# Patient Record
Sex: MALE | Race: WHITE | URBAN - METROPOLITAN AREA
[De-identification: names, ages, dates, MRNs, and addresses within clinical notes are randomized per-mention and may not be internally consistent; named-entity substitution may affect disease eponyms.]

---

## 2021-08-12 ENCOUNTER — OFFICE VISIT (OUTPATIENT)
Dept: GERIATRIC MEDICINE | Age: 86
End: 2021-08-12
Payer: MEDICARE

## 2021-08-12 DIAGNOSIS — G20 PARKINSON DISEASE (HCC): Primary | ICD-10-CM

## 2021-08-12 DIAGNOSIS — R53.1 WEAKNESS: ICD-10-CM

## 2021-08-12 DIAGNOSIS — E11.9 DIABETES MELLITUS WITHOUT COMPLICATION (HCC): ICD-10-CM

## 2021-08-12 DIAGNOSIS — E03.9 HYPOTHYROIDISM, UNSPECIFIED TYPE: ICD-10-CM

## 2021-08-12 DIAGNOSIS — I10 ESSENTIAL HYPERTENSION: ICD-10-CM

## 2021-08-12 RX ORDER — MEMANTINE HYDROCHLORIDE 5 MG/1
5 TABLET ORAL 2 TIMES DAILY
COMMUNITY
End: 2021-08-20 | Stop reason: SDUPTHER

## 2021-08-12 RX ORDER — PRAVASTATIN SODIUM 10 MG
10 TABLET ORAL DAILY
COMMUNITY
End: 2021-08-20 | Stop reason: SDUPTHER

## 2021-08-12 RX ORDER — LEVOTHYROXINE SODIUM 0.07 MG/1
75 TABLET ORAL DAILY
COMMUNITY
End: 2021-08-20 | Stop reason: SDUPTHER

## 2021-08-12 RX ORDER — MULTIVITAMIN
1 TABLET ORAL DAILY
COMMUNITY
End: 2021-08-20 | Stop reason: SDUPTHER

## 2021-08-12 RX ORDER — INSULIN GLARGINE 100 [IU]/ML
15 INJECTION, SOLUTION SUBCUTANEOUS NIGHTLY
COMMUNITY
End: 2021-08-20 | Stop reason: SDUPTHER

## 2021-08-12 RX ORDER — DOCUSATE SODIUM 100 MG/1
100 CAPSULE, LIQUID FILLED ORAL 2 TIMES DAILY
COMMUNITY
End: 2021-09-16 | Stop reason: SDUPTHER

## 2021-08-12 RX ORDER — PRAMIPEXOLE DIHYDROCHLORIDE 0.12 MG/1
0.12 TABLET ORAL DAILY
COMMUNITY
End: 2021-08-20 | Stop reason: SDUPTHER

## 2021-08-12 RX ORDER — POLYETHYLENE GLYCOL 3350 17 G/17G
17 POWDER, FOR SOLUTION ORAL DAILY PRN
COMMUNITY
End: 2021-08-20 | Stop reason: SDUPTHER

## 2021-08-12 RX ORDER — CITALOPRAM 10 MG/1
5 TABLET ORAL DAILY
COMMUNITY
End: 2021-08-20 | Stop reason: SDUPTHER

## 2021-08-20 DIAGNOSIS — K59.00 CONSTIPATION, UNSPECIFIED CONSTIPATION TYPE: ICD-10-CM

## 2021-08-20 DIAGNOSIS — E03.9 HYPOTHYROIDISM, UNSPECIFIED TYPE: ICD-10-CM

## 2021-08-20 DIAGNOSIS — Z79.4 TYPE 2 DIABETES MELLITUS WITHOUT COMPLICATION, WITH LONG-TERM CURRENT USE OF INSULIN (HCC): ICD-10-CM

## 2021-08-20 DIAGNOSIS — F03.90 DEMENTIA WITHOUT BEHAVIORAL DISTURBANCE, UNSPECIFIED DEMENTIA TYPE: ICD-10-CM

## 2021-08-20 DIAGNOSIS — F32.A DEPRESSION, UNSPECIFIED DEPRESSION TYPE: Primary | ICD-10-CM

## 2021-08-20 DIAGNOSIS — G20 PARKINSON'S DISEASE (HCC): ICD-10-CM

## 2021-08-20 DIAGNOSIS — E78.5 HYPERLIPIDEMIA, UNSPECIFIED HYPERLIPIDEMIA TYPE: ICD-10-CM

## 2021-08-20 DIAGNOSIS — Z00.00 HEALTHCARE MAINTENANCE: ICD-10-CM

## 2021-08-20 DIAGNOSIS — E11.9 TYPE 2 DIABETES MELLITUS WITHOUT COMPLICATION, WITH LONG-TERM CURRENT USE OF INSULIN (HCC): ICD-10-CM

## 2021-08-20 RX ORDER — CITALOPRAM 10 MG/1
5 TABLET ORAL DAILY
Qty: 30 TABLET | Refills: 3 | Status: SHIPPED | OUTPATIENT
Start: 2021-08-20 | End: 2021-09-13

## 2021-08-20 RX ORDER — LEVOTHYROXINE SODIUM 0.07 MG/1
75 TABLET ORAL DAILY
Qty: 30 TABLET | Refills: 3 | Status: SHIPPED | OUTPATIENT
Start: 2021-08-20 | End: 2021-12-15

## 2021-08-20 RX ORDER — BLOOD-GLUCOSE METER
1 KIT MISCELLANEOUS DAILY
Qty: 1 KIT | Refills: 0 | Status: SHIPPED | OUTPATIENT
Start: 2021-08-20

## 2021-08-20 RX ORDER — PRAVASTATIN SODIUM 10 MG
10 TABLET ORAL DAILY
Qty: 30 TABLET | Refills: 3 | Status: SHIPPED | OUTPATIENT
Start: 2021-08-20 | End: 2021-09-14

## 2021-08-20 RX ORDER — LANCETS 30 GAUGE
1 EACH MISCELLANEOUS 4 TIMES DAILY
Qty: 200 EACH | Refills: 3 | Status: SHIPPED | OUTPATIENT
Start: 2021-08-20

## 2021-08-20 RX ORDER — MULTIVITAMIN
1 TABLET ORAL DAILY
Qty: 30 TABLET | Refills: 3 | Status: SHIPPED | OUTPATIENT
Start: 2021-08-20 | End: 2021-12-10

## 2021-08-20 RX ORDER — PRAMIPEXOLE DIHYDROCHLORIDE 0.12 MG/1
0.12 TABLET ORAL DAILY
Qty: 30 TABLET | Refills: 3 | Status: SHIPPED | OUTPATIENT
Start: 2021-08-20 | End: 2021-09-15

## 2021-08-20 RX ORDER — CHLORAL HYDRATE 500 MG
1 CAPSULE ORAL DAILY
Qty: 30 CAPSULE | Refills: 3 | Status: SHIPPED | OUTPATIENT
Start: 2021-08-20 | End: 2021-09-14

## 2021-08-20 RX ORDER — DOCUSATE SODIUM 100 MG/1
100 CAPSULE, LIQUID FILLED ORAL 2 TIMES DAILY PRN
Qty: 60 CAPSULE | Refills: 0 | Status: SHIPPED | OUTPATIENT
Start: 2021-08-20 | End: 2021-09-15

## 2021-08-20 RX ORDER — INSULIN LISPRO 100 [IU]/ML
INJECTION, SOLUTION INTRAVENOUS; SUBCUTANEOUS
Qty: 5 CARTRIDGE | Refills: 3 | Status: SHIPPED | OUTPATIENT
Start: 2021-08-20 | End: 2022-05-17 | Stop reason: SDUPTHER

## 2021-08-20 RX ORDER — ACETAMINOPHEN 325 MG/1
650 TABLET ORAL EVERY 4 HOURS PRN
Qty: 120 TABLET | Refills: 3 | Status: SHIPPED | OUTPATIENT
Start: 2021-08-20

## 2021-08-20 RX ORDER — INSULIN GLARGINE 100 [IU]/ML
15 INJECTION, SOLUTION SUBCUTANEOUS NIGHTLY
Qty: 450 UNITS | Refills: 3 | Status: SHIPPED | OUTPATIENT
Start: 2021-08-20 | End: 2021-09-19

## 2021-08-20 RX ORDER — MEMANTINE HYDROCHLORIDE 5 MG/1
5 TABLET ORAL 2 TIMES DAILY
Qty: 60 TABLET | Refills: 3 | Status: SHIPPED | OUTPATIENT
Start: 2021-08-20 | End: 2021-09-14

## 2021-08-20 RX ORDER — LANCETS 30 GAUGE
EACH MISCELLANEOUS
Qty: 200 EACH | Refills: 3 | Status: SHIPPED | OUTPATIENT
Start: 2021-08-20

## 2021-08-20 RX ORDER — POLYETHYLENE GLYCOL 3350 17 G/17G
17 POWDER, FOR SOLUTION ORAL EVERY 8 HOURS PRN
Qty: 527 G | Refills: 3 | Status: SHIPPED | OUTPATIENT
Start: 2021-08-20 | End: 2021-09-19

## 2021-09-12 NOTE — PROGRESS NOTES
PATIENT: Gloria Gamble : 1935 DOS: [id number]  [facility] serving [address] [facility] and [facility] with his wife present. Patient with a known history of Parkinson's, diabetes, dysphagia,  ?weakness and unsteadiness with near falls, degenerative joint disease, hyperglycemia,  hypothyroidism, heart failure chronic systolic, hypothyroidism, hyperlipidemia, status post  pacemaker placement. Family history included Parkinson's, diabetes, coronary artery disease. This was an 81-year-old gentleman seen in his room, pleasant, interactive. Patient has had falls  in the past. Has had prior CABG. Patient had been transitioned to assisted living and  subsequently to this facility, the patient has had prior fall. He was found to have a rib fracture,  had evidence of rhabdomyolysis. Patient had been hospitalized, subsequently stabilized, and  transferred here for possible long-term placement. Patient seen with his wife present who is his  primary caregiver. Patient does have a known history of heart failure with ejection fraction last  checked at 39%. Patient is clinically stable, doing well at this time. Patient's blood sugars have  been intermittently elevated and largely diet controlled. Patient and his wife have a good  understanding on how to manage his blood sugars with coverage insulin. His medications on arrival included Synthroid 75 mcg daily, vitamin D3, pravastatin 10 mg  daily, Namenda 5 mg twice daily, Colace 100 mg daily, fish oil capsule, Celexa 5 mg daily,  Lantus 15 units subcu daily, Humalog insulin with meals. REVIEW OF SYSTEMS: Patient denied chest pain, palpitations, nausea, vomiting, emesis,  fevers, chills. No changes in his bowel or bladder habits. OBJECTIVE: Vital signs are stable. Respirations 14, pulse 68, blood pressure 150/78. He is  alert and oriented x2 to 3. A little unclear on date. Normocephalic. He does have mask-like  facies.  Pupils are small, but they are Transportation (Medical):  Lack of Transportation (Non-Medical):    Physical Activity:     Days of Exercise per Week:     Minutes of Exercise per Session:    Stress:     Feeling of Stress :    Social Connections:     Frequency of Communication with Friends and Family:     Frequency of Social Gatherings with Friends and Family:     Attends [employer] Services:     Active Member of Clubs or Organizations:     Attends Club or Organization Meetings:     Marital Status:    Intimate Partner Violence:     Fear of Current or Ex-Partner:     Emotionally Abused:     Physically Abused:     Sexually Abused:          No results found for: LABA1C  No results found for: EAG    No results found for: NA, K, CL, CO2, BUN, CREATININE, GLUCOSE, CALCIUM     No results found for: CHOL  No results found for: TRIG  No results found for: HDL  No results found for: LDLCHOLESTEROL, LDLCALC  No results found for: LABVLDL, VLDL  No results found for: CHOLHDLRATIO    No results found for: TSHFT4, TSH    No results found for: WBC, HGB, HCT, MCV, PLT    Please note orders entered on site at facility after discussion with appropriate facility nursing/therapy/ / nutritional staff. Current longstanding medical problems and acute medical issues addressed with staff. Available data and data elements in on site paper chart reviewed and analyzed. Current external consultant notes reviewed in on site chart. Ordered laboratory testing and imaging will be reviewed when available.

## 2021-09-14 DIAGNOSIS — G20 PARKINSON'S DISEASE (HCC): ICD-10-CM

## 2021-09-16 RX ORDER — PRAMIPEXOLE DIHYDROCHLORIDE 0.12 MG/1
TABLET ORAL
Qty: 90 TABLET | Refills: 0 | Status: SHIPPED | OUTPATIENT
Start: 2021-09-16 | End: 2021-12-15

## 2021-09-16 RX ORDER — DOCUSATE SODIUM 100 MG/1
CAPSULE, LIQUID FILLED ORAL
Qty: 180 CAPSULE | Refills: 0 | Status: SHIPPED | OUTPATIENT
Start: 2021-09-16

## 2021-09-20 DIAGNOSIS — Z79.4 TYPE 2 DIABETES MELLITUS WITHOUT COMPLICATION, WITH LONG-TERM CURRENT USE OF INSULIN (HCC): Primary | ICD-10-CM

## 2021-09-20 DIAGNOSIS — E11.9 TYPE 2 DIABETES MELLITUS WITHOUT COMPLICATION, WITH LONG-TERM CURRENT USE OF INSULIN (HCC): Primary | ICD-10-CM

## 2021-09-20 DIAGNOSIS — E11.65 UNCONTROLLED TYPE 2 DIABETES MELLITUS WITH HYPERGLYCEMIA (HCC): Primary | ICD-10-CM

## 2021-09-20 RX ORDER — BLOOD-GLUCOSE METER
1 KIT MISCELLANEOUS 4 TIMES DAILY
Qty: 150 EACH | Refills: 3 | Status: CANCELLED | OUTPATIENT
Start: 2021-09-20

## 2021-09-21 RX ORDER — BLOOD-GLUCOSE METER
1 KIT MISCELLANEOUS DAILY
Qty: 100 EACH | Refills: 3 | Status: SHIPPED | OUTPATIENT
Start: 2021-09-21 | End: 2022-06-27

## 2021-09-28 PROBLEM — R53.1 WEAKNESS: Status: ACTIVE | Noted: 2021-09-28

## 2021-09-28 PROBLEM — G20.A1 PARKINSON DISEASE: Status: ACTIVE | Noted: 2021-09-28

## 2021-09-28 PROBLEM — E11.9 DIABETES MELLITUS WITHOUT COMPLICATION (HCC): Status: ACTIVE | Noted: 2021-09-28

## 2021-09-28 PROBLEM — I10 ESSENTIAL HYPERTENSION: Status: ACTIVE | Noted: 2021-09-28

## 2021-09-28 PROBLEM — G20 PARKINSON DISEASE (HCC): Status: ACTIVE | Noted: 2021-09-28

## 2021-09-28 PROBLEM — E03.9 HYPOTHYROIDISM: Status: ACTIVE | Noted: 2021-09-28

## 2021-10-06 ENCOUNTER — OFFICE VISIT (OUTPATIENT)
Dept: GERIATRIC MEDICINE | Age: 86
End: 2021-10-06
Payer: MEDICARE

## 2021-10-06 DIAGNOSIS — E11.9 DIABETES MELLITUS WITHOUT COMPLICATION (HCC): Primary | ICD-10-CM

## 2021-10-06 DIAGNOSIS — E03.9 HYPOTHYROIDISM, UNSPECIFIED TYPE: ICD-10-CM

## 2021-12-05 NOTE — PROGRESS NOTES
[facility]  Chief Complaint   Patient presents with    Hypothyroidism    Diabetes       Patient being seen today for acute visit for diabetes mellitus and hypothyroidism. Resident recently requested to switch over to our service so he does not need to find transportation to an outside provider. Resident wants to ensure that he is taking the correct dosage of his insulin. He is managing himself at home with some supervision from his wife. He is home bound in an independent living facility. Otherwise, no concerns. SUBJECTIVE:  Resident states she is doing well. He just wants to ensure he is taking the correct dose of insulin. Otherwise, no concerns. FAMILY/SOCIAL HISTORY:  Refer to H and P. No past medical history on file. MEDICATIONS AND ALLERGIES:  Reviewed in Epic and in chart at facility. ALLERGIES:aspirin, levofloxacin, sulfa antibiotics. MEDICATIONS: Mirapex 0.125 mg 1 tab p.o. q.d.; docusate sodium 100-mg capsule p.o. b.i.d., fish oil 1000-mg capsules p.o. q.d.; Celexa 10-mg tab take 0.5 tab p.o. q.d.; memantine 5-mg tab 1 tab p.o. b.i.d.; vitamin D 25 mcg p.o. q.d.; Pravachol 10-mg tab p.o. q.d.; acetaminophen 325-mg tab 2 tabs p.o. q.4 p.r.n. pain; Lantus 18 units at h.s.; Humalog 6 units with breakfast and lunch, 14 units with dinner; multivitamin 1 tab p.o. q.d.; Synthroid 75-mcg tab 1 tab p.o. q.d. REVIEW OF SYSTEMS:  Resident denies any headache, dizziness, blurred vision. He denies any fever, chills, sore throat. He denies any rashes, bruises, open areas. He denies any chest pain, chest discomfort, or palpitations. He denies any cough, shortness of breath. He denies any heartburn, nausea, vomiting, abdominal pain. He denies any urinary urgency, frequency, or dysuria. He denies any constipation or diarrhea. He states he is eating well and sleeping well, and has no pain. PE:  Resident's heart rate 76, respirations 19.   Constitutional: Resident is alert, elderly male, appears somewhat confused at his baseline, in no apparent distress. Integ:  Pink, warm, dry. No visible rashes, bruises or open areas. HEENT:  Normocephalic, atraumatic in appearance. External ears within normal limits. He is hard of hearing. Conjunctivae pink. Sclerae nonicteric. Nasal mucosa pink. No nasal drainage noted. Mucous membranes pink, moist.  No oropharyngeal exudate. Neck:  Supple. No cervical or clavicular lymphadenopathy. No masses noted. Cardiovascular:  Heart rate regular rate and rhythm. No murmurs, gallops, or rubs noted. Respiratory:  Lung sounds clear through all fields. Respirations even and nonlabored. No use of accessory muscles with breathing. Abdomen:  Soft, nontender, nondistended. Normoactive bowel sounds. No masses noted. Musculoskeletal:  No muscle tenderness. No joint discomfort. PV:  Peripheral pulses present. No edema noted. Neurological:  Resident is alert, somewhat confused at his baseline. Ambulates with stable gait. Psych:  Mood stable. Affect pleasant. Speech normal rate and tone. A AND P:  1. Diabetes mellitus per Epic. Resident is on 18 units of Lantus at h.s.; Humalog 6 units breakfast and lunch, 14 units with dinner. I have advised him to continue to track his readings and if they appear to be trending upward and higher that they should contact nursing staff and I will come back. It appears currently that he is taking the correct dosing. 2.   Hypothyroidism. We will do a TSH and we will continue his Synthroid as ordered. I have reviewed this resident's medication and treatment plan, as well as most recent lab work. No further changes will be made at this time. Return if symptoms worsen or fail to improve. Please note this report is partially produced by using speech recognition hardware.   It may contain errors related to the system, including grammar, punctuation and spelling as well as words and phrases that may seem inaccurate. For any questions or concerns feel free to contact me for clarification           Electronically Signed By: Luc Pruitt. Bert Gentile CNP on 12/05/2021 12:52:25  ______________________________  Luc Pruitt.  Bert Gentile CNP  QP/[id number]  D: 12/02/2021 12:06:13  T: 12/02/2021 12:36:08    cc: - [facility]

## 2021-12-09 DIAGNOSIS — Z00.00 HEALTHCARE MAINTENANCE: ICD-10-CM

## 2021-12-10 RX ORDER — MULTIVITAMIN WITH FOLIC ACID 400 MCG
TABLET ORAL
Qty: 30 TABLET | Refills: 3 | Status: SHIPPED | OUTPATIENT
Start: 2021-12-10 | End: 2022-05-17

## 2021-12-14 DIAGNOSIS — G20 PARKINSON'S DISEASE (HCC): ICD-10-CM

## 2021-12-14 DIAGNOSIS — E03.9 HYPOTHYROIDISM, UNSPECIFIED TYPE: ICD-10-CM

## 2021-12-15 RX ORDER — PRAMIPEXOLE DIHYDROCHLORIDE 0.12 MG/1
TABLET ORAL
Qty: 90 TABLET | Refills: 0 | Status: SHIPPED | OUTPATIENT
Start: 2021-12-15 | End: 2022-05-17

## 2021-12-15 RX ORDER — LEVOTHYROXINE SODIUM 0.07 MG/1
TABLET ORAL
Qty: 90 TABLET | Refills: 1 | Status: SHIPPED | OUTPATIENT
Start: 2021-12-15 | End: 2022-05-17

## 2021-12-17 LAB — TSH SERPL DL<=0.05 MIU/L-ACNC: 0.38 UIU/ML (ref 0.44–3.86)

## 2022-01-14 LAB — TSH SERPL DL<=0.05 MIU/L-ACNC: 0.51 UIU/ML (ref 0.44–3.86)

## 2022-02-02 DIAGNOSIS — F32.9 MAJOR DEPRESSIVE DISORDER, REMISSION STATUS UNSPECIFIED, UNSPECIFIED WHETHER RECURRENT: Primary | ICD-10-CM

## 2022-02-02 DIAGNOSIS — F32.A DEPRESSION, UNSPECIFIED DEPRESSION TYPE: ICD-10-CM

## 2022-02-02 LAB
BACTERIA: NEGATIVE /HPF
BILIRUBIN URINE: NEGATIVE
BLOOD, URINE: ABNORMAL
CLARITY: CLEAR
COLOR: ABNORMAL
EPITHELIAL CELLS, UA: ABNORMAL /HPF (ref 0–5)
GLUCOSE URINE: 500 MG/DL
HYALINE CASTS: ABNORMAL /HPF (ref 0–5)
KETONES, URINE: NEGATIVE MG/DL
LEUKOCYTE ESTERASE, URINE: NEGATIVE
NITRITE, URINE: NEGATIVE
PH UA: 6.5 (ref 5–9)
PROTEIN UA: ABNORMAL MG/DL
RBC UA: >100 /HPF (ref 0–5)
SPECIFIC GRAVITY UA: 1.02 (ref 1–1.03)
UROBILINOGEN, URINE: 0.2 E.U./DL
WBC UA: ABNORMAL /HPF (ref 0–5)

## 2022-02-02 RX ORDER — CITALOPRAM 10 MG/1
10 TABLET ORAL DAILY
Qty: 30 TABLET | Refills: 3 | Status: SHIPPED | OUTPATIENT
Start: 2022-02-02 | End: 2022-03-02

## 2022-02-04 LAB — URINE CULTURE, ROUTINE: NORMAL

## 2022-02-10 DIAGNOSIS — Z00.00 HEALTHCARE MAINTENANCE: ICD-10-CM

## 2022-02-10 RX ORDER — CHLORAL HYDRATE 500 MG
CAPSULE ORAL
Qty: 90 CAPSULE | Refills: 1 | Status: SHIPPED | OUTPATIENT
Start: 2022-02-10 | End: 2022-10-06

## 2022-02-11 LAB
ALBUMIN SERPL-MCNC: 3.8 G/DL (ref 3.5–4.6)
ALP BLD-CCNC: 88 U/L (ref 35–104)
ALT SERPL-CCNC: 29 U/L (ref 0–41)
AMMONIA: 18 UMOL/L (ref 16–60)
ANION GAP SERPL CALCULATED.3IONS-SCNC: 10 MEQ/L (ref 9–15)
AST SERPL-CCNC: 40 U/L (ref 0–40)
BASOPHILS ABSOLUTE: 0 K/UL (ref 0–0.2)
BASOPHILS RELATIVE PERCENT: 0.5 %
BILIRUB SERPL-MCNC: 0.6 MG/DL (ref 0.2–0.7)
BUN BLDV-MCNC: 30 MG/DL (ref 8–23)
CALCIUM SERPL-MCNC: 9.3 MG/DL (ref 8.5–9.9)
CHLORIDE BLD-SCNC: 106 MEQ/L (ref 95–107)
CO2: 24 MEQ/L (ref 20–31)
CREAT SERPL-MCNC: 1.34 MG/DL (ref 0.7–1.2)
EOSINOPHILS ABSOLUTE: 0.1 K/UL (ref 0–0.7)
EOSINOPHILS RELATIVE PERCENT: 2.3 %
GFR AFRICAN AMERICAN: >60
GFR NON-AFRICAN AMERICAN: 50.6
GLOBULIN: 3.3 G/DL (ref 2.3–3.5)
GLUCOSE BLD-MCNC: 96 MG/DL (ref 70–99)
HCT VFR BLD CALC: 40.7 % (ref 42–52)
HEMOGLOBIN: 13.3 G/DL (ref 14–18)
LYMPHOCYTES ABSOLUTE: 1.2 K/UL (ref 1–4.8)
LYMPHOCYTES RELATIVE PERCENT: 19.4 %
MCH RBC QN AUTO: 29.8 PG (ref 27–31.3)
MCHC RBC AUTO-ENTMCNC: 32.8 % (ref 33–37)
MCV RBC AUTO: 91 FL (ref 80–100)
MONOCYTES ABSOLUTE: 0.5 K/UL (ref 0.2–0.8)
MONOCYTES RELATIVE PERCENT: 7.9 %
NEUTROPHILS ABSOLUTE: 4.3 K/UL (ref 1.4–6.5)
NEUTROPHILS RELATIVE PERCENT: 69.9 %
PDW BLD-RTO: 14.4 % (ref 11.5–14.5)
PLATELET # BLD: 136 K/UL (ref 130–400)
POTASSIUM SERPL-SCNC: 5.1 MEQ/L (ref 3.4–4.9)
RBC # BLD: 4.47 M/UL (ref 4.7–6.1)
SODIUM BLD-SCNC: 140 MEQ/L (ref 135–144)
TOTAL PROTEIN: 7.1 G/DL (ref 6.3–8)
TSH SERPL DL<=0.05 MIU/L-ACNC: 0.51 UIU/ML (ref 0.44–3.86)
WBC # BLD: 6.2 K/UL (ref 4.8–10.8)

## 2022-02-12 DIAGNOSIS — F03.90 DEMENTIA WITHOUT BEHAVIORAL DISTURBANCE, UNSPECIFIED DEMENTIA TYPE: ICD-10-CM

## 2022-02-12 DIAGNOSIS — Z00.00 HEALTHCARE MAINTENANCE: ICD-10-CM

## 2022-02-12 DIAGNOSIS — E78.5 HYPERLIPIDEMIA, UNSPECIFIED HYPERLIPIDEMIA TYPE: ICD-10-CM

## 2022-02-14 RX ORDER — MEMANTINE HYDROCHLORIDE 5 MG/1
TABLET ORAL
Qty: 180 TABLET | Refills: 1 | Status: SHIPPED | OUTPATIENT
Start: 2022-02-14 | End: 2022-10-06

## 2022-02-14 RX ORDER — PRAVASTATIN SODIUM 10 MG
TABLET ORAL
Qty: 90 TABLET | Refills: 1 | Status: SHIPPED | OUTPATIENT
Start: 2022-02-14 | End: 2022-09-20

## 2022-02-14 RX ORDER — VITAMIN B COMPLEX
TABLET ORAL
Qty: 90 TABLET | Refills: 1 | Status: SHIPPED | OUTPATIENT
Start: 2022-02-14 | End: 2022-07-25

## 2022-02-16 LAB
ALBUMIN SERPL-MCNC: 3.7 G/DL (ref 3.5–4.6)
ALP BLD-CCNC: 96 U/L (ref 35–104)
ALT SERPL-CCNC: 29 U/L (ref 0–41)
ANION GAP SERPL CALCULATED.3IONS-SCNC: 11 MEQ/L (ref 9–15)
AST SERPL-CCNC: 37 U/L (ref 0–40)
BILIRUB SERPL-MCNC: 0.5 MG/DL (ref 0.2–0.7)
BUN BLDV-MCNC: 20 MG/DL (ref 8–23)
CALCIUM SERPL-MCNC: 9.2 MG/DL (ref 8.5–9.9)
CHLORIDE BLD-SCNC: 104 MEQ/L (ref 95–107)
CO2: 24 MEQ/L (ref 20–31)
CREAT SERPL-MCNC: 1.2 MG/DL (ref 0.7–1.2)
GFR AFRICAN AMERICAN: >60
GFR NON-AFRICAN AMERICAN: 57.5
GLOBULIN: 3.2 G/DL (ref 2.3–3.5)
GLUCOSE BLD-MCNC: 93 MG/DL (ref 70–99)
POTASSIUM SERPL-SCNC: 4.9 MEQ/L (ref 3.4–4.9)
SODIUM BLD-SCNC: 139 MEQ/L (ref 135–144)
TOTAL PROTEIN: 6.9 G/DL (ref 6.3–8)

## 2022-03-02 DIAGNOSIS — F32.9 MAJOR DEPRESSIVE DISORDER, REMISSION STATUS UNSPECIFIED, UNSPECIFIED WHETHER RECURRENT: ICD-10-CM

## 2022-03-03 ENCOUNTER — OFFICE VISIT (OUTPATIENT)
Dept: GERIATRIC MEDICINE | Age: 87
End: 2022-03-03
Payer: MEDICARE

## 2022-03-03 DIAGNOSIS — R21 RASH: Primary | ICD-10-CM

## 2022-03-03 RX ORDER — CITALOPRAM 10 MG/1
TABLET ORAL
Qty: 30 TABLET | Refills: 3 | Status: SHIPPED | OUTPATIENT
Start: 2022-03-03 | End: 2022-06-07

## 2022-03-28 NOTE — PROGRESS NOTES
SUBJECTIVE:  Patient seen in his room today with his family present. Complaining of muscle rash below the left knee. Patient has not recent fevers or chills patient has not been new acute joint pain no evidence acute functional febrile episode or cellulitis. Patient is in terms of anti-inflammatory currently. Clinically stable this time. ROS: Chest palpitations nausea vomiting emesis  The rest of the 14 point ROS negative    PHYSICAL EXAM: VSS per facility record  Normocephalic atraumatic oral mucosa moist chest showed no crackles no wheezing cardiovascular showed a regular abdomen is obese positive bowel sounds extremities +1 edema laterally small irritability left knee no fluctuance no pain or discharge with pressure. No blistering agents at this time    ASSESSMENT & PLAN:   Diagnosis Orders   1. Rash         Monitor clinically we will follow up with family if worsening consider possibility of Keflex if personally beyond the margins that are limited at this time          No past medical history on file. No past surgical history on file.       Current Outpatient Medications on File Prior to Visit   Medication Sig Dispense Refill    citalopram (CELEXA) 10 MG tablet TAKE 1 TABLET BY MOUTH EVERY DAY 30 tablet 3    pravastatin (PRAVACHOL) 10 MG tablet TAKE 1 TABLET BY MOUTH EVERY DAY 90 tablet 1    memantine (NAMENDA) 5 MG tablet TAKE 1 TABLET BY MOUTH 2 TIMES DAILY 180 tablet 1    Vitamin D (CHOLECALCIFEROL) 25 MCG (1000 UT) TABS tablet TAKE 1 TABLET BY MOUTH EVERY DAY 90 tablet 1    Omega-3 Fatty Acids (FISH OIL) 1000 MG CAPS TAKE 1 CAPSULE BY MOUTH EVERY DAY 90 capsule 1    pramipexole (MIRAPEX) 0.125 MG tablet TAKE 1 TABLET BY MOUTH EVERY DAY 90 tablet 0    levothyroxine (SYNTHROID) 75 MCG tablet TAKE 1 TABLET BY MOUTH EVERY DAY 90 tablet 1    Multiple Vitamin (DAILY-KARIN MULTIVITAMIN) TABS TAKE 1 TABLET BY MOUTH EVERY DAY 30 tablet 3    blood glucose test strips (FREESTYLE LITE) strip 1 each by In Vitro route daily Test BG tid before meals, DX :  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 100 each 3    docusate sodium (COLACE) 100 MG capsule TAKE 1 CAPSULE BY MOUTH TWICE A DAY AS NEEDED FOR CONSTIPATION 180 capsule 0    acetaminophen (AMINOFEN) 325 MG tablet Take 2 tablets by mouth every 4 hours as needed for Pain 120 tablet 3    insulin glargine (LANTUS) 100 UNIT/ML injection vial Inject 15 Units into the skin nightly 450 Units 3    insulin lispro (HUMALOG) 100 UNIT/ML injection cartridge Indications: Diabetes Inject 0-14 Units into the skin 3 times daily (before meals) Indications: Diabetes Inject per sliding scale: if 0-149=0; 150-200=3 units; 201-250=6 units; 251-300=9 units; 301-350=12units; 351-400=14 units before meals. Over 400 notify MD/NP 5 Cartridge 3    glucose monitoring (FREESTYLE FREEDOM) kit 1 kit by Does not apply route daily 1 kit 0    Lancets MISC 1 each by Does not apply route 4 times daily 200 each 3    Alcohol Swabs (EASY COMFORT ALCOHOL PADS) PADS For use prior to blood glucose check 200 each 3     No current facility-administered medications on file prior to visit. No family history on file.     Social History     Socioeconomic History    Marital status:      Spouse name: Not on file    Number of children: Not on file    Years of education: Not on file    Highest education level: Not on file   Occupational History    Not on file   Tobacco Use    Smoking status: Not on file    Smokeless tobacco: Not on file   Substance and Sexual Activity    Alcohol use: Not on file    Drug use: Not on file    Sexual activity: Not on file   Other Topics Concern    Not on file   Social History Narrative    Not on file     Social Determinants of Health     Financial Resource Strain:     Difficulty of Paying Living Expenses: Not on file   Food Insecurity:     Worried About Running Out of Food in the Last Year: Not on file    Alejandro of Food in the Last Year: Not on file Transportation Needs:     Lack of Transportation (Medical): Not on file    Lack of Transportation (Non-Medical): Not on file   Physical Activity:     Days of Exercise per Week: Not on file    Minutes of Exercise per Session: Not on file   Stress:     Feeling of Stress : Not on file   Social Connections:     Frequency of Communication with Friends and Family: Not on file    Frequency of Social Gatherings with Friends and Family: Not on file    Attends Sabianism Services: Not on file    Active Member of [address] [place] [address] [employer] or Organizations: Not on file    Attends Club or Organization Meetings: Not on file    Marital Status: Not on file   Intimate Partner Violence:     Fear of Current or Ex-Partner: Not on file    Emotionally Abused: Not on file    Physically Abused: Not on file    Sexually Abused: Not on file   Housing Stability:     Unable to Pay for Housing in the Last Year: Not on file    Number of [place] in the Last Year: Not on file    Unstable Housing in the Last Year: Not on file         No results found for: LABA1C  No results found for: EAG    Lab Results   Component Value Date     02/16/2022    K 4.9 02/16/2022     02/16/2022    CO2 24 02/16/2022    BUN 20 02/16/2022    CREATININE 1.20 02/16/2022    GLUCOSE 93 02/16/2022    CALCIUM 9.2 02/16/2022        No results found for: CHOL  No results found for: TRIG  No results found for: HDL  No results found for: LDLCHOLESTEROL, LDLCALC  No results found for: LABVLDL, VLDL  No results found for: [facility]    Lab Results   Component Value Date    TSH 0.508 02/11/2022       Lab Results   Component Value Date    WBC 6.2 02/11/2022    HGB 13.3 (L) 02/11/2022    HCT 40.7 (L) 02/11/2022    MCV 91.0 02/11/2022     02/11/2022       Please note orders entered on site at facility after discussion with appropriate facility nursing/therapy/ / nutritional staff.  Current longstanding medical problems and acute medical issues addressed with staff. Available data and data elements in on site paper chart reviewed and analyzed. Current external consultant notes reviewed in on site chart.  Ordered laboratory testing and imaging will be reviewed when available. '

## 2022-05-13 DIAGNOSIS — E03.9 HYPOTHYROIDISM, UNSPECIFIED TYPE: ICD-10-CM

## 2022-05-13 DIAGNOSIS — Z79.4 TYPE 2 DIABETES MELLITUS WITHOUT COMPLICATION, WITH LONG-TERM CURRENT USE OF INSULIN (HCC): ICD-10-CM

## 2022-05-13 DIAGNOSIS — Z00.00 HEALTHCARE MAINTENANCE: ICD-10-CM

## 2022-05-13 DIAGNOSIS — E11.9 TYPE 2 DIABETES MELLITUS WITHOUT COMPLICATION, WITH LONG-TERM CURRENT USE OF INSULIN (HCC): ICD-10-CM

## 2022-05-13 DIAGNOSIS — G20 PARKINSON'S DISEASE (HCC): ICD-10-CM

## 2022-05-13 RX ORDER — INSULIN LISPRO 100 [IU]/ML
INJECTION, SOLUTION INTRAVENOUS; SUBCUTANEOUS
Refills: 3 | Status: CANCELLED | OUTPATIENT
Start: 2022-05-13

## 2022-05-17 RX ORDER — LEVOTHYROXINE SODIUM 0.07 MG/1
TABLET ORAL
Qty: 90 TABLET | Refills: 1 | Status: SHIPPED | OUTPATIENT
Start: 2022-05-17

## 2022-05-17 RX ORDER — INSULIN LISPRO 100 [IU]/ML
INJECTION, SOLUTION INTRAVENOUS; SUBCUTANEOUS
Qty: 15 EACH | Refills: 3 | Status: SHIPPED | OUTPATIENT
Start: 2022-05-17

## 2022-05-17 RX ORDER — PRAMIPEXOLE DIHYDROCHLORIDE 0.12 MG/1
TABLET ORAL
Qty: 90 TABLET | Refills: 0 | Status: SHIPPED | OUTPATIENT
Start: 2022-05-17 | End: 2022-08-09

## 2022-05-17 RX ORDER — PEN NEEDLE, DIABETIC 31 GX5/16"
NEEDLE, DISPOSABLE MISCELLANEOUS
Qty: 300 EACH | Refills: 2 | Status: SHIPPED | OUTPATIENT
Start: 2022-05-17

## 2022-05-17 RX ORDER — MULTIVITAMIN WITH FOLIC ACID 400 MCG
TABLET ORAL
Qty: 30 TABLET | Refills: 3 | Status: SHIPPED | OUTPATIENT
Start: 2022-05-17 | End: 2022-09-07

## 2022-06-04 DIAGNOSIS — F32.9 MAJOR DEPRESSIVE DISORDER, REMISSION STATUS UNSPECIFIED, UNSPECIFIED WHETHER RECURRENT: ICD-10-CM

## 2022-06-07 RX ORDER — CITALOPRAM 10 MG/1
TABLET ORAL
Qty: 90 TABLET | Refills: 1 | Status: SHIPPED | OUTPATIENT
Start: 2022-06-07

## 2022-06-27 DIAGNOSIS — E11.9 TYPE 2 DIABETES MELLITUS WITHOUT COMPLICATION, WITH LONG-TERM CURRENT USE OF INSULIN (HCC): ICD-10-CM

## 2022-06-27 DIAGNOSIS — Z79.4 TYPE 2 DIABETES MELLITUS WITHOUT COMPLICATION, WITH LONG-TERM CURRENT USE OF INSULIN (HCC): ICD-10-CM

## 2022-06-28 RX ORDER — BLOOD-GLUCOSE METER
KIT MISCELLANEOUS
Qty: 100 STRIP | Refills: 3 | Status: SHIPPED | OUTPATIENT
Start: 2022-06-28

## 2022-07-25 DIAGNOSIS — Z00.00 HEALTHCARE MAINTENANCE: ICD-10-CM

## 2022-07-25 DIAGNOSIS — E55.9 VITAMIN D DEFICIENCY: Primary | ICD-10-CM

## 2022-07-25 RX ORDER — PSYLLIUM HUSK 0.4 G
CAPSULE ORAL
Qty: 90 TABLET | Refills: 1 | Status: SHIPPED | OUTPATIENT
Start: 2022-07-25 | End: 2022-10-06 | Stop reason: SDUPTHER

## 2022-08-08 DIAGNOSIS — G20 PARKINSON'S DISEASE (HCC): ICD-10-CM

## 2022-08-09 RX ORDER — PRAMIPEXOLE DIHYDROCHLORIDE 0.12 MG/1
0.12 TABLET ORAL NIGHTLY
Qty: 90 TABLET | Refills: 3 | Status: SHIPPED | OUTPATIENT
Start: 2022-08-09

## 2022-09-02 DIAGNOSIS — E78.5 HYPERLIPIDEMIA, UNSPECIFIED HYPERLIPIDEMIA TYPE: ICD-10-CM

## 2022-09-06 DIAGNOSIS — Z00.00 HEALTHCARE MAINTENANCE: ICD-10-CM

## 2022-09-07 RX ORDER — MULTIVITAMIN WITH FOLIC ACID 400 MCG
TABLET ORAL
Qty: 30 TABLET | Refills: 3 | Status: SHIPPED | OUTPATIENT
Start: 2022-09-07

## 2022-09-14 LAB
CHOLESTEROL, TOTAL: 152 MG/DL (ref 0–199)
HDLC SERPL-MCNC: 58 MG/DL (ref 40–59)
LDL CHOLESTEROL CALCULATED: 77 MG/DL (ref 0–129)
TRIGL SERPL-MCNC: 85 MG/DL (ref 0–150)

## 2022-09-15 ENCOUNTER — OFFICE VISIT (OUTPATIENT)
Dept: GERIATRIC MEDICINE | Age: 87
End: 2022-09-15
Payer: MEDICARE

## 2022-09-15 DIAGNOSIS — E11.9 DIABETES MELLITUS WITHOUT COMPLICATION (HCC): ICD-10-CM

## 2022-09-15 DIAGNOSIS — I10 ESSENTIAL HYPERTENSION: ICD-10-CM

## 2022-09-15 DIAGNOSIS — G20 PARKINSON DISEASE (HCC): Primary | ICD-10-CM

## 2022-09-15 PROCEDURE — 1123F ACP DISCUSS/DSCN MKR DOCD: CPT | Performed by: INTERNAL MEDICINE

## 2022-09-20 RX ORDER — PRAVASTATIN SODIUM 10 MG
TABLET ORAL
Qty: 90 TABLET | Refills: 1 | Status: SHIPPED | OUTPATIENT
Start: 2022-09-20

## 2022-09-21 ENCOUNTER — OFFICE VISIT (OUTPATIENT)
Dept: GERIATRIC MEDICINE | Age: 87
End: 2022-09-21
Payer: MEDICARE

## 2022-09-21 DIAGNOSIS — G89.29 CHRONIC RIGHT SHOULDER PAIN: Primary | ICD-10-CM

## 2022-09-21 DIAGNOSIS — M25.511 CHRONIC RIGHT SHOULDER PAIN: Primary | ICD-10-CM

## 2022-09-21 PROCEDURE — 1123F ACP DISCUSS/DSCN MKR DOCD: CPT | Performed by: NURSE PRACTITIONER

## 2022-10-01 NOTE — PROGRESS NOTES
SUBJECTIVE:  This 81year-old gentleman seen with nursing present request of family for evaluation. Patient is cognitively tactile up and ambulatory patient is able to function at a high level and this assisted living patient has not suffered new falls Parkinson's been stable blood sugars and blood pressures. Oral intake is good weight has been unchanged      ROS: Denies chest palpitation nausea vomiting  The rest of the 14 point ROS negative    PHYSICAL EXAM: VSS per facility record  Smoker masklike facies pupils are reactive oral mucosa moist no dysarthria chest showed no crackles no wheezing cardiovascular showed a regular rate abdomen soft nontender EXTR showed + palpable    ASSESSMENT & PLAN:   Diagnosis Orders   1. Parkinson disease ([facility] [address]        2. Diabetes mellitus without complication ([facility] [address]        3. Essential hypertension          Continue with Sinemet tolerating well is on lispro insulin repeat A1c is pending. Continue intermittent regimen repeat BMP CBC in 6 months            No past medical history on file. No past surgical history on file.       Current Outpatient Medications on File Prior to Visit   Medication Sig Dispense Refill    pravastatin (PRAVACHOL) 10 MG tablet TAKE 1 TABLET BY MOUTH EVERY DAY 90 tablet 1    Multiple Vitamin (DAILY-KARIN MULTIVITAMIN) TABS TAKE 1 TABLET BY MOUTH EVERY DAY 30 tablet 3    pramipexole (MIRAPEX) 0.125 MG tablet Take 1 tablet by mouth nightly 90 tablet 3    VITAMIN D-1000 MAX ST 25 MCG (1000 UT) TABS tablet TAKE 1 TABLET BY MOUTH EVERY DAY 90 tablet 1    FREESTYLE LITE strip USE TO TEST THREE TIMES DAILY BEFORE MEALS 100 strip 3    citalopram (CELEXA) 10 MG tablet TAKE 1 TABLET BY MOUTH EVERY DAY 90 tablet 1    levothyroxine (SYNTHROID) 75 MCG tablet TAKE 1 TABLET BY MOUTH EVERY DAY 90 tablet 1    B-D ULTRAFINE III SHORT PEN 31G X 8 MM MISC USE WITH INSULIN DAILY 300 each 2    insulin lispro (HUMALOG) 100 UNIT/ML injection cartridge Indications: Diabetes Inject 0-14 Units into the skin 3 times daily (before meals) Indications: Diabetes Inject per sliding scale: if 0-149=0; 150-200=3 units; 201-250=6 units; 251-300=9 units; 301-350=12units; 351-400=14 units before meals. Over 400 notify MD/NP 15 each 3    memantine (NAMENDA) 5 MG tablet TAKE 1 TABLET BY MOUTH 2 TIMES DAILY 180 tablet 1    Omega-3 Fatty Acids (FISH OIL) 1000 MG CAPS TAKE 1 CAPSULE BY MOUTH EVERY DAY 90 capsule 1    docusate sodium (COLACE) 100 MG capsule TAKE 1 CAPSULE BY MOUTH TWICE A DAY AS NEEDED FOR CONSTIPATION 180 capsule 0    acetaminophen (AMINOFEN) 325 MG tablet Take 2 tablets by mouth every 4 hours as needed for Pain 120 tablet 3    insulin glargine (LANTUS) 100 UNIT/ML injection vial Inject 15 Units into the skin nightly 450 Units 3    glucose monitoring (FREESTYLE FREEDOM) kit 1 kit by Does not apply route daily 1 kit 0    Lancets MISC 1 each by Does not apply route 4 times daily 200 each 3    Alcohol Swabs (EASY COMFORT ALCOHOL PADS) PADS For use prior to blood glucose check 200 each 3     No current facility-administered medications on file prior to visit. No family history on file.     Social History     Socioeconomic History    Marital status:      Spouse name: Not on file    Number of children: Not on file    Years of education: Not on file    Highest education level: Not on file   Occupational History    Not on file   Tobacco Use    Smoking status: Not on file    Smokeless tobacco: Not on file   Substance and Sexual Activity    Alcohol use: Not on file    Drug use: Not on file    Sexual activity: Not on file   Other Topics Concern    Not on file   Social History Narrative    Not on file     Social Determinants of Health     Financial Resource Strain: Not on file   Food Insecurity: Not on file   Transportation Needs: Not on file   Physical Activity: Not on file   Stress: Not on file   Social Connections: Not on file   Intimate Partner Violence: Not on file   Housing Stability: Not on file         No results found for: LABA1C  No results found for: EAG    Lab Results   Component Value Date/Time     02/16/2022 04:59 AM    K 4.9 02/16/2022 04:59 AM     02/16/2022 04:59 AM    CO2 24 02/16/2022 04:59 AM    BUN 20 02/16/2022 04:59 AM    CREATININE 1.20 02/16/2022 04:59 AM    GLUCOSE 93 02/16/2022 04:59 AM    CALCIUM 9.2 02/16/2022 04:59 AM        Lab Results   Component Value Date    CHOL 152 09/14/2022     Lab Results   Component Value Date    TRIG 85 09/14/2022     Lab Results   Component Value Date    HDL 58 09/14/2022     Lab Results   Component Value Date    LDLCALC 77 09/14/2022     No results found for: LABVLDL, VLDL  No results found for: [facility]    Lab Results   Component Value Date    TSH 0.508 02/11/2022       Lab Results   Component Value Date    WBC 6.2 02/11/2022    HGB 13.3 (L) 02/11/2022    HCT 40.7 (L) 02/11/2022    MCV 91.0 02/11/2022     02/11/2022       Please note orders entered on site at facility after discussion with appropriate facility nursing/therapy/ / nutritional staff. Current longstanding medical problems and acute medical issues addressed with staff. Available data and data elements in on site paper chart reviewed and analyzed. Current external consultant notes reviewed in on site chart. Ordered laboratory testing and imaging will be reviewed when available.

## 2022-10-01 NOTE — PROGRESS NOTES
[facility]  9/21/2022    Ples Dancer  is a 81 y.o. in the NF being seen for a acute visit for   Chief Complaint   Patient presents with    Shoulder Pain       HPI patient being seen today for acute visit for right shoulder pain. Patient reports that his right shoulder is bothering him. Wife reports that she periodically gets some Tylenol but she does not want him taking it every day. Patient does report he uses over-the-counter muscle/arthritic pain rub periodically to help with the pain. They are eating and drinking at their baseline per nursing. They have had no recent falls with injuries. Have had no recent choking or dysphagia issues. NO agitation or unusual mental behavioral issues. No past medical history on file. No past surgical history on file. No family history on file. Social History     Socioeconomic History    Marital status:      Spouse name: Not on file    Number of children: Not on file    Years of education: Not on file    Highest education level: Not on file   Occupational History    Not on file   Tobacco Use    Smoking status: Not on file    Smokeless tobacco: Not on file   Substance and Sexual Activity    Alcohol use: Not on file    Drug use: Not on file    Sexual activity: Not on file   Other Topics Concern    Not on file   Social History Narrative    Not on file     Social Determinants of Health     Financial Resource Strain: Not on file   Food Insecurity: Not on file   Transportation Needs: Not on file   Physical Activity: Not on file   Stress: Not on file   Social Connections: Not on file   Intimate Partner Violence: Not on file   Housing Stability: Not on file       Allergies: Aspirin, Levofloxacin, and Sulfa antibiotics  NF MEDICATIONS REVIEWED    ROS:   Constitutional: There are no reports of behavioral issues, change in appetite, fever, or increased weakness. No bleeding issues.   Respiratory: denies SOB, dyspnea, dyspnea on

## 2022-10-03 DIAGNOSIS — Z00.00 HEALTHCARE MAINTENANCE: ICD-10-CM

## 2022-10-06 DIAGNOSIS — F03.90 DEMENTIA WITHOUT BEHAVIORAL DISTURBANCE (HCC): ICD-10-CM

## 2022-10-06 DIAGNOSIS — E55.9 VITAMIN D DEFICIENCY: ICD-10-CM

## 2022-10-06 DIAGNOSIS — Z00.00 HEALTHCARE MAINTENANCE: ICD-10-CM

## 2022-10-06 RX ORDER — MEMANTINE HYDROCHLORIDE 5 MG/1
TABLET ORAL
Qty: 180 TABLET | Refills: 1 | Status: SHIPPED | OUTPATIENT
Start: 2022-10-06

## 2022-10-06 RX ORDER — CHLORAL HYDRATE 500 MG
CAPSULE ORAL
Qty: 90 CAPSULE | Refills: 1 | Status: SHIPPED | OUTPATIENT
Start: 2022-10-06

## 2022-10-06 RX ORDER — CHLORAL HYDRATE 500 MG
CAPSULE ORAL
Qty: 90 CAPSULE | Refills: 1 | Status: CANCELLED | OUTPATIENT
Start: 2022-10-06

## 2022-10-06 RX ORDER — PSYLLIUM HUSK 0.4 G
CAPSULE ORAL
Qty: 90 TABLET | Refills: 1 | Status: SHIPPED | OUTPATIENT
Start: 2022-10-06

## 2022-12-14 DIAGNOSIS — E78.5 HYPERLIPIDEMIA, UNSPECIFIED HYPERLIPIDEMIA TYPE: ICD-10-CM

## 2022-12-19 RX ORDER — PRAVASTATIN SODIUM 10 MG
TABLET ORAL
Qty: 90 TABLET | Refills: 1 | Status: SHIPPED | OUTPATIENT
Start: 2022-12-19

## 2024-03-11 ENCOUNTER — APPOINTMENT (OUTPATIENT)
Dept: RADIOLOGY | Facility: HOSPITAL | Age: 89
DRG: 659 | End: 2024-03-11
Payer: MEDICARE

## 2024-03-11 ENCOUNTER — HOSPITAL ENCOUNTER (INPATIENT)
Facility: HOSPITAL | Age: 89
LOS: 10 days | Discharge: NURSING, REHAB OR LONG-TERM CARE | DRG: 659 | End: 2024-03-21
Attending: EMERGENCY MEDICINE | Admitting: INTERNAL MEDICINE
Payer: MEDICARE

## 2024-03-11 ENCOUNTER — APPOINTMENT (OUTPATIENT)
Dept: CARDIOLOGY | Facility: HOSPITAL | Age: 89
DRG: 659 | End: 2024-03-11
Payer: MEDICARE

## 2024-03-11 DIAGNOSIS — E87.5 HYPERKALEMIA: ICD-10-CM

## 2024-03-11 DIAGNOSIS — Z79.4 TYPE 2 DIABETES MELLITUS WITH DIABETIC NEPHROPATHY, WITH LONG-TERM CURRENT USE OF INSULIN (MULTI): ICD-10-CM

## 2024-03-11 DIAGNOSIS — N13.39 OTHER HYDRONEPHROSIS: ICD-10-CM

## 2024-03-11 DIAGNOSIS — R33.9 URINARY RETENTION: ICD-10-CM

## 2024-03-11 DIAGNOSIS — N25.89 UREMIC ACIDOSIS: ICD-10-CM

## 2024-03-11 DIAGNOSIS — E11.21 TYPE 2 DIABETES MELLITUS WITH DIABETIC NEPHROPATHY, WITH LONG-TERM CURRENT USE OF INSULIN (MULTI): ICD-10-CM

## 2024-03-11 DIAGNOSIS — N17.9 AKI (ACUTE KIDNEY INJURY) (CMS-HCC): Primary | ICD-10-CM

## 2024-03-11 LAB
ALBUMIN SERPL BCP-MCNC: 3.6 G/DL (ref 3.4–5)
ALBUMIN SERPL BCP-MCNC: 3.9 G/DL (ref 3.4–5)
ALP SERPL-CCNC: 60 U/L (ref 33–136)
ALT SERPL W P-5'-P-CCNC: 10 U/L (ref 10–52)
ANION GAP BLDA CALCULATED.4IONS-SCNC: 13 MMO/L (ref 10–25)
ANION GAP SERPL CALC-SCNC: 15 MMOL/L (ref 10–20)
ANION GAP SERPL CALC-SCNC: 15 MMOL/L (ref 10–20)
ANION GAP SERPL CALC-SCNC: 16 MMOL/L (ref 10–20)
ANION GAP SERPL CALC-SCNC: 18 MMOL/L (ref 10–20)
APPEARANCE UR: CLEAR
AST SERPL W P-5'-P-CCNC: 23 U/L (ref 9–39)
BASE EXCESS BLDA CALC-SCNC: -4.3 MMOL/L (ref -2–3)
BASOPHILS # BLD AUTO: 0.06 X10*3/UL (ref 0–0.1)
BASOPHILS NFR BLD AUTO: 0.6 %
BILIRUB SERPL-MCNC: 0.5 MG/DL (ref 0–1.2)
BILIRUB UR STRIP.AUTO-MCNC: NEGATIVE MG/DL
BODY TEMPERATURE: ABNORMAL
BUN SERPL-MCNC: 74 MG/DL (ref 6–23)
BUN SERPL-MCNC: 76 MG/DL (ref 6–23)
CA-I BLDA-SCNC: 1.28 MMOL/L (ref 1.1–1.33)
CALCIUM SERPL-MCNC: 9.2 MG/DL (ref 8.6–10.3)
CALCIUM SERPL-MCNC: 9.5 MG/DL (ref 8.6–10.3)
CALCIUM SERPL-MCNC: 9.5 MG/DL (ref 8.6–10.3)
CALCIUM SERPL-MCNC: 9.8 MG/DL (ref 8.6–10.3)
CARDIAC TROPONIN I PNL SERPL HS: 15 NG/L (ref 0–20)
CARDIAC TROPONIN I PNL SERPL HS: 16 NG/L (ref 0–20)
CHLORIDE BLDA-SCNC: 110 MMOL/L (ref 98–107)
CHLORIDE SERPL-SCNC: 105 MMOL/L (ref 98–107)
CHLORIDE SERPL-SCNC: 107 MMOL/L (ref 98–107)
CHLORIDE SERPL-SCNC: 110 MMOL/L (ref 98–107)
CHLORIDE SERPL-SCNC: 110 MMOL/L (ref 98–107)
CHLORIDE UR-SCNC: 116 MMOL/L
CHLORIDE/CREATININE (MMOL/G) IN URINE: 207 MMOL/G CREAT (ref 23–275)
CO2 SERPL-SCNC: 21 MMOL/L (ref 21–32)
CO2 SERPL-SCNC: 22 MMOL/L (ref 21–32)
COLOR UR: ABNORMAL
CREAT SERPL-MCNC: 6.35 MG/DL (ref 0.5–1.3)
CREAT SERPL-MCNC: 6.48 MG/DL (ref 0.5–1.3)
CREAT SERPL-MCNC: 6.48 MG/DL (ref 0.5–1.3)
CREAT SERPL-MCNC: 6.66 MG/DL (ref 0.5–1.3)
CREAT UR-MCNC: 56 MG/DL (ref 20–370)
CREAT UR-MCNC: 56 MG/DL (ref 20–370)
EGFRCR SERPLBLD CKD-EPI 2021: 7 ML/MIN/1.73M*2
EGFRCR SERPLBLD CKD-EPI 2021: 8 ML/MIN/1.73M*2
EOSINOPHIL # BLD AUTO: 0.26 X10*3/UL (ref 0–0.4)
EOSINOPHIL NFR BLD AUTO: 2.5 %
ERYTHROCYTE [DISTWIDTH] IN BLOOD BY AUTOMATED COUNT: 13 % (ref 11.5–14.5)
GLUCOSE BLD MANUAL STRIP-MCNC: 122 MG/DL (ref 74–99)
GLUCOSE BLD MANUAL STRIP-MCNC: 86 MG/DL (ref 74–99)
GLUCOSE BLDA-MCNC: 89 MG/DL (ref 74–99)
GLUCOSE SERPL-MCNC: 125 MG/DL (ref 74–99)
GLUCOSE SERPL-MCNC: 86 MG/DL (ref 74–99)
GLUCOSE SERPL-MCNC: 93 MG/DL (ref 74–99)
GLUCOSE SERPL-MCNC: 93 MG/DL (ref 74–99)
GLUCOSE UR STRIP.AUTO-MCNC: ABNORMAL MG/DL
HCO3 BLDA-SCNC: 20.1 MMOL/L (ref 22–26)
HCT VFR BLD AUTO: 39.8 % (ref 41–52)
HCT VFR BLD EST: 36 % (ref 41–52)
HGB BLD-MCNC: 12.4 G/DL (ref 13.5–17.5)
HGB BLDA-MCNC: 12.1 G/DL (ref 13.5–17.5)
IMM GRANULOCYTES # BLD AUTO: 0.04 X10*3/UL (ref 0–0.5)
IMM GRANULOCYTES NFR BLD AUTO: 0.4 % (ref 0–0.9)
INHALED O2 CONCENTRATION: 21 %
KETONES UR STRIP.AUTO-MCNC: NEGATIVE MG/DL
LACTATE BLDA-SCNC: 0.7 MMOL/L (ref 0.4–2)
LEUKOCYTE ESTERASE UR QL STRIP.AUTO: NEGATIVE
LYMPHOCYTES # BLD AUTO: 0.89 X10*3/UL (ref 0.8–3)
LYMPHOCYTES NFR BLD AUTO: 8.5 %
MAGNESIUM SERPL-MCNC: 2.33 MG/DL (ref 1.6–2.4)
MCH RBC QN AUTO: 30 PG (ref 26–34)
MCHC RBC AUTO-ENTMCNC: 31.2 G/DL (ref 32–36)
MCV RBC AUTO: 96 FL (ref 80–100)
MONOCYTES # BLD AUTO: 0.52 X10*3/UL (ref 0.05–0.8)
MONOCYTES NFR BLD AUTO: 5 %
NEUTROPHILS # BLD AUTO: 8.65 X10*3/UL (ref 1.6–5.5)
NEUTROPHILS NFR BLD AUTO: 83 %
NITRITE UR QL STRIP.AUTO: NEGATIVE
NRBC BLD-RTO: 0 /100 WBCS (ref 0–0)
OXYHGB MFR BLDA: 96.8 % (ref 94–98)
PCO2 BLDA: 34 MM HG (ref 38–42)
PH BLDA: 7.38 PH (ref 7.38–7.42)
PH UR STRIP.AUTO: 7 [PH]
PHOSPHATE SERPL-MCNC: 4.1 MG/DL (ref 2.5–4.9)
PLATELET # BLD AUTO: 230 X10*3/UL (ref 150–450)
PO2 BLDA: 93 MM HG (ref 85–95)
POTASSIUM BLDA-SCNC: 5.8 MMOL/L (ref 3.5–5.3)
POTASSIUM SERPL-SCNC: 4.6 MMOL/L (ref 3.5–5.3)
POTASSIUM SERPL-SCNC: 5.6 MMOL/L (ref 3.5–5.3)
POTASSIUM SERPL-SCNC: 5.6 MMOL/L (ref 3.5–5.3)
POTASSIUM SERPL-SCNC: 6.4 MMOL/L (ref 3.5–5.3)
POTASSIUM UR-SCNC: 17 MMOL/L
POTASSIUM/CREAT UR-RTO: 30 MMOL/G CREAT
PROT SERPL-MCNC: 7.9 G/DL (ref 6.4–8.2)
PROT UR STRIP.AUTO-MCNC: ABNORMAL MG/DL
RBC # BLD AUTO: 4.13 X10*6/UL (ref 4.5–5.9)
RBC # UR STRIP.AUTO: ABNORMAL /UL
RBC #/AREA URNS AUTO: ABNORMAL /HPF
SAO2 % BLDA: 99 % (ref 94–100)
SODIUM BLDA-SCNC: 137 MMOL/L (ref 136–145)
SODIUM SERPL-SCNC: 139 MMOL/L (ref 136–145)
SODIUM SERPL-SCNC: 139 MMOL/L (ref 136–145)
SODIUM SERPL-SCNC: 140 MMOL/L (ref 136–145)
SODIUM SERPL-SCNC: 140 MMOL/L (ref 136–145)
SODIUM UR-SCNC: 137 MMOL/L
SODIUM/CREAT UR-RTO: 245 MMOL/G CREAT
SP GR UR STRIP.AUTO: 1.01
UREA/CREAT UR-SRTO: 7.3 G/G CREAT
UROBILINOGEN UR STRIP.AUTO-MCNC: <2 MG/DL
UUN UR-MCNC: 410 MG/DL
WBC # BLD AUTO: 10.4 X10*3/UL (ref 4.4–11.3)
WBC #/AREA URNS AUTO: ABNORMAL /HPF

## 2024-03-11 PROCEDURE — 76770 US EXAM ABDO BACK WALL COMP: CPT | Performed by: RADIOLOGY

## 2024-03-11 PROCEDURE — 84075 ASSAY ALKALINE PHOSPHATASE: CPT

## 2024-03-11 PROCEDURE — 93005 ELECTROCARDIOGRAM TRACING: CPT

## 2024-03-11 PROCEDURE — 84132 ASSAY OF SERUM POTASSIUM: CPT

## 2024-03-11 PROCEDURE — 81001 URINALYSIS AUTO W/SCOPE: CPT | Performed by: INTERNAL MEDICINE

## 2024-03-11 PROCEDURE — 36415 COLL VENOUS BLD VENIPUNCTURE: CPT

## 2024-03-11 PROCEDURE — 84132 ASSAY OF SERUM POTASSIUM: CPT | Performed by: STUDENT IN AN ORGANIZED HEALTH CARE EDUCATION/TRAINING PROGRAM

## 2024-03-11 PROCEDURE — 2500000004 HC RX 250 GENERAL PHARMACY W/ HCPCS (ALT 636 FOR OP/ED): Performed by: INTERNAL MEDICINE

## 2024-03-11 PROCEDURE — 2500000002 HC RX 250 W HCPCS SELF ADMINISTERED DRUGS (ALT 637 FOR MEDICARE OP, ALT 636 FOR OP/ED)

## 2024-03-11 PROCEDURE — 96374 THER/PROPH/DIAG INJ IV PUSH: CPT | Mod: 59

## 2024-03-11 PROCEDURE — 99285 EMERGENCY DEPT VISIT HI MDM: CPT | Performed by: EMERGENCY MEDICINE

## 2024-03-11 PROCEDURE — 36600 WITHDRAWAL OF ARTERIAL BLOOD: CPT

## 2024-03-11 PROCEDURE — 99285 EMERGENCY DEPT VISIT HI MDM: CPT | Mod: 25

## 2024-03-11 PROCEDURE — 71045 X-RAY EXAM CHEST 1 VIEW: CPT

## 2024-03-11 PROCEDURE — 82947 ASSAY GLUCOSE BLOOD QUANT: CPT

## 2024-03-11 PROCEDURE — 93010 ELECTROCARDIOGRAM REPORT: CPT | Performed by: INTERNAL MEDICINE

## 2024-03-11 PROCEDURE — 84540 ASSAY OF URINE/UREA-N: CPT | Mod: STJLAB | Performed by: INTERNAL MEDICINE

## 2024-03-11 PROCEDURE — 84484 ASSAY OF TROPONIN QUANT: CPT

## 2024-03-11 PROCEDURE — 85025 COMPLETE CBC W/AUTO DIFF WBC: CPT

## 2024-03-11 PROCEDURE — 76770 US EXAM ABDO BACK WALL COMP: CPT

## 2024-03-11 PROCEDURE — 2500000001 HC RX 250 WO HCPCS SELF ADMINISTERED DRUGS (ALT 637 FOR MEDICARE OP): Performed by: INTERNAL MEDICINE

## 2024-03-11 PROCEDURE — 51798 US URINE CAPACITY MEASURE: CPT

## 2024-03-11 PROCEDURE — 83735 ASSAY OF MAGNESIUM: CPT

## 2024-03-11 PROCEDURE — 99223 1ST HOSP IP/OBS HIGH 75: CPT | Performed by: INTERNAL MEDICINE

## 2024-03-11 PROCEDURE — 2500000004 HC RX 250 GENERAL PHARMACY W/ HCPCS (ALT 636 FOR OP/ED)

## 2024-03-11 PROCEDURE — 71045 X-RAY EXAM CHEST 1 VIEW: CPT | Performed by: RADIOLOGY

## 2024-03-11 PROCEDURE — 2060000001 HC INTERMEDIATE ICU ROOM DAILY

## 2024-03-11 PROCEDURE — 84132 ASSAY OF SERUM POTASSIUM: CPT | Performed by: INTERNAL MEDICINE

## 2024-03-11 PROCEDURE — 82436 ASSAY OF URINE CHLORIDE: CPT | Mod: STJLAB | Performed by: INTERNAL MEDICINE

## 2024-03-11 RX ORDER — MEMANTINE HYDROCHLORIDE 10 MG/1
10 TABLET ORAL 2 TIMES DAILY
Status: DISCONTINUED | OUTPATIENT
Start: 2024-03-11 | End: 2024-03-21 | Stop reason: HOSPADM

## 2024-03-11 RX ORDER — CITALOPRAM 10 MG/1
10 TABLET ORAL DAILY
COMMUNITY

## 2024-03-11 RX ORDER — PRAVASTATIN SODIUM 20 MG/1
10 TABLET ORAL NIGHTLY
Status: DISCONTINUED | OUTPATIENT
Start: 2024-03-11 | End: 2024-03-21 | Stop reason: HOSPADM

## 2024-03-11 RX ORDER — SCOLOPAMINE TRANSDERMAL SYSTEM 1 MG/1
1 PATCH, EXTENDED RELEASE TRANSDERMAL
Status: DISCONTINUED | OUTPATIENT
Start: 2024-03-11 | End: 2024-03-21 | Stop reason: HOSPADM

## 2024-03-11 RX ORDER — LEVOTHYROXINE SODIUM 75 UG/1
75 TABLET ORAL DAILY
COMMUNITY

## 2024-03-11 RX ORDER — PRAVASTATIN SODIUM 10 MG/1
10 TABLET ORAL DAILY
COMMUNITY

## 2024-03-11 RX ORDER — ACETAMINOPHEN 325 MG/1
650 TABLET ORAL EVERY 4 HOURS PRN
COMMUNITY

## 2024-03-11 RX ORDER — ACETAMINOPHEN 500 MG
5 TABLET ORAL NIGHTLY
Status: DISCONTINUED | OUTPATIENT
Start: 2024-03-11 | End: 2024-03-21 | Stop reason: HOSPADM

## 2024-03-11 RX ORDER — DEXTROSE 50 % IN WATER (D50W) INTRAVENOUS SYRINGE
25
Status: DISCONTINUED | OUTPATIENT
Start: 2024-03-11 | End: 2024-03-21 | Stop reason: HOSPADM

## 2024-03-11 RX ORDER — POLYETHYLENE GLYCOL 3350 17 G/17G
17 POWDER, FOR SOLUTION ORAL DAILY
Status: DISCONTINUED | OUTPATIENT
Start: 2024-03-11 | End: 2024-03-21 | Stop reason: HOSPADM

## 2024-03-11 RX ORDER — HYDRALAZINE HYDROCHLORIDE 20 MG/ML
10 INJECTION INTRAMUSCULAR; INTRAVENOUS EVERY 6 HOURS PRN
Status: DISCONTINUED | OUTPATIENT
Start: 2024-03-11 | End: 2024-03-21 | Stop reason: HOSPADM

## 2024-03-11 RX ORDER — DOCUSATE SODIUM 100 MG/1
100 CAPSULE, LIQUID FILLED ORAL 2 TIMES DAILY PRN
COMMUNITY

## 2024-03-11 RX ORDER — HEPARIN SODIUM 5000 [USP'U]/ML
5000 INJECTION, SOLUTION INTRAVENOUS; SUBCUTANEOUS EVERY 8 HOURS SCHEDULED
Status: DISCONTINUED | OUTPATIENT
Start: 2024-03-11 | End: 2024-03-21 | Stop reason: HOSPADM

## 2024-03-11 RX ORDER — INSULIN GLARGINE 100 [IU]/ML
25 INJECTION, SOLUTION SUBCUTANEOUS EVERY MORNING
COMMUNITY
End: 2024-03-21 | Stop reason: HOSPADM

## 2024-03-11 RX ORDER — INSULIN LISPRO 100 [IU]/ML
INJECTION, SOLUTION INTRAVENOUS; SUBCUTANEOUS
COMMUNITY

## 2024-03-11 RX ORDER — GUAIFENESIN/DEXTROMETHORPHAN 100-10MG/5
15 SYRUP ORAL EVERY 4 HOURS PRN
COMMUNITY
End: 2024-03-21 | Stop reason: HOSPADM

## 2024-03-11 RX ORDER — BISMUTH SUBSALICYLATE 262 MG
1 TABLET,CHEWABLE ORAL DAILY
COMMUNITY

## 2024-03-11 RX ORDER — ACETAMINOPHEN 500 MG
5 TABLET ORAL NIGHTLY
COMMUNITY

## 2024-03-11 RX ORDER — LEVOTHYROXINE SODIUM 75 UG/1
75 TABLET ORAL
Status: DISCONTINUED | OUTPATIENT
Start: 2024-03-12 | End: 2024-03-21 | Stop reason: HOSPADM

## 2024-03-11 RX ORDER — VIT C/E/ZN/COPPR/LUTEIN/ZEAXAN 250MG-90MG
25 CAPSULE ORAL DAILY
COMMUNITY

## 2024-03-11 RX ORDER — GLUCOSAM/CHONDRO/HERB 149/HYAL 750-100 MG
1000 TABLET ORAL DAILY
COMMUNITY

## 2024-03-11 RX ORDER — PRAMIPEXOLE DIHYDROCHLORIDE 0.12 MG/1
0.12 TABLET ORAL DAILY
COMMUNITY

## 2024-03-11 RX ORDER — PRAMIPEXOLE DIHYDROCHLORIDE 0.25 MG/1
0.12 TABLET ORAL DAILY
Status: DISCONTINUED | OUTPATIENT
Start: 2024-03-11 | End: 2024-03-21 | Stop reason: HOSPADM

## 2024-03-11 RX ORDER — INSULIN LISPRO 100 [IU]/ML
0-10 INJECTION, SOLUTION INTRAVENOUS; SUBCUTANEOUS
Status: DISCONTINUED | OUTPATIENT
Start: 2024-03-11 | End: 2024-03-21 | Stop reason: HOSPADM

## 2024-03-11 RX ORDER — DEXTROSE MONOHYDRATE 100 MG/ML
0.3 INJECTION, SOLUTION INTRAVENOUS ONCE AS NEEDED
Status: DISCONTINUED | OUTPATIENT
Start: 2024-03-11 | End: 2024-03-21 | Stop reason: HOSPADM

## 2024-03-11 RX ORDER — INSULIN GLARGINE 100 [IU]/ML
5 INJECTION, SOLUTION SUBCUTANEOUS NIGHTLY
COMMUNITY
End: 2024-03-21 | Stop reason: HOSPADM

## 2024-03-11 RX ORDER — METFORMIN HYDROCHLORIDE 500 MG/1
500 TABLET ORAL 2 TIMES DAILY
Status: ON HOLD | COMMUNITY
End: 2024-03-21 | Stop reason: SINTOL

## 2024-03-11 RX ORDER — CITALOPRAM 10 MG/1
10 TABLET ORAL DAILY
Status: DISCONTINUED | OUTPATIENT
Start: 2024-03-11 | End: 2024-03-21 | Stop reason: HOSPADM

## 2024-03-11 RX ORDER — MEMANTINE HYDROCHLORIDE 10 MG/1
10 TABLET ORAL 2 TIMES DAILY
COMMUNITY

## 2024-03-11 RX ADMIN — Medication 5 MG: at 22:08

## 2024-03-11 RX ADMIN — SODIUM ZIRCONIUM CYCLOSILICATE 10 G: 10 POWDER, FOR SUSPENSION ORAL at 14:39

## 2024-03-11 RX ADMIN — SODIUM BICARBONATE 100 ML/HR: 84 INJECTION, SOLUTION INTRAVENOUS at 14:38

## 2024-03-11 RX ADMIN — PRAVASTATIN SODIUM 10 MG: 20 TABLET ORAL at 22:08

## 2024-03-11 RX ADMIN — HEPARIN SODIUM 5000 UNITS: 5000 INJECTION INTRAVENOUS; SUBCUTANEOUS at 23:45

## 2024-03-11 RX ADMIN — MEMANTINE 10 MG: 10 TABLET ORAL at 22:08

## 2024-03-11 RX ADMIN — HEPARIN SODIUM 5000 UNITS: 5000 INJECTION INTRAVENOUS; SUBCUTANEOUS at 16:37

## 2024-03-11 RX ADMIN — HYDRALAZINE HYDROCHLORIDE 10 MG: 20 INJECTION INTRAMUSCULAR; INTRAVENOUS at 16:37

## 2024-03-11 SDOH — HEALTH STABILITY: MENTAL HEALTH: HOW OFTEN DO YOU HAVE A DRINK CONTAINING ALCOHOL?: NEVER

## 2024-03-11 SDOH — SOCIAL STABILITY: SOCIAL INSECURITY: DOES ANYONE TRY TO KEEP YOU FROM HAVING/CONTACTING OTHER FRIENDS OR DOING THINGS OUTSIDE YOUR HOME?: NO

## 2024-03-11 SDOH — HEALTH STABILITY: MENTAL HEALTH: HOW OFTEN DO YOU HAVE 6 OR MORE DRINKS ON ONE OCCASION?: NEVER

## 2024-03-11 SDOH — HEALTH STABILITY: MENTAL HEALTH: HOW MANY STANDARD DRINKS CONTAINING ALCOHOL DO YOU HAVE ON A TYPICAL DAY?: PATIENT DOES NOT DRINK

## 2024-03-11 SDOH — SOCIAL STABILITY: SOCIAL INSECURITY: ARE THERE ANY APPARENT SIGNS OF INJURIES/BEHAVIORS THAT COULD BE RELATED TO ABUSE/NEGLECT?: NO

## 2024-03-11 SDOH — SOCIAL STABILITY: SOCIAL INSECURITY: HAS ANYONE EVER THREATENED TO HURT YOUR FAMILY OR YOUR PETS?: NO

## 2024-03-11 SDOH — SOCIAL STABILITY: SOCIAL INSECURITY: HAVE YOU HAD THOUGHTS OF HARMING ANYONE ELSE?: NO

## 2024-03-11 SDOH — SOCIAL STABILITY: SOCIAL INSECURITY: WERE YOU ABLE TO COMPLETE ALL THE BEHAVIORAL HEALTH SCREENINGS?: YES

## 2024-03-11 SDOH — SOCIAL STABILITY: SOCIAL INSECURITY: DO YOU FEEL ANYONE HAS EXPLOITED OR TAKEN ADVANTAGE OF YOU FINANCIALLY OR OF YOUR PERSONAL PROPERTY?: NO

## 2024-03-11 SDOH — SOCIAL STABILITY: SOCIAL INSECURITY: ABUSE: ADULT

## 2024-03-11 SDOH — SOCIAL STABILITY: SOCIAL INSECURITY: DO YOU FEEL UNSAFE GOING BACK TO THE PLACE WHERE YOU ARE LIVING?: NO

## 2024-03-11 SDOH — SOCIAL STABILITY: SOCIAL INSECURITY: ARE YOU OR HAVE YOU BEEN THREATENED OR ABUSED PHYSICALLY, EMOTIONALLY, OR SEXUALLY BY ANYONE?: NO

## 2024-03-11 ASSESSMENT — COGNITIVE AND FUNCTIONAL STATUS - GENERAL
HELP NEEDED FOR BATHING: A LOT
DRESSING REGULAR LOWER BODY CLOTHING: A LOT
MOBILITY SCORE: 13
DRESSING REGULAR UPPER BODY CLOTHING: A LITTLE
STANDING UP FROM CHAIR USING ARMS: A LOT
MOVING TO AND FROM BED TO CHAIR: A LITTLE
TURNING FROM BACK TO SIDE WHILE IN FLAT BAD: A LITTLE
WALKING IN HOSPITAL ROOM: TOTAL
TOILETING: A LOT
DAILY ACTIVITIY SCORE: 15
TURNING FROM BACK TO SIDE WHILE IN FLAT BAD: A LITTLE
MOBILITY SCORE: 13
CLIMB 3 TO 5 STEPS WITH RAILING: TOTAL
EATING MEALS: A LITTLE
HELP NEEDED FOR BATHING: A LOT
DRESSING REGULAR LOWER BODY CLOTHING: A LOT
PERSONAL GROOMING: A LITTLE
MOVING FROM LYING ON BACK TO SITTING ON SIDE OF FLAT BED WITH BEDRAILS: A LITTLE
TOILETING: A LOT
CLIMB 3 TO 5 STEPS WITH RAILING: TOTAL
STANDING UP FROM CHAIR USING ARMS: A LOT
EATING MEALS: A LITTLE
MOVING TO AND FROM BED TO CHAIR: A LITTLE
DRESSING REGULAR UPPER BODY CLOTHING: A LITTLE
PERSONAL GROOMING: A LITTLE
MOVING FROM LYING ON BACK TO SITTING ON SIDE OF FLAT BED WITH BEDRAILS: A LITTLE
DAILY ACTIVITIY SCORE: 15
WALKING IN HOSPITAL ROOM: TOTAL
PATIENT BASELINE BEDBOUND: NO

## 2024-03-11 ASSESSMENT — LIFESTYLE VARIABLES
HOW MANY STANDARD DRINKS CONTAINING ALCOHOL DO YOU HAVE ON A TYPICAL DAY: PATIENT DOES NOT DRINK
HAVE YOU EVER FELT YOU SHOULD CUT DOWN ON YOUR DRINKING: NO
EVER HAD A DRINK FIRST THING IN THE MORNING TO STEADY YOUR NERVES TO GET RID OF A HANGOVER: NO
SKIP TO QUESTIONS 9-10: 1
HOW OFTEN DO YOU HAVE 6 OR MORE DRINKS ON ONE OCCASION: NEVER
EVER FELT BAD OR GUILTY ABOUT YOUR DRINKING: NO
AUDIT-C TOTAL SCORE: 0
SKIP TO QUESTIONS 9-10: 1
HOW OFTEN DO YOU HAVE A DRINK CONTAINING ALCOHOL: NEVER
AUDIT-C TOTAL SCORE: 0
SKIP TO QUESTIONS 9-10: 1
AUDIT-C TOTAL SCORE: 0
AUDIT-C TOTAL SCORE: 0
HAVE PEOPLE ANNOYED YOU BY CRITICIZING YOUR DRINKING: NO

## 2024-03-11 ASSESSMENT — ACTIVITIES OF DAILY LIVING (ADL)
ASSISTIVE_DEVICE: WHEELCHAIR;OTHER (COMMENT)
FEEDING YOURSELF: INDEPENDENT
BATHING: NEEDS ASSISTANCE
WALKS IN HOME: DEPENDENT
HEARING - LEFT EAR: DIFFICULTY WITH NOISE
TOILETING: NEEDS ASSISTANCE
ADEQUATE_TO_COMPLETE_ADL: YES
JUDGMENT_ADEQUATE_SAFELY_COMPLETE_DAILY_ACTIVITIES: YES
GROOMING: NEEDS ASSISTANCE
PATIENT'S MEMORY ADEQUATE TO SAFELY COMPLETE DAILY ACTIVITIES?: YES
LACK_OF_TRANSPORTATION: NO
DRESSING YOURSELF: NEEDS ASSISTANCE
HEARING - RIGHT EAR: DIFFICULTY WITH NOISE

## 2024-03-11 ASSESSMENT — PAIN SCALES - GENERAL
PAINLEVEL_OUTOF10: 0 - NO PAIN

## 2024-03-11 ASSESSMENT — PATIENT HEALTH QUESTIONNAIRE - PHQ9
2. FEELING DOWN, DEPRESSED OR HOPELESS: NOT AT ALL
SUM OF ALL RESPONSES TO PHQ9 QUESTIONS 1 & 2: 0
1. LITTLE INTEREST OR PLEASURE IN DOING THINGS: NOT AT ALL

## 2024-03-11 ASSESSMENT — COLUMBIA-SUICIDE SEVERITY RATING SCALE - C-SSRS
2. HAVE YOU ACTUALLY HAD ANY THOUGHTS OF KILLING YOURSELF?: NO
6. HAVE YOU EVER DONE ANYTHING, STARTED TO DO ANYTHING, OR PREPARED TO DO ANYTHING TO END YOUR LIFE?: NO
1. IN THE PAST MONTH, HAVE YOU WISHED YOU WERE DEAD OR WISHED YOU COULD GO TO SLEEP AND NOT WAKE UP?: NO

## 2024-03-11 ASSESSMENT — PAIN - FUNCTIONAL ASSESSMENT
PAIN_FUNCTIONAL_ASSESSMENT: 0-10

## 2024-03-11 NOTE — NURSING NOTE
1600- Patient arrived on unit. Patient hooked up to monitor. Daughter at bedside. Call light placed with reach and bed alarm on.    EOS- No acute changes since arriving on the unit. Attempted to help patient eat but he is refusing. Urine sent down. One dose of hydralazine given with decrease in blood pressure. Safety maintained and call light within reach.

## 2024-03-11 NOTE — H&P
HISTORY AND PHYSICAL    History Of Present Illness  Rogerio Miller is a 88 y.o. male presenting with abnormal labs, patient has been feeling weak and there is questionable report about his poor oral intake labs were obtained outpatient and his creatinine was 6.1 and potassium 6.1 came for further evaluation, patient reported generalized weakness he is alert oriented x 1 to himself only however he was alert oriented x 2 to the ED physician and the daughter earlier, daughter at the bedside, patient denies any complaints no nausea or vomiting no constipation or diarrhea, no fever no chills, no chest pain or shortness of breath, no recent change in his medications.    On arrival to the ED blood pressure was elevated 196/84, otherwise stable vital signs trending down without any intervention, labs showed potassium 6.4 but hemolyzed, creatinine 6.6, hemoglobin 12.2 at baseline, ABG was done and showed lactic acid of 0.7, potassium on the ABG was 5.8, nephrology consulted from the ED Yanez catheter was ordered not placed yet, started on sodium bicarb drip 100 cc/h, Lokelma 10 mg given in the ED and labs to be repeated later, admitted for further treatment for WINSTON and hyperkalemia.     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     Social History  He has no history on file for tobacco use, alcohol use, and drug use.    Family History  No family history on file.     Allergies  Aspirin, Levofloxacin, and Sulfa (sulfonamide antibiotics)    Review of Systems   ROS: 12 systems reviewed and  "negative except per HPI above     Physical Exam by System:     Constitutional: elderly, awake/alert/oriented x1, no distress, alert and cooperative   ENMT: mucous membranes dry   Head/Neck: Neck supple   Respiratory/Thorax: Patent airways, CTAB, on RA   Cardiovascular: Regular, rate and rhythm, no murmurs, normal S 1and S 2   Gastrointestinal: Nondistended, soft, generalized tenderness   Musculoskeletal: ROM intact, no joint swelling, normal strength   Extremities: normal extremities, no edema   Neurological: alert and oriented x1, intact senses, motor, response and reflexes, normal strength       Last Recorded Vitals  Blood pressure 139/77, pulse 68, temperature 36.9 °C (98.4 °F), temperature source Temporal, resp. rate 15, height 1.778 m (5' 10\"), weight 90.7 kg (200 lb), SpO2 100 %.    Relevant Results  Results for orders placed or performed during the hospital encounter of 03/11/24 (from the past 24 hour(s))   CBC and Auto Differential   Result Value Ref Range    WBC 10.4 4.4 - 11.3 x10*3/uL    nRBC 0.0 0.0 - 0.0 /100 WBCs    RBC 4.13 (L) 4.50 - 5.90 x10*6/uL    Hemoglobin 12.4 (L) 13.5 - 17.5 g/dL    Hematocrit 39.8 (L) 41.0 - 52.0 %    MCV 96 80 - 100 fL    MCH 30.0 26.0 - 34.0 pg    MCHC 31.2 (L) 32.0 - 36.0 g/dL    RDW 13.0 11.5 - 14.5 %    Platelets 230 150 - 450 x10*3/uL    Neutrophils % 83.0 40.0 - 80.0 %    Immature Granulocytes %, Automated 0.4 0.0 - 0.9 %    Lymphocytes % 8.5 13.0 - 44.0 %    Monocytes % 5.0 2.0 - 10.0 %    Eosinophils % 2.5 0.0 - 6.0 %    Basophils % 0.6 0.0 - 2.0 %    Neutrophils Absolute 8.65 (H) 1.60 - 5.50 x10*3/uL    Immature Granulocytes Absolute, Automated 0.04 0.00 - 0.50 x10*3/uL    Lymphocytes Absolute 0.89 0.80 - 3.00 x10*3/uL    Monocytes Absolute 0.52 0.05 - 0.80 x10*3/uL    Eosinophils Absolute 0.26 0.00 - 0.40 x10*3/uL    Basophils Absolute 0.06 0.00 - 0.10 x10*3/uL   Comprehensive Metabolic Panel   Result Value Ref Range    Glucose 86 74 - 99 mg/dL    Sodium 139 136 - " 145 mmol/L    Potassium 6.4 (HH) 3.5 - 5.3 mmol/L    Chloride 107 98 - 107 mmol/L    Bicarbonate 22 21 - 32 mmol/L    Anion Gap 16 10 - 20 mmol/L    Urea Nitrogen 76 (H) 6 - 23 mg/dL    Creatinine 6.66 (H) 0.50 - 1.30 mg/dL    eGFR 7 (L) >60 mL/min/1.73m*2    Calcium 9.8 8.6 - 10.3 mg/dL    Albumin 3.9 3.4 - 5.0 g/dL    Alkaline Phosphatase 60 33 - 136 U/L    Total Protein 7.9 6.4 - 8.2 g/dL    AST 23 9 - 39 U/L    Bilirubin, Total 0.5 0.0 - 1.2 mg/dL    ALT 10 10 - 52 U/L   Magnesium   Result Value Ref Range    Magnesium 2.33 1.60 - 2.40 mg/dL   Troponin I, High Sensitivity, Initial   Result Value Ref Range    Troponin I, High Sensitivity 16 0 - 20 ng/L   Blood Gas Arterial Full Panel   Result Value Ref Range    POCT pH, Arterial 7.38 7.38 - 7.42 pH    POCT pCO2, Arterial 34 (L) 38 - 42 mm Hg    POCT pO2, Arterial 93 85 - 95 mm Hg    POCT SO2, Arterial 99 94 - 100 %    POCT Oxy Hemoglobin, Arterial 96.8 94.0 - 98.0 %    POCT Hematocrit Calculated, Arterial 36.0 (L) 41.0 - 52.0 %    POCT Sodium, Arterial 137 136 - 145 mmol/L    POCT Potassium, Arterial 5.8 (H) 3.5 - 5.3 mmol/L    POCT Chloride, Arterial 110 (H) 98 - 107 mmol/L    POCT Ionized Calcium, Arterial 1.28 1.10 - 1.33 mmol/L    POCT Glucose, Arterial 89 74 - 99 mg/dL    POCT Lactate, Arterial 0.7 0.4 - 2.0 mmol/L    POCT Base Excess, Arterial -4.3 (L) -2.0 - 3.0 mmol/L    POCT HCO3 Calculated, Arterial 20.1 (L) 22.0 - 26.0 mmol/L    POCT Hemoglobin, Arterial 12.1 (L) 13.5 - 17.5 g/dL    POCT Anion Gap, Arterial 13 10 - 25 mmo/L    Patient Temperature      FiO2 21 %   Troponin, High Sensitivity, 1 Hour   Result Value Ref Range    Troponin I, High Sensitivity 15 0 - 20 ng/L      Scheduled medications  citalopram, 10 mg, oral, Daily  insulin lispro, 0-10 Units, subcutaneous, Before meals & nightly  levothyroxine, 75 mcg, oral, Daily  memantine, 10 mg, oral, BID  pramipexole, 0.125 mg, oral, Daily  pravastatin, 10 mg, oral, Daily      Continuous  medications  sodium bicarbonate, 100 mL/hr, Last Rate: 100 mL/hr (03/11/24 4605)      PRN medications  PRN medications: dextrose 10 % in water (D10W), dextrose, glucagon, hydrALAZINE       ECG 12 lead    Result Date: 3/11/2024  Electronic ventricular pacemaker When compared with ECG of 11-MAR-2024 12:29, (unconfirmed) No significant change was found    ECG 12 lead    Result Date: 3/11/2024  Electronic ventricular pacemaker When compared with ECG of 06-OCT-2022 23:43, Previous ECG has undetermined rhythm, needs review    XR chest 1 view    Result Date: 3/11/2024  Interpreted By:  Chava Pina, STUDY: XR CHEST 1 VIEW   INDICATION: Signs/Symptoms:Chest Pain.   COMPARISON: October 7, 2022   ACCESSION NUMBER(S): [id number]   ORDERING CLINICIAN: SRIKANTH VASQUEZ   FINDINGS: Cardiomegaly with pacemaker unchanged.   No consolidation, effusion, edema, or pneumothorax. Remote right rib fracture.       No evidence of acute intrathoracic abnormality.   Signed by: Chava Pina 3/11/2024 1:27 PM Dictation workstation:   [id number]        Assessment/Plan   Principal Problem:    WINSTON (acute kidney injury) (CMS/[facility])  Hyperkalemia  History of Parkinson with dementia  Hypertension  Hyperlipidemia  History of CAD you on pacemaker    -Blood pressure stable in the emergency room  -EKG showed paced rhythm hard to interpret for hyperkalemia  -Patient received sodium bicarb 100 cc/h for 1 L for now and Lokelma 10 mg once in the ED per nephrology recommendation, will repeat labs this evening, Yanez catheter replaced  -Telemetry  -Regular diet, encourage oral hydration  -Strict I's and O's  -Will obtain renal ultrasound and UA and urine lites  -Avoid nephrotoxic medication  -Blood glucose this morning 80s, will continue with sliding scale insulin and will hold on home insulin to avoid hypoglycemia with poor oral intake, will adjust accordingly.  -Home medication reviewed and resumed  -CODE STATUS full code confirmed with POA on admission  -DVT  prophylaxis with heparin subcu  -GI prophylaxis not indicated         I spent 60 minutes in the professional and overall care of this patient.    SIGNATURE: Rohit Fontaine MD PATIENT NAME: Rogerio Miller   DATE: March 11, 2024 MRN: [id number]   TIME: 3:15 PM

## 2024-03-11 NOTE — ED PROVIDER NOTES
EMERGENCY DEPARTMENT ENCOUNTER      Pt Name: Rogerio Miller  MRN: [id number]  Birthdate 1935  Date of evaluation: 3/11/2024    HISTORY OF PRESENT ILLNESS    Rogerio Miller is an 88 y.o. male with history including Parkinson's with dementia, type 2 diabetes, hypertension, hypothyroidism, hyperlipidemia, CAD status post CABG with pacemaker presenting to the emergency department for abnormal labs.  Patient had lab work done at this facility 2 days ago and notified family today that his lab values were off.  Patient's creatinine was 6.15 BUN 78 and potassium 6.1.  Daughter notes that patient has been more tired of late.  For the last 3 days he has had decreased p.o. intake for food or liquids.  She was able to get him to eat dinner last night.  He has no main complaints.    Limitations to History: Dementia  Additional History Obtained from: Daughter    PAST MEDICAL HISTORY     Past Medical History:   Diagnosis Date    Old myocardial infarction 06/28/2016    History of myocardial infarction    Personal history of other diseases of the circulatory system     Personal history of cardiac murmur    Personal history of other endocrine, nutritional and metabolic disease     Personal history of hyperthyroidism       SURGICAL HISTORY       Past Surgical History:   Procedure Laterality Date    BACK SURGERY  11/03/2014    Back Surgery    CARDIAC PACEMAKER PLACEMENT  08/07/2017    Pacemaker Placement    CORONARY ARTERY BYPASS GRAFT  06/28/2016    CABG    OTHER SURGICAL HISTORY  11/30/2021    Laminectomy    OTHER SURGICAL HISTORY  11/30/2021    Skin debridement       CURRENT MEDICATIONS       Previous Medications    No medications on file       ALLERGIES     Patient has no known allergies.    FAMILY HISTORY     No family history on file.     SOCIAL HISTORY       Social History     Socioeconomic History    Marital status:      Spouse name: None    Number of children: None    Years of education: None    Highest education level:  None   Occupational History    None   Tobacco Use    Smoking status: None    Smokeless tobacco: None   Substance and Sexual Activity    Alcohol use: None    Drug use: None    Sexual activity: None   Other Topics Concern    None   Social History Narrative    None     Social Determinants of Health     Financial Resource Strain: Not on file   Food Insecurity: Not on file   Transportation Needs: Not on file   Physical Activity: Not on file   Stress: Not on file   Social Connections: Not on file   Intimate Partner Violence: Not on file   Housing Stability: Not on file       PHYSICAL EXAM       ED Triage Vitals [03/11/24 1143]   Temperature Heart Rate Respirations BP   36.9 °C (98.4 °F) 81 18 (!) 196/84      Pulse Ox Temp Source Heart Rate Source Patient Position   99 % Temporal Monitor Sitting      BP Location FiO2 (%)     Right arm --       Physical Exam  Vitals and nursing note reviewed.   Constitutional:       General: He is not in acute distress.     Appearance: He is well-developed.   HENT:      Head: Normocephalic and atraumatic.   Eyes:      Conjunctiva/sclera: Conjunctivae normal.   Cardiovascular:      Rate and Rhythm: Normal rate. Rhythm irregular.      Heart sounds: No murmur heard.  Pulmonary:      Effort: Pulmonary effort is normal. No respiratory distress.      Breath sounds: Normal breath sounds.   Abdominal:      Palpations: Abdomen is soft.      Tenderness: There is no abdominal tenderness.   Musculoskeletal:         General: No swelling.      Cervical back: Neck supple.   Skin:     General: Skin is warm and dry.      Capillary Refill: Capillary refill takes less than 2 seconds.   Neurological:      General: No focal deficit present.      Mental Status: He is alert.      Motor: Weakness present.      Comments: Alert and oriented to self and place   Psychiatric:         Mood and Affect: Mood normal.          DIAGNOSTIC RESULTS     LABS:  Labs Reviewed - No data to display    All other labs were within  normal range or not returned as of this dictation.    Imaging  No orders to display        Procedures  Procedures     EMERGENCY DEPARTMENT COURSE/MDM:   Medical Decision Making  Rogerio Miller is an 88 y.o. male with history including Parkinson's with dementia, type 2 diabetes, hypertension, hypothyroidism, hyperlipidemia, CAD status post CABG with pacemaker presenting to the emergency department for abnormal labs.Patient's labs were reviewed from 2 days ago.  Concerning for elevated creatinine BUN and potassium.  Stat ABG ordered to confirm patient's potassium level.  EKG is a paced rhythm has no changes from previous EKGs.    ABG reviewed shows a potassium of 5.8 with a pH of 7.38.  Patient is hemodynamically stable.  Waiting confirmation with [facility].    Labs reviewed.  Patient's labs showed elevated potassium of 6.4 which is mildly hemolyzed.  Creatinine is elevated at 6.66 baseline at 1.26.  BUN is elevated at 76 baseline 21.  Patient has no known history of chronic kidney disease.  Due to these acute changes and acute kidney injury nephrology consulted.  Discussed case with nephrologist Dr. Benoit.  She will follow-up with patient but recommend starting him on a sodium bicarb drip of 100 mL/h for 1 L and Lokelma 10 mg.  These medications were ordered for him.    Patient's bladder ultrasound showed 500 and his bladder with no urgency to go to the bathroom.  Due to this Yanez was placed for acute obstruction.  Urinalysis was sent for evaluation shows blood and glucose but no evidence of bacteria, leukocyte Estrace, or nitrate.  Blood is most likely secondary to Yanez placement.  Patient admitted to hospitalist with nephrology on consult.        Diagnoses as of 03/11/24 2321   WINSTON (acute kidney injury) (CMS/[facility])        External records reviewed: recent inpatient, clinic, and prior ED notes  Labs and Diagnostic imaging independently reviewed/interpreted by me.    Patient plan, care, lab results and imaging were all  discussed with attending.    ED Medications administered this visit:  Medications - No data to display  New Prescriptions from this visit:    New Prescriptions    No medications on file       (Please note that portions of this note were completed with a voice recognition program.  Efforts were made to edit the dictations but occasionally words are mis-transcribed.)     Kimberly Dobbins DO  Resident  03/11/24 0815

## 2024-03-11 NOTE — CONSULTS
INPATIENT NEPHROLOGY CONSULT NOTE        CONSULT: Nephrology SERVICE    PATIENT NAME: Rogerio Miller    MRN: [id number]  DATE of SERVICE: March 11, 2024  TIME of SERVICE: 3:08 PM      REASON FOR CONSULT:  WINSTON, hyperkalemia   REQUESTING PHYSICIAN: Dr. Dobbins  PRIMARY CARE PHYSICIAN: Daniel Flowers MD    HPI:  Mr. Miller is a 88 y.o. male who presents for eval of WINSTON detected on outpatient labs.     Patient with past medical history significant for chronic kidney disease stage IIIa baseline serum creatinine 1.2 to 1.3 mg/L with EGFR of 50-55 mill per minute per 1.73 m² as of last year,  Longstanding history of diabetes mellitus, hypertension, hyperlipidemia, coronary artery disease, atrial fibrillation status post pacemaker for sick sinus syndrome, hypothyroidism.    Patient sent to [facility] from [facility] for further evaluation of elevated serum creatinine and BUN detected on outpatient labs.  Patient daughter at bedside and reports poor oral intake for 2 to 3 days prior to admission.     Labs on presentation noted for acute kidney injury with creatinine up to 6.6 mg deciliter BUN of 76 and drop in GFR to 70 mill per minute per 1.73 m², hyperkalemia potassium 6.4 with mild hemolysis repeat potassium on an ABG still elevated at 5.8.  Metabolic acidosis with bicarb of 20 pH of 7.38 with component of respiratory alkalosis.  Hemoglobin 12.4 mg/dL close to patient baseline.  Troponin within range.     Chest x-ray with no signs of pulmonary edema or pleural effusions.  Remote right rib fracture.    ASSESSMENT AND PLAN:  WINSTON on CKD IIIa: Multifactorial concern for hemodynamically mediated WINSTON due to poor oral intake, suspected underlying urine tract infection; to rule out bladder outlet obstruction.    -- Baseline serum creatinine 1.2 to 1.3 mg deciliter EGFR in the 50s  --Admission serum creatinine 6.6 mg deciliter with EGFR of 70 mill per  minute per 1.73 m²  --Producing urine however urine output reduced over the past few days  --To check urinalysis, urine culture  --To check bladder scan    Volume status:  Hypovolemia, to start iv sodium bicarb drip    Hyperkalemia: To add Lokelma 10 g  -- IV sodium bicarbonate infusion will help with cellular shift    Metabolic acidosis: Concerning for uremic acidemia with respiratory compensation.    Hypertension: Blood pressure 150/70 with a heart rate of 68  And hydralazine for systolic blood pressure more than 160.    Associated conditions:  Atrial fibrillation, sick sinus syndrome with pacemaker in place, diabetes mellitus.      Plan:  Severe acute kidney injury to check bladder scan to rule out bladder outlet obstruction.  To place Yanez catheter for postvoid residual more than 300 ml  To check urinalysis, urine lytes, protein to creatinine ratio  To check uric acid, CK level  To check urine culture     To start IV sodium bicarbonate infusion at 100 cc/h for 1 L.  To Start Lokelma 10 g p.o. x 1.  If patient unable to have bowel movement after 2 hours of Lokelma please give 1 dose of lactulose.  Strict input and output guide fluid management  Medication reviewed renally dosed  To continue renal diet, 2 g sodium.  No urgent indication for renal placement therapy. Pt not interested in long term dialysis  We will continue to monitor closely with you, thank you!     I sincerely, thank you Dr. Dobbins for this opportunity to participate in the care of your patient, I will follow from Nephrology point view!       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/28/2016    History of myocardial infarction    Personal history of other diseases of the circulatory system     Personal history of cardiac murmur    Personal history of other endocrine, nutritional and metabolic disease     Personal history of hyperthyroidism       PAST SURGICAL HISTORY:    Past Surgical History:    Procedure Laterality Date    BACK SURGERY  11/03/2014    Back Surgery    CARDIAC PACEMAKER PLACEMENT  08/07/2017    Pacemaker Placement    CORONARY ARTERY BYPASS GRAFT  06/28/2016    CABG    OTHER SURGICAL HISTORY  11/30/2021    Laminectomy    OTHER SURGICAL HISTORY  11/30/2021    Skin debridement       FAMILY HISTORY:  No family history on file.    SOCIAL HISTORY:       MEDICATIONS:  Prior to Admission Medications:  (Not in a hospital admission)      INPATIENT MEDICATIONS:    Current Facility-Administered Medications:     hydrALAZINE (Apresoline) injection 10 mg, 10 mg, intravenous, q6h PRN, Nathalia Benoit MD    sodium bicarbonate 150 mEq in dextrose 5 % in water (D5W) 1,000 mL infusion, 100 mL/hr, intravenous, Continuous, Kimberly Dobbins DO, Last Rate: 100 mL/hr at 03/11/24 1438, 100 mL/hr at 03/11/24 1438    Current Outpatient Medications:     acetaminophen (Tylenol) 325 mg tablet, Take 2 tablets (650 mg) by mouth every 4 hours if needed for mild pain (1 - 3) or fever (temp greater than 38.0 C)., Disp: , Rfl:     cholecalciferol (Vitamin D-3) 25 MCG (1000 UT) capsule, Take 1 capsule (25 mcg) by mouth once daily., Disp: , Rfl:     citalopram (CeleXA) 10 mg tablet, Take 1 tablet (10 mg) by mouth once daily., Disp: , Rfl:     dextromethorphan-guaifenesin (Robitussin DM)  mg/5 mL oral liquid, Take 15 mL by mouth every 4 hours if needed for cough., Disp: , Rfl:     docusate sodium (Colace) 100 mg capsule, Take 1 capsule (100 mg) by mouth 2 times a day as needed for constipation., Disp: , Rfl:     empagliflozin (Jardiance) 25 mg, Take 1 tablet (25 mg) by mouth once daily., Disp: , Rfl:     insulin glargine (Lantus U-100 Insulin) 100 unit/mL injection, Inject 5 Units under the skin once daily at bedtime. Take as directed per insulin instructions., Disp: , Rfl:     insulin glargine (Lantus U-100 Insulin) 100 unit/mL injection, Inject 25 Units under the skin once daily in the morning. Take as directed per  insulin instructions., Disp: , Rfl:     insulin lispro (HumaLOG KwikPen Insulin) 100 unit/mL injection, Inject under the skin 3 times a day before meals. Sliding Scale: 200-250= 2 units 251-300= 4 units 301-350= 6 units 351-400= 8 units 401-450= 10 units  451-500= 12 units, Disp: , Rfl:     levothyroxine (Synthroid, Levoxyl) 75 mcg tablet, Take 1 tablet (75 mcg) by mouth once daily., Disp: , Rfl:     melatonin 5 mg tablet, Take 1 tablet (5 mg) by mouth once daily at bedtime., Disp: , Rfl:     memantine (Namenda) 10 mg tablet, Take 1 tablet (10 mg) by mouth 2 times a day., Disp: , Rfl:     metFORMIN (Glucophage) 500 mg tablet, Take 1 tablet (500 mg) by mouth 2 times a day., Disp: , Rfl:     multivitamin tablet, Take 1 tablet by mouth once daily., Disp: , Rfl:     omega 3-dha-epa-fish oil (Fish OiL) 1,000 mg (120 mg-180 mg) capsule, Take 1 capsule (1,000 mg) by mouth once daily., Disp: , Rfl:     pramipexole (Mirapex) 0.125 mg tablet, Take 1 tablet (0.125 mg) by mouth once daily., Disp: , Rfl:     pravastatin (Pravachol) 10 mg tablet, Take 1 tablet (10 mg) by mouth once daily., Disp: , Rfl:     ALLERGIES:  Allergies   Allergen Reactions    Aspirin Unknown    Levofloxacin Unknown    Sulfa (Sulfonamide Antibiotics) Unknown       COMPLETE REVIEW OF SYSTEMS:    A comprehensive 14 point review of systems was obtained.  Constitutional: weakness and poor oral intake   HENT: Negative for congestion and sore throat.    Eyes: Negative for pain and visual disturbance.  Respiratory: Negative for cough and shortness of breath.    Cardiovascular: Negative for chest pain and palpitations.  Gastrointestinal: Intermittent abdominal discomfort  Genitourinary: Positive for dysuria, reduced urine output  Musculoskeletal: Positive for back pain and myalgias.  Skin: negative for wound. Negative for color change and rash.  Neurological: Negative for weakness and headaches.  Hematological: Negative for adenopathy. Does not bruise/bleed  "easily.  Psychiatric/Behavioral: Negative for agitation and confusion.  All other reviewed and negative other than HPI.    PHYSICAL EXAM: Physical exam performed.  Patient Vitals for the past 24 hrs:   BP Temp Temp src Pulse Resp SpO2 Height Weight   03/11/24 1430 139/77 -- -- 68 15 100 % -- --   03/11/24 1300 153/71 -- -- 68 17 100 % -- --   03/11/24 1204 173/77 -- -- 71 20 100 % -- --   03/11/24 1143 (!) 196/84 36.9 °C (98.4 °F) Temporal 81 18 99 % 1.778 m (5' 10\") 90.7 kg (200 lb)     Body mass index is 28.7 kg/m².    CONSTITUTIONAL:  Vital signs reviewed.  Elevated blood pressure on admission  GENERAL: Well developed, well nourished.  SKIN: No petechia or ecchymosis  HEAD/SINUSES: Atraumatic  EYES: PERRLA, + pallor  OROPHARYNX: Dry mucous membranes  NECK: no  jugulovenous distention, No carotid bruits, Carotid pulse normal contour, Supple  LUNGS: Diminished air entry bilaterally, no Rales  CARDIAC: Regular distant S1 and S2; + murmurs, or gallops  ABDOMEN: Abdomen soft, non-tender, BS normal, mild suprapubic tenderness  EXTREMITIES: Good capillary refill, no edema.  NEUROLOGIC: Awake, alert and oriented ×3, No focal deficit  HEMATOLOGIC/Lymphatic/Immunologic: No abnormal bleeding, echymosis, inflammation. No cervical or supraclavicular lymphadenopathy.      Intake/Output last 3 shifts:  No intake/output data recorded.    Diagnostic tests reviewed for today's visit:    CBC, Coags, RNP, Mg, Phos   Results from last 7 days   Lab Units 03/11/24  1234   WBC AUTO x10*3/uL 10.4   RBC AUTO x10*6/uL 4.13*   HEMOGLOBIN g/dL 12.4*   HEMATOCRIT % 39.8*     Results from last 7 days   Lab Units 03/11/24  1234   SODIUM mmol/L 139   POTASSIUM mmol/L 6.4*   CHLORIDE mmol/L 107   CO2 mmol/L 22   BUN mg/dL 76*   CREATININE mg/dL 6.66*   CALCIUM mg/dL 9.8   MAGNESIUM mg/dL 2.33   BILIRUBIN TOTAL mg/dL 0.5   ALT U/L 10   AST U/L 23         IMAGING: CXR reviewed in [facility] images.      SIGNATURE: Nathalia Benoit MD  Nephrology and " "Hypertension  [address] [facility] [place]  Office phone: [phone]  FAX: [phone]      This note was partially created using voice recognition software and is inherently subject to errors including those of syntax and \"sound-alike\" substitutions which may escape proofreading.  In such instances, original meaning may be extrapolated by contextual derivation.    "

## 2024-03-12 LAB
ANION GAP SERPL CALC-SCNC: 12 MMOL/L (ref 10–20)
BUN SERPL-MCNC: 71 MG/DL (ref 6–23)
CALCIUM SERPL-MCNC: 8.5 MG/DL (ref 8.6–10.3)
CHLORIDE SERPL-SCNC: 100 MMOL/L (ref 98–107)
CO2 SERPL-SCNC: 28 MMOL/L (ref 21–32)
CREAT SERPL-MCNC: 6.01 MG/DL (ref 0.5–1.3)
EGFRCR SERPLBLD CKD-EPI 2021: 8 ML/MIN/1.73M*2
ERYTHROCYTE [DISTWIDTH] IN BLOOD BY AUTOMATED COUNT: 12.9 % (ref 11.5–14.5)
GLUCOSE BLD MANUAL STRIP-MCNC: 148 MG/DL (ref 74–99)
GLUCOSE BLD MANUAL STRIP-MCNC: 169 MG/DL (ref 74–99)
GLUCOSE BLD MANUAL STRIP-MCNC: 321 MG/DL (ref 74–99)
GLUCOSE BLD MANUAL STRIP-MCNC: 97 MG/DL (ref 74–99)
GLUCOSE SERPL-MCNC: 186 MG/DL (ref 74–99)
HCT VFR BLD AUTO: 35.4 % (ref 41–52)
HGB BLD-MCNC: 11.1 G/DL (ref 13.5–17.5)
HOLD SPECIMEN: NORMAL
MAGNESIUM SERPL-MCNC: 1.95 MG/DL (ref 1.6–2.4)
MCH RBC QN AUTO: 29.8 PG (ref 26–34)
MCHC RBC AUTO-ENTMCNC: 31.4 G/DL (ref 32–36)
MCV RBC AUTO: 95 FL (ref 80–100)
NRBC BLD-RTO: 0 /100 WBCS (ref 0–0)
PLATELET # BLD AUTO: 221 X10*3/UL (ref 150–450)
POTASSIUM SERPL-SCNC: 5 MMOL/L (ref 3.5–5.3)
RBC # BLD AUTO: 3.73 X10*6/UL (ref 4.5–5.9)
SODIUM SERPL-SCNC: 135 MMOL/L (ref 136–145)
URATE SERPL-MCNC: 7.3 MG/DL (ref 4–7.5)
WBC # BLD AUTO: 10.3 X10*3/UL (ref 4.4–11.3)

## 2024-03-12 PROCEDURE — 2500000004 HC RX 250 GENERAL PHARMACY W/ HCPCS (ALT 636 FOR OP/ED): Performed by: INTERNAL MEDICINE

## 2024-03-12 PROCEDURE — 2500000005 HC RX 250 GENERAL PHARMACY W/O HCPCS: Performed by: INTERNAL MEDICINE

## 2024-03-12 PROCEDURE — 82947 ASSAY GLUCOSE BLOOD QUANT: CPT

## 2024-03-12 PROCEDURE — 2500000002 HC RX 250 W HCPCS SELF ADMINISTERED DRUGS (ALT 637 FOR MEDICARE OP, ALT 636 FOR OP/ED): Performed by: INTERNAL MEDICINE

## 2024-03-12 PROCEDURE — 80048 BASIC METABOLIC PNL TOTAL CA: CPT | Performed by: INTERNAL MEDICINE

## 2024-03-12 PROCEDURE — 83735 ASSAY OF MAGNESIUM: CPT | Performed by: INTERNAL MEDICINE

## 2024-03-12 PROCEDURE — 84550 ASSAY OF BLOOD/URIC ACID: CPT | Performed by: INTERNAL MEDICINE

## 2024-03-12 PROCEDURE — 36415 COLL VENOUS BLD VENIPUNCTURE: CPT | Performed by: INTERNAL MEDICINE

## 2024-03-12 PROCEDURE — 85027 COMPLETE CBC AUTOMATED: CPT | Performed by: INTERNAL MEDICINE

## 2024-03-12 PROCEDURE — 2500000001 HC RX 250 WO HCPCS SELF ADMINISTERED DRUGS (ALT 637 FOR MEDICARE OP): Performed by: INTERNAL MEDICINE

## 2024-03-12 PROCEDURE — 2060000001 HC INTERMEDIATE ICU ROOM DAILY

## 2024-03-12 PROCEDURE — 99232 SBSQ HOSP IP/OBS MODERATE 35: CPT | Performed by: INTERNAL MEDICINE

## 2024-03-12 RX ADMIN — MEMANTINE 10 MG: 10 TABLET ORAL at 08:35

## 2024-03-12 RX ADMIN — SODIUM BICARBONATE 100 ML/HR: 84 INJECTION INTRAVENOUS at 09:29

## 2024-03-12 RX ADMIN — CITALOPRAM HYDROBROMIDE 10 MG: 10 TABLET ORAL at 08:35

## 2024-03-12 RX ADMIN — PRAMIPEXOLE DIHYDROCHLORIDE 0.12 MG: 0.25 TABLET ORAL at 08:36

## 2024-03-12 RX ADMIN — LEVOTHYROXINE SODIUM 75 MCG: 75 TABLET ORAL at 06:07

## 2024-03-12 RX ADMIN — Medication 5 MG: at 21:18

## 2024-03-12 RX ADMIN — HEPARIN SODIUM 5000 UNITS: 5000 INJECTION INTRAVENOUS; SUBCUTANEOUS at 15:15

## 2024-03-12 RX ADMIN — POLYETHYLENE GLYCOL 3350 17 G: 17 POWDER, FOR SOLUTION ORAL at 08:36

## 2024-03-12 RX ADMIN — PRAVASTATIN SODIUM 10 MG: 20 TABLET ORAL at 21:17

## 2024-03-12 RX ADMIN — HEPARIN SODIUM 5000 UNITS: 5000 INJECTION INTRAVENOUS; SUBCUTANEOUS at 08:36

## 2024-03-12 RX ADMIN — HEPARIN SODIUM 5000 UNITS: 5000 INJECTION INTRAVENOUS; SUBCUTANEOUS at 23:48

## 2024-03-12 RX ADMIN — INSULIN LISPRO 2 UNITS: 100 INJECTION, SOLUTION INTRAVENOUS; SUBCUTANEOUS at 18:06

## 2024-03-12 RX ADMIN — MEMANTINE 10 MG: 10 TABLET ORAL at 21:17

## 2024-03-12 RX ADMIN — INSULIN LISPRO 8 UNITS: 100 INJECTION, SOLUTION INTRAVENOUS; SUBCUTANEOUS at 12:49

## 2024-03-12 ASSESSMENT — COGNITIVE AND FUNCTIONAL STATUS - GENERAL
PERSONAL GROOMING: A LITTLE
MOVING FROM LYING ON BACK TO SITTING ON SIDE OF FLAT BED WITH BEDRAILS: A LITTLE
DRESSING REGULAR LOWER BODY CLOTHING: A LOT
DAILY ACTIVITIY SCORE: 15
WALKING IN HOSPITAL ROOM: TOTAL
TURNING FROM BACK TO SIDE WHILE IN FLAT BAD: A LITTLE
TOILETING: A LOT
DRESSING REGULAR UPPER BODY CLOTHING: A LITTLE
STANDING UP FROM CHAIR USING ARMS: A LOT
EATING MEALS: A LITTLE
MOVING TO AND FROM BED TO CHAIR: A LITTLE
CLIMB 3 TO 5 STEPS WITH RAILING: TOTAL
MOBILITY SCORE: 13
HELP NEEDED FOR BATHING: A LOT

## 2024-03-12 ASSESSMENT — PAIN SCALES - GENERAL
PAINLEVEL_OUTOF10: 0 - NO PAIN
PAINLEVEL_OUTOF10: 0 - NO PAIN

## 2024-03-12 ASSESSMENT — PAIN - FUNCTIONAL ASSESSMENT
PAIN_FUNCTIONAL_ASSESSMENT: WONG-BAKER FACES
PAIN_FUNCTIONAL_ASSESSMENT: 0-10

## 2024-03-12 NOTE — NURSING NOTE
2000 pt is refusing PO meds. no reason given. tried several attempts with different approaches, will try again at a later time. Hospitalist made aware.     2124 pt off unit with transport to ultrasound.     2208 pt returned from US, pt in agreement to take PO meds, See MAR

## 2024-03-12 NOTE — PROGRESS NOTES
"Rogerio Miller is a 88 y.o. male on day 1 of admission presenting with WINSTON (acute kidney injury) (CMS/[facility]).    Subjective   Seen and examined this morning, alert oriented x 1 to himself, has no complaints.       Objective     Physical Exam  Constitutional: elderly, awake/alert/oriented x1, no distress, alert and cooperative   ENMT: mucous membranes moist   Head/Neck: Neck supple   Respiratory/Thorax: Patent airways, CTAB, on RA   Cardiovascular: Regular, rate and rhythm, no murmurs, normal S 1and S 2   Gastrointestinal: Nondistended, soft, no tenderness today, Yanez catheter in   Musculoskeletal: ROM intact, no joint swelling, normal strength   Extremities: normal extremities, no edema   Neurological: alert and oriented x1, intact senses, motor, response and reflexes, normal strength         Last Recorded Vitals  Blood pressure 114/59, pulse 84, temperature 36.4 °C (97.5 °F), temperature source Temporal, resp. rate 18, height 1.778 m (5' 10\"), weight 90.7 kg (200 lb), SpO2 99 %.  Intake/Output last 3 Shifts:  I/O last 3 completed shifts:  In: 1348.3 (14.9 mL/kg) [P.O.:320; I.V.:1028.3 (11.3 mL/kg)]  Out: 1330 (14.7 mL/kg) [Urine:1330 (0.4 mL/kg/hr)]  Weight: 90.7 kg     Relevant Results  Scheduled medications  citalopram, 10 mg, oral, Daily  heparin (porcine), 5,000 Units, subcutaneous, q8h KAMI  insulin lispro, 0-10 Units, subcutaneous, Before meals & nightly  levothyroxine, 75 mcg, oral, Daily before breakfast  melatonin, 5 mg, oral, Nightly  memantine, 10 mg, oral, BID  polyethylene glycol, 17 g, oral, Daily  pramipexole, 0.125 mg, oral, Daily  pravastatin, 10 mg, oral, Nightly      Continuous medications  sodium bicarbonate, 100 mL/hr, Last Rate: 100 mL/hr (03/12/24 0929)      PRN medications  PRN medications: dextrose 10 % in water (D10W), dextrose, glucagon, hydrALAZINE, scopolamine  Results from last 7 days   Lab Units 03/12/24  0435 03/11/24  1234   WBC AUTO x10*3/uL 10.3 10.4   RBC AUTO x10*6/uL 3.73* 4.13* "   HEMOGLOBIN g/dL 11.1* 12.4*     Results from last 7 days   Lab Units 03/12/24  0436 03/11/24 2020 03/11/24  1622 03/11/24  1234   SODIUM mmol/L  --  139 140  140 139   POTASSIUM mmol/L  --  4.6 5.6*  5.6* 6.4*   CHLORIDE mmol/L  --  105 110*  110* 107   CO2 mmol/L  --  21 21  21 22   BUN mg/dL  --  74* 76*  76* 76*   CREATININE mg/dL  --  6.35* 6.48*  6.48* 6.66*   CALCIUM mg/dL  --  9.2 9.5  9.5 9.8   PHOSPHORUS mg/dL  --   --  4.1  --    MAGNESIUM mg/dL 1.95  --   --  2.33   BILIRUBIN TOTAL mg/dL  --   --   --  0.5   ALT U/L  --   --   --  10   AST U/L  --   --   --  23     ECG 12 lead    Result Date: 3/12/2024  Electronic ventricular pacemaker When compared with ECG of 06-OCT-2022 23:43, Previous ECG has undetermined rhythm, needs review    ECG 12 lead    Result Date: 3/12/2024  Electronic ventricular pacemaker When compared with ECG of 11-MAR-2024 12:29, (unconfirmed) No significant change was found    US renal complete    Result Date: 3/11/2024  Interpreted By:  Troy Eng, STUDY: US RENAL COMPLETE;  3/11/2024 9:52 pm   INDICATION: Signs/Symptoms:WINSTON.   COMPARISON: None.   ACCESSION NUMBER(S): [id number]   ORDERING CLINICIAN: CHASTITY FUENTES   TECHNIQUE: Multiple images of the kidneys were obtained  .   FINDINGS: RIGHT KIDNEY: The right kidney measures 12.8 cm  in length. The renal cortical echogenicity and thickness are within normal limits. Hydronephrosis is identified.. Degree is moderate   LEFT KIDNEY: The left kidney measures 10.7 in length. The renal cortical echogenicity and thickness are within normal limits. No hydronephrosis is present; no evidence of nephrolithiasis.   Decompressed urinary bladder.       Significant right-sided hydronephrosis. This is of unknown significance. Distal obstruction should be considered. Further evaluation is advised with CT imaging.   MACRO: None   Signed by: Troy Eng 3/11/2024 10:29 PM Dictation workstation:   [id number]    XR chest 1  view    Result Date: 3/11/2024  Interpreted By:  Chava Pina, STUDY: XR CHEST 1 VIEW   INDICATION: Signs/Symptoms:Chest Pain.   COMPARISON: October 7, 2022   ACCESSION NUMBER(S): [id number]   ORDERING CLINICIAN: SRIKANTH VASQUEZ   FINDINGS: Cardiomegaly with pacemaker unchanged.   No consolidation, effusion, edema, or pneumothorax. Remote right rib fracture.       No evidence of acute intrathoracic abnormality.   Signed by: Chava Pina 3/11/2024 1:27 PM Dictation workstation:   [id number]       Assessment/Plan   Principal Problem:    WINSTON (acute kidney injury) (CMS/HCC)  Hyperkalemia, resolved  Urinary retention  History of Parkinson with dementia  Hypertension  Hyperlipidemia  History of CAD you on pacemaker     -Blood pressure stable .  -Hypokalemia resolved status post Lokelma  -Kidney function slightly improved to 6.3 from 6.6, currently on sodium bicarb drip 100 cc/h, nephrology following appreciate recommendation  -Urinary retention s/p Yanez catheter placed, bladder scan yesterday with 500 cc  -UA showed protein and glucose, no casts  -EKG showed paced rhythm hard to interpret for hyperkalemia  -Telemetry  -Regular diet, encourage oral hydration  -Strict I's and O's  -History compatible with prerenal/dehydration, reported poor oral intake however Fena compatible with intrinsic  -ordered renal ultrasound .  -Avoid nephrotoxic medication  -Blood glucose this morning 97, will continue with sliding scale insulin and will continue to hold on home insulin to avoid hypoglycemia with poor oral intake, will adjust accordingly.  -Home medication reviewed and resumed  -CODE STATUS full code confirmed with POA on admission  -DVT prophylaxis with heparin subcu  -GI prophylaxis not indicated       I spent 35 minutes in the professional and overall care of this patient.      Rohit Fontaine MD

## 2024-03-12 NOTE — PROGRESS NOTES
Spiritual Care Visit    Clinical Encounter Type  Visited With: Patient  Routine Visit: Introduction  Continue Visiting: Yes    Sabianism Encounters  Sabianism Needs: Prayer, Sabianism articles         Sacramental Encounters  Communion: Patient wants communion  Communion Given Indicator: Yes  Sacrament of Sick-Anointing: Anointed                             Taxonomy  Intended Effects: Establish rapport and connectedness, Yeni affirmation, Convey a calming presence, Demonstrate caring and concern

## 2024-03-12 NOTE — CARE PLAN
The patient's goals for the shift include      The clinical goals for the shift include   Problem: Pain - Adult  Goal: Verbalizes/displays adequate comfort level or baseline comfort level  Outcome: Progressing     Problem: Safety - Adult  Goal: Free from fall injury  Outcome: Progressing     Problem: Discharge Planning  Goal: Discharge to home or other facility with appropriate resources  Outcome: Progressing     Problem: Chronic Conditions and Co-morbidities  Goal: Patient's chronic conditions and co-morbidity symptoms are monitored and maintained or improved  Outcome: Progressing     Problem: Skin  Goal: Decreased wound size/increased tissue granulation at next dressing change  Outcome: Progressing  Goal: Participates in plan/prevention/treatment measures  Outcome: Progressing  Flowsheets (Taken 3/11/2024 2184)  Participates in plan/prevention/treatment measures: Elevate heels  Goal: Prevent/manage excess moisture  Outcome: Progressing  Goal: Prevent/minimize sheer/friction injuries  Outcome: Progressing

## 2024-03-12 NOTE — PROGRESS NOTES
INPATIENT NEPHROLOGY CONSULT PROGRESS NOTE      Patient Name: Rogerio Miller MRN: [id number]  DATE of SERVICE: March 12, 2024  TIME of SERVICE: 8:39 AM  CONSULTING SERVICE: Nephrology    REASON for CONSULT: WINSTON, right sided hydronephrosis     SUBJECTIVE:  Seen and evaluated at bedside.   Patient confused however arousable  Denies any new complaints  Yanez catheter in place draining dark concentrated urine  UOP 1.3 L    SUMMARY OF STAY:  Mr. Miller is a 88 y.o. male who presents for eval of WINSTON detected on outpatient labs.   Found to have urinary retention requiring Yanez catheter placement.   Patient with past medical history significant for chronic kidney disease stage IIIa baseline serum creatinine 1.2 to 1.3 mg/L with EGFR of 50-55 mill per minute per 1.73 m² as of last year,  Longstanding history of diabetes mellitus, hypertension, hyperlipidemia, coronary artery disease, atrial fibrillation status post pacemaker for sick sinus syndrome, hypothyroidism.  Labs on presentation noted for acute kidney injury with creatinine up to 6.6 mg deciliter BUN of 76 and drop in GFR to 70 mill per minute per 1.73 m², hyperkalemia potassium 6.4 with mild hemolysis repeat potassium on an ABG still elevated at 5.8. Metabolic acidosis with bicarb of 20 pH of 7.38 with component of respiratory alkalosis. Hemoglobin 12.4 mg/dL close to patient baseline.     ASSESSMENT:  WINSTON on CKD IIIa: Multifactorial concern for hemodynamically mediated with bladder outlet obstruction, urinary retention requiring Yanez catheter placement.  Renal ultrasound demonstrated right-sided hydronephrosis  -- Baseline serum creatinine 1.2 to 1.3 mg deciliter EGFR in the 50s  --Admission serum creatinine 6.6 mg deciliter with EGFR of 70 mill per minute per 1.73 m²  --Producing urine however urine output reduced over the past few days  -- urine culture pending   -- right sided hydro. Unilateral hydro typically  doesn't increase scr.      Volume status:  Hypovolemia, to cont iv sodium bicarb drip     Hyperkalemia: s/p Lokelma 10 g  -- IV sodium bicarbonate infusion will help with cellular shift     Metabolic acidosis: Concerning for uremic acidemia with respiratory compensation.     Hypertension: Blood pressure 150/70 with a heart rate of 68  and hydralazine for systolic blood pressure more than 160.     Associated conditions:  Atrial fibrillation, sick sinus syndrome with pacemaker in place, diabetes mellitus.       PLAN:  Mild improvement in WINSTON (scr down to 6.3 mg/dl, egfr 8 ml/min) , right-sided hydronephrosis   To continue IV sodium bicarbonate infusion for 1 more liter.  To Repeat renal function panel  To monitor for postobstructive diuresis.    No indication for renal replacement therapy.   Yanez in place   Eventually will benefit from follow-up with urology.   Strict input and output to guide volume management  Medication reviewed, renally dosed    Will follow, thank you!    Medications:    Current Facility-Administered Medications:     citalopram (CeleXA) tablet 10 mg, 10 mg, oral, Daily, Rohit Fontaine MD, 10 mg at 03/12/24 0835    dextrose 10 % in water (D10W) infusion, 0.3 g/kg/hr, intravenous, Once PRN, Rohit Fontaine MD    dextrose 50 % injection 25 g, 25 g, intravenous, q15 min PRN, Rohit Fontaine MD    glucagon (Glucagen) injection 1 mg, 1 mg, intramuscular, q15 min PRN, Rohit Fontaine MD    heparin (porcine) injection 5,000 Units, 5,000 Units, subcutaneous, q8h KAMI, Rohit Fontaine MD, 5,000 Units at 03/12/24 0836    hydrALAZINE (Apresoline) injection 10 mg, 10 mg, intravenous, q6h PRN, Nathalia Benoit MD, 10 mg at 03/11/24 1637    insulin lispro (HumaLOG) injection 0-10 Units, 0-10 Units, subcutaneous, Before meals & nightly, Rohit Fontaine MD    levothyroxine (Synthroid, Levoxyl) tablet 75 mcg, 75 mcg, oral, Daily before breakfast, Rohit Fontaine MD, 75 mcg at 03/12/24  0607    melatonin tablet 5 mg, 5 mg, oral, Nightly, Rohit Fontaine MD, 5 mg at 03/11/24 2208    memantine (Namenda) tablet 10 mg, 10 mg, oral, BID, Rohit Fontaine MD, 10 mg at 03/12/24 0835    polyethylene glycol (Glycolax, Miralax) packet 17 g, 17 g, oral, Daily, Rohit Fontaine MD, 17 g at 03/12/24 0836    pramipexole (Mirapex) tablet 0.125 mg, 0.125 mg, oral, Daily, Rohit Fontaine MD, 0.125 mg at 03/12/24 0836    pravastatin (Pravachol) tablet 10 mg, 10 mg, oral, Nightly, Rohit Fontaine MD, 10 mg at 03/11/24 2208    scopolamine (Transderm-Scop) patch 1 patch, 1 patch, transdermal, q72h PRN, Rohit Fontaine MD    PERTINENT ROS:  GENERAL:  positive for fatigue, poor appetite.  No fever/chills  RESPIRATORY:  positive for shortness of breath.  Negative for cough, wheezing.  CARDIOVASCULAR:   Negative for chest pain or palpitation.  GI:  Negative for abdominal pain, diarrhea, heartburn, nausea, vomiting  : + figueroa     Physical Exam:  Vital signs in last 24 hours:  Temp:  [36 °C (96.8 °F)-36.9 °C (98.4 °F)] 36.5 °C (97.7 °F)  Heart Rate:  [68-97] 76  Resp:  [15-25] 18  BP: (111-196)/(55-88) 111/64    General: Awake, however confused, poor oral intake  HEENT:  NCAT,  mucous membranes moist and pink  NECK: No JVD, no carotid bruit, supple, no cervical mass or thyromegaly  LUNGS;  Diminished breath sounds, no Rales  CV:  Distant, regular rate and rhythm, no murmurs  ABDOMEN:  abdomen soft, nontender, BS normal, no masses or organomegaly  EDEMA: No lower extremity edema/dependent edema  SKIN:  dry and normal turgor, no clubbing, cyanosis or petechia.  No rashes noted  : Figueroa catheter in place    Intake/Output last 3 shifts:  I/O last 3 completed shifts:  In: 1348.3 (14.9 mL/kg) [P.O.:320; I.V.:1028.3 (11.3 mL/kg)]  Out: 1330 (14.7 mL/kg) [Urine:1330 (0.4 mL/kg/hr)]  Weight: 90.7 kg     DATA:  Diagnotic tests reviewed for Todays visit:  Results from last 7 days   Lab Units 03/12/24  8976    WBC AUTO x10*3/uL 10.3   RBC AUTO x10*6/uL 3.73*   HEMOGLOBIN g/dL 11.1*   HEMATOCRIT % 35.4*     Results from last 7 days   Lab Units 03/12/24  0436 03/11/24 2020 03/11/24  1622 03/11/24  1234   SODIUM mmol/L  --  139 140  140 139   POTASSIUM mmol/L  --  4.6 5.6*  5.6* 6.4*   CHLORIDE mmol/L  --  105 110*  110* 107   CO2 mmol/L  --  21 21  21 22   BUN mg/dL  --  74* 76*  76* 76*   CREATININE mg/dL  --  6.35* 6.48*  6.48* 6.66*   CALCIUM mg/dL  --  9.2 9.5  9.5 9.8   PHOSPHORUS mg/dL  --   --  4.1  --    MAGNESIUM mg/dL 1.95  --   --  2.33   BILIRUBIN TOTAL mg/dL  --   --   --  0.5   ALT U/L  --   --   --  10   AST U/L  --   --   --  23     Results from last 7 days   Lab Units 03/11/24  1717   COLOR U  Straw   APPEARANCE U  Clear   PH U  7.0   SPEC GRAV UR  1.010   PROTEIN U mg/dL 100 (2+)*   BLOOD UR  MODERATE (2+)*   NITRITE U  NEGATIVE   WBC UR /HPF 1-5       Us Kidney   INDINGS:  RIGHT KIDNEY:  The right kidney measures 12.8 cm  in length. The renal cortical  echogenicity and thickness are within normal limits. Hydronephrosis  is identified.. Degree is moderate      LEFT KIDNEY:  The left kidney measures 10.7 in length. The renal cortical  echogenicity and thickness are within normal limits. No  hydronephrosis is present; no evidence of nephrolithiasis.      Decompressed urinary bladder.      IMPRESSION:  Significant right-sided hydronephrosis. This is of unknown  significance. Distal obstruction should be considered. Further  evaluation is advised with CT imaging.            CXR  FINDINGS:  Cardiomegaly with pacemaker unchanged.      No consolidation, effusion, edema, or pneumothorax. Remote right rib  fracture.      IMPRESSION:  No evidence of acute intrathoracic abnormality  IMAGING: CXR reviewed in  images      SIGNATURE: Nathalia Benoit MD  Nephrology and Hypertension  [address] [place]  Office phone: [phone]  FAX: [phone]    This note was partially generated using  the Dragon voice recognition system, and there may be some incorrect words, spelling's and punctuation that were not noted in checking the note before saving.

## 2024-03-13 ENCOUNTER — APPOINTMENT (OUTPATIENT)
Dept: RADIOLOGY | Facility: HOSPITAL | Age: 89
DRG: 659 | End: 2024-03-13
Payer: MEDICARE

## 2024-03-13 LAB
APPEARANCE UR: ABNORMAL
BILIRUB UR STRIP.AUTO-MCNC: NEGATIVE MG/DL
COLOR UR: YELLOW
ERYTHROCYTE [DISTWIDTH] IN BLOOD BY AUTOMATED COUNT: 12.9 % (ref 11.5–14.5)
GLUCOSE BLD MANUAL STRIP-MCNC: 104 MG/DL (ref 74–99)
GLUCOSE BLD MANUAL STRIP-MCNC: 125 MG/DL (ref 74–99)
GLUCOSE BLD MANUAL STRIP-MCNC: 128 MG/DL (ref 74–99)
GLUCOSE BLD MANUAL STRIP-MCNC: 130 MG/DL (ref 74–99)
GLUCOSE UR STRIP.AUTO-MCNC: ABNORMAL MG/DL
HCT VFR BLD AUTO: 33.5 % (ref 41–52)
HGB BLD-MCNC: 10.6 G/DL (ref 13.5–17.5)
HOLD SPECIMEN: NORMAL
KETONES UR STRIP.AUTO-MCNC: NEGATIVE MG/DL
LEUKOCYTE ESTERASE UR QL STRIP.AUTO: ABNORMAL
MAGNESIUM SERPL-MCNC: 1.88 MG/DL (ref 1.6–2.4)
MCHC RBC AUTO-ENTMCNC: 31.6 G/DL (ref 32–36)
MCV RBC AUTO: 95 FL (ref 80–100)
NITRITE UR QL STRIP.AUTO: NEGATIVE
NRBC BLD-RTO: 0 /100 WBCS (ref 0–0)
PH UR STRIP.AUTO: 8 [PH]
PLATELET # BLD AUTO: 180 X10*3/UL (ref 150–450)
PROT UR STRIP.AUTO-MCNC: ABNORMAL MG/DL
RBC # BLD AUTO: 3.51 X10*6/UL (ref 4.5–5.9)
RBC # UR STRIP.AUTO: ABNORMAL /UL
RBC #/AREA URNS AUTO: >20 /HPF
SP GR UR STRIP.AUTO: 1.01
UROBILINOGEN UR STRIP.AUTO-MCNC: <2 MG/DL
WBC # BLD AUTO: 9.7 X10*3/UL (ref 4.4–11.3)
WBC #/AREA URNS AUTO: >50 /HPF

## 2024-03-13 PROCEDURE — 36415 COLL VENOUS BLD VENIPUNCTURE: CPT | Performed by: INTERNAL MEDICINE

## 2024-03-13 PROCEDURE — 83735 ASSAY OF MAGNESIUM: CPT | Performed by: INTERNAL MEDICINE

## 2024-03-13 PROCEDURE — 2500000004 HC RX 250 GENERAL PHARMACY W/ HCPCS (ALT 636 FOR OP/ED): Performed by: INTERNAL MEDICINE

## 2024-03-13 PROCEDURE — 2060000001 HC INTERMEDIATE ICU ROOM DAILY

## 2024-03-13 PROCEDURE — 2500000001 HC RX 250 WO HCPCS SELF ADMINISTERED DRUGS (ALT 637 FOR MEDICARE OP): Performed by: INTERNAL MEDICINE

## 2024-03-13 PROCEDURE — 74176 CT ABD & PELVIS W/O CONTRAST: CPT

## 2024-03-13 PROCEDURE — 85027 COMPLETE CBC AUTOMATED: CPT | Performed by: INTERNAL MEDICINE

## 2024-03-13 PROCEDURE — 87086 URINE CULTURE/COLONY COUNT: CPT | Mod: STJLAB | Performed by: INTERNAL MEDICINE

## 2024-03-13 PROCEDURE — 81001 URINALYSIS AUTO W/SCOPE: CPT | Performed by: INTERNAL MEDICINE

## 2024-03-13 PROCEDURE — 2500000002 HC RX 250 W HCPCS SELF ADMINISTERED DRUGS (ALT 637 FOR MEDICARE OP, ALT 636 FOR OP/ED): Performed by: INTERNAL MEDICINE

## 2024-03-13 PROCEDURE — 74176 CT ABD & PELVIS W/O CONTRAST: CPT | Performed by: STUDENT IN AN ORGANIZED HEALTH CARE EDUCATION/TRAINING PROGRAM

## 2024-03-13 PROCEDURE — 99233 SBSQ HOSP IP/OBS HIGH 50: CPT | Performed by: INTERNAL MEDICINE

## 2024-03-13 PROCEDURE — 82947 ASSAY GLUCOSE BLOOD QUANT: CPT

## 2024-03-13 RX ORDER — SODIUM CHLORIDE 9 MG/ML
60 INJECTION, SOLUTION INTRAVENOUS CONTINUOUS
Status: ACTIVE | OUTPATIENT
Start: 2024-03-13 | End: 2024-03-14

## 2024-03-13 RX ORDER — INSULIN GLARGINE 100 [IU]/ML
10 INJECTION, SOLUTION SUBCUTANEOUS NIGHTLY
Status: DISCONTINUED | OUTPATIENT
Start: 2024-03-13 | End: 2024-03-21 | Stop reason: HOSPADM

## 2024-03-13 RX ORDER — HYDROXYZINE HYDROCHLORIDE 25 MG/1
25 TABLET, FILM COATED ORAL ONCE
Status: COMPLETED | OUTPATIENT
Start: 2024-03-13 | End: 2024-03-13

## 2024-03-13 RX ORDER — CEFTRIAXONE 1 G/50ML
1 INJECTION, SOLUTION INTRAVENOUS EVERY 24 HOURS
Status: DISCONTINUED | OUTPATIENT
Start: 2024-03-13 | End: 2024-03-17 | Stop reason: ALTCHOICE

## 2024-03-13 RX ORDER — LORAZEPAM 2 MG/ML
0.5 INJECTION INTRAMUSCULAR ONCE
Status: DISCONTINUED | OUTPATIENT
Start: 2024-03-13 | End: 2024-03-13

## 2024-03-13 RX ADMIN — MEMANTINE 10 MG: 10 TABLET ORAL at 09:41

## 2024-03-13 RX ADMIN — SODIUM CHLORIDE 60 ML/HR: 9 INJECTION, SOLUTION INTRAVENOUS at 12:30

## 2024-03-13 RX ADMIN — CITALOPRAM HYDROBROMIDE 10 MG: 10 TABLET ORAL at 09:41

## 2024-03-13 RX ADMIN — INSULIN GLARGINE 10 UNITS: 100 INJECTION, SOLUTION SUBCUTANEOUS at 21:41

## 2024-03-13 RX ADMIN — MEMANTINE 10 MG: 10 TABLET ORAL at 21:43

## 2024-03-13 RX ADMIN — HYDROXYZINE HYDROCHLORIDE 25 MG: 25 TABLET, FILM COATED ORAL at 13:32

## 2024-03-13 RX ADMIN — HEPARIN SODIUM 5000 UNITS: 5000 INJECTION INTRAVENOUS; SUBCUTANEOUS at 09:43

## 2024-03-13 RX ADMIN — HEPARIN SODIUM 5000 UNITS: 5000 INJECTION INTRAVENOUS; SUBCUTANEOUS at 16:22

## 2024-03-13 RX ADMIN — PRAMIPEXOLE DIHYDROCHLORIDE 0.12 MG: 0.25 TABLET ORAL at 09:41

## 2024-03-13 RX ADMIN — CEFTRIAXONE SODIUM 1 G: 1 INJECTION, SOLUTION INTRAVENOUS at 14:44

## 2024-03-13 RX ADMIN — Medication 5 MG: at 21:43

## 2024-03-13 RX ADMIN — PRAVASTATIN SODIUM 10 MG: 20 TABLET ORAL at 21:43

## 2024-03-13 ASSESSMENT — COGNITIVE AND FUNCTIONAL STATUS - GENERAL
DRESSING REGULAR UPPER BODY CLOTHING: A LITTLE
STANDING UP FROM CHAIR USING ARMS: A LOT
HELP NEEDED FOR BATHING: A LOT
DRESSING REGULAR LOWER BODY CLOTHING: A LOT
DAILY ACTIVITIY SCORE: 15
WALKING IN HOSPITAL ROOM: TOTAL
HELP NEEDED FOR BATHING: A LOT
MOBILITY SCORE: 14
MOBILITY SCORE: 14
TOILETING: A LOT
CLIMB 3 TO 5 STEPS WITH RAILING: TOTAL
EATING MEALS: A LITTLE
EATING MEALS: A LITTLE
TOILETING: A LOT
DAILY ACTIVITIY SCORE: 15
TURNING FROM BACK TO SIDE WHILE IN FLAT BAD: A LITTLE
MOVING TO AND FROM BED TO CHAIR: A LITTLE
TURNING FROM BACK TO SIDE WHILE IN FLAT BAD: A LITTLE
WALKING IN HOSPITAL ROOM: TOTAL
STANDING UP FROM CHAIR USING ARMS: A LOT
MOVING TO AND FROM BED TO CHAIR: A LITTLE
CLIMB 3 TO 5 STEPS WITH RAILING: TOTAL
DRESSING REGULAR UPPER BODY CLOTHING: A LITTLE
DRESSING REGULAR LOWER BODY CLOTHING: A LOT
PERSONAL GROOMING: A LITTLE
PERSONAL GROOMING: A LITTLE

## 2024-03-13 ASSESSMENT — PAIN SCALES - GENERAL
PAINLEVEL_OUTOF10: 0 - NO PAIN

## 2024-03-13 ASSESSMENT — PAIN - FUNCTIONAL ASSESSMENT
PAIN_FUNCTIONAL_ASSESSMENT: 0-10
PAIN_FUNCTIONAL_ASSESSMENT: 0-10
PAIN_FUNCTIONAL_ASSESSMENT: WONG-BAKER FACES
PAIN_FUNCTIONAL_ASSESSMENT: 0-10

## 2024-03-13 ASSESSMENT — PAIN SCALES - WONG BAKER: WONGBAKER_NUMERICALRESPONSE: NO HURT

## 2024-03-13 NOTE — PROGRESS NOTES
"Rogerio Miller is a 88 y.o. male on day 2 of admission presenting with WINSTON (acute kidney injury) (CMS/[facility]).    Subjective   Seen and examined this morning, alert oriented x 1 to himself, he was refusing CT scan this morning, no other complaints  Objective     Physical Exam  Constitutional: elderly, awake/alert/oriented x1, no distress, alert and cooperative   ENMT: mucous membranes moist   Head/Neck: Neck supple   Respiratory/Thorax: Patent airways, CTAB, on RA   Cardiovascular: Regular, rate and rhythm, no murmurs, normal S 1and S 2   Gastrointestinal: Nondistended, soft, no tenderness today, Yanez catheter in   Musculoskeletal: ROM intact, no joint swelling, normal strength   Extremities: normal extremities, no edema   Neurological: alert and oriented x1, intact senses, motor, response and reflexes, normal strength         Last Recorded Vitals  Blood pressure 135/67, pulse 62, temperature 36.4 °C (97.5 °F), temperature source Tympanic, resp. rate 18, height 1.778 m (5' 10\"), weight 90.7 kg (200 lb), SpO2 97 %.  Intake/Output last 3 Shifts:  I/O last 3 completed shifts:  In: 2180 (24 mL/kg) [P.O.:200; I.V.:1980 (21.8 mL/kg)]  Out: 1450 (16 mL/kg) [Urine:1450 (0.4 mL/kg/hr)]  Weight: 90.7 kg     Relevant Results  Scheduled medications  cefTRIAXone, 1 g, intravenous, q24h  citalopram, 10 mg, oral, Daily  heparin (porcine), 5,000 Units, subcutaneous, q8h KAMI  hydrOXYzine HCL, 25 mg, oral, Once  insulin lispro, 0-10 Units, subcutaneous, Before meals & nightly  levothyroxine, 75 mcg, oral, Daily before breakfast  melatonin, 5 mg, oral, Nightly  memantine, 10 mg, oral, BID  polyethylene glycol, 17 g, oral, Daily  pramipexole, 0.125 mg, oral, Daily  pravastatin, 10 mg, oral, Nightly      Continuous medications  sodium chloride 0.9%, 60 mL/hr, Last Rate: 60 mL/hr (03/13/24 1230)      PRN medications  PRN medications: dextrose 10 % in water (D10W), dextrose, glucagon, hydrALAZINE, scopolamine  Results from last 7 days   Lab " Units 03/13/24  0638 03/12/24  0435 03/11/24  1234   WBC AUTO x10*3/uL 9.7 10.3 10.4   RBC AUTO x10*6/uL 3.51* 3.73* 4.13*   HEMOGLOBIN g/dL 10.6* 11.1* 12.4*       Results from last 7 days   Lab Units 03/13/24  0638 03/12/24  1818 03/12/24  0436 03/11/24 2020 03/11/24  1622 03/11/24  1234   SODIUM mmol/L  --  135*  --  139 140  140 139   POTASSIUM mmol/L  --  5.0  --  4.6 5.6*  5.6* 6.4*   CHLORIDE mmol/L  --  100  --  105 110*  110* 107   CO2 mmol/L  --  28  --  21 21  21 22   BUN mg/dL  --  71*  --  74* 76*  76* 76*   CREATININE mg/dL  --  6.01*  --  6.35* 6.48*  6.48* 6.66*   CALCIUM mg/dL  --  8.5*  --  9.2 9.5  9.5 9.8   PHOSPHORUS mg/dL  --   --   --   --  4.1  --    MAGNESIUM mg/dL 1.88  --  1.95  --   --  2.33   BILIRUBIN TOTAL mg/dL  --   --   --   --   --  0.5   ALT U/L  --   --   --   --   --  10   AST U/L  --   --   --   --   --  23       ECG 12 lead    Result Date: 3/12/2024  Electronic ventricular pacemaker When compared with ECG of 06-OCT-2022 23:43, Previous ECG has undetermined rhythm, needs review    ECG 12 lead    Result Date: 3/12/2024  Electronic ventricular pacemaker When compared with ECG of 11-MAR-2024 12:29, (unconfirmed) No significant change was found    US renal complete    Result Date: 3/11/2024  Interpreted By:  Troy Eng, STUDY: US RENAL COMPLETE;  3/11/2024 9:52 pm   INDICATION: Signs/Symptoms:WINSTON.   COMPARISON: None.   ACCESSION NUMBER(S): [id number]   ORDERING CLINICIAN: CHASTITY FUENTES   TECHNIQUE: Multiple images of the kidneys were obtained  .   FINDINGS: RIGHT KIDNEY: The right kidney measures 12.8 cm  in length. The renal cortical echogenicity and thickness are within normal limits. Hydronephrosis is identified.. Degree is moderate   LEFT KIDNEY: The left kidney measures 10.7 in length. The renal cortical echogenicity and thickness are within normal limits. No hydronephrosis is present; no evidence of nephrolithiasis.   Decompressed urinary bladder.        Significant right-sided hydronephrosis. This is of unknown significance. Distal obstruction should be considered. Further evaluation is advised with CT imaging.   MACRO: None   Signed by: Troy Eng 3/11/2024 10:29 PM Dictation workstation:   [id number]    XR chest 1 view    Result Date: 3/11/2024  Interpreted By:  Chava Pina, STUDY: XR CHEST 1 VIEW   INDICATION: Signs/Symptoms:Chest Pain.   COMPARISON: October 7, 2022   ACCESSION NUMBER(S): [id number]   ORDERING CLINICIAN: SRIKANTH VASQUEZ   FINDINGS: Cardiomegaly with pacemaker unchanged.   No consolidation, effusion, edema, or pneumothorax. Remote right rib fracture.       No evidence of acute intrathoracic abnormality.   Signed by: Chava Pina 3/11/2024 1:27 PM Dictation workstation:   [id number]       Assessment/Plan   Principal Problem:    WINSTON (acute kidney injury) (CMS/HCC)  Hyperkalemia, resolved  Urinary retention  Right hydronephrosis  History of Parkinson with dementia  Hypertension  Hyperlipidemia  History of CAD you on pacemaker     -Blood pressure stable .  -Hyperkalemia resolved status post Lokelma  -Kidney function slowly improving 6.0 from 6.6 on admission, switch from sodium bicarb drip to normal saline, nephrology following appreciate recommendation  -Renal ultrasound showed right hydronephrosis, ordered CT scan without contrast this morning however patient refused to cooperate and move to the table, spoke to his daughter at the bedside and she will go with him to CT scan to help him being more cooperative, will give Atarax will try to avoid giving Ativan with his age and dementia  -Urinary retention s/p Yanez catheter placed, bladder scan yesterday with 500 cc  -UA showed protein and glucose, no casts, ?? UTI started on ceftriaxone pending cx  -Telemetry  -Regular diet, encourage oral hydration  -Strict I's and O's  -History compatible with prerenal/dehydration, reported poor oral intake however Fena compatible with  intrinsic  -Avoid nephrotoxic medication  -Blood glucose this morning 180s , will continue with sliding scale insulin and will resume lantus, takes 25 units at night will start with 10 units  -CODE STATUS full code confirmed with POA on admission  -DVT prophylaxis with heparin subcu  -GI prophylaxis not indicated       I spent 35 minutes in the professional and overall care of this patient.      Rohit Fontaine MD

## 2024-03-13 NOTE — CARE PLAN
The patient's goals for the shift include    Problem: Pain - Adult  Goal: Verbalizes/displays adequate comfort level or baseline comfort level  Outcome: Progressing     Problem: Safety - Adult  Goal: Free from fall injury  Outcome: Progressing     Problem: Discharge Planning  Goal: Discharge to home or other facility with appropriate resources  Outcome: Progressing     Problem: Chronic Conditions and Co-morbidities  Goal: Patient's chronic conditions and co-morbidity symptoms are monitored and maintained or improved  Outcome: Progressing     Problem: Skin  Goal: Decreased wound size/increased tissue granulation at next dressing change  Outcome: Progressing  Goal: Participates in plan/prevention/treatment measures  Outcome: Progressing  Goal: Prevent/manage excess moisture  Outcome: Progressing  Goal: Prevent/minimize sheer/friction injuries  Outcome: Progressing     Problem: Fall/Injury  Goal: Not fall by end of shift  Outcome: Progressing  Goal: Be free from injury by end of the shift  Outcome: Progressing     Problem: Diabetes  Goal: Achieve decreasing blood glucose levels by end of shift  Outcome: Progressing  Goal: Maintain glucose levels >70mg/dl to <250mg/dl throughout shift  Outcome: Progressing       The clinical goals for the shift include see POC    Over the shift, the patient did not make progress toward the following goals. Barriers to progression include confusion. Recommendations to address these barriers include antibiotics and reorientation.

## 2024-03-13 NOTE — PROGRESS NOTES
INPATIENT NEPHROLOGY CONSULT PROGRESS NOTE      Patient Name: Rogerio Miller MRN: [id number]  DATE of SERVICE: March 13, 2024  TIME of SERVICE: 11:50 AM  CONSULTING SERVICE: Nephrology    REASON for CONSULT: WINSTON, right sided hydronephrosis     SUBJECTIVE:  Patient seen and evaluated at bedside.  Confused however easily arousable.  Serum creatinine improving gradually down to 6 from 6.4 mg deciliter BUN down to 71 from 74.  Potassium down to 5 from 5.6.  EGFR still at 8.  Urine output about 1 L.  Vital signs blood pressure 130/60 with a respiratory rate of 32 and heart rate of 68.      SUMMARY OF STAY:  Mr. Miller is a 88 y.o. male who presents for eval of WINSTON detected on outpatient labs.   Found to have urinary retention requiring Yanez catheter placement.   Patient with past medical history significant for chronic kidney disease stage IIIa baseline serum creatinine 1.2 to 1.3 mg/L with EGFR of 50-55 mill per minute per 1.73 m² as of last year,  Longstanding history of diabetes mellitus, hypertension, hyperlipidemia, coronary artery disease, atrial fibrillation status post pacemaker for sick sinus syndrome, hypothyroidism.  Labs on presentation noted for acute kidney injury with creatinine up to 6.6 mg deciliter BUN of 76 and drop in GFR to 70 mill per minute per 1.73 m², hyperkalemia potassium 6.4 with mild hemolysis repeat potassium on an ABG still elevated at 5.8. Metabolic acidosis with bicarb of 20 pH of 7.38 with component of respiratory alkalosis. Hemoglobin 12.4 mg/dL close to patient baseline.     ASSESSMENT:  WINSTON on CKD IIIa: Multifactorial concern for hemodynamically mediated with bladder outlet obstruction, urinary retention requiring Yanez catheter placement.  Renal ultrasound demonstrated right-sided hydronephrosis  -- Baseline serum creatinine 1.2 to 1.3 mg deciliter EGFR in the 50s  --Admission serum creatinine 6.6 mg deciliter with EGFR of 7 mill per  minute per 1.73 m²  --Producing urine however urine output reduced over the past few days  -- Urinalysis with reflex to culture, culture not performed  -- right sided hydro.      Volume status:  Hypovolemia, to cont iv sodium bicarb drip     Hyperkalemia: s/p Lokelma 10 g  -- s/p IV sodium bicarbonate infusion     Metabolic acidosis: Concerning for uremic acidemia with respiratory compensation.  Improving      Hypertension: Blood pressure 150/70 with a heart rate of 68  and hydralazine for systolic blood pressure more than 160.     Associated conditions:  Atrial fibrillation, sick sinus syndrome with pacemaker in place, diabetes mellitus.       PLAN:  Mild improvement in WINSTON (scr down to 6.0 mg/dl, egfr 8 ml/min) , right-sided hydronephrosis   To Discontinue IV bicarb  To start NS at 60 ml/hr   To continue to monitor for postobstructive diuresis currently urine output still marginal  Urinalysis with +3 leukocyte esterase and more than 20 WBC pending urine cx.  Yanez in place   No indication for renal replacement therapy.   Strict input and output to guide volume management  Medication reviewed, renally dosed    Will follow, thank you!    Medications:    Current Facility-Administered Medications:     citalopram (CeleXA) tablet 10 mg, 10 mg, oral, Daily, Rohit Fontaine MD, 10 mg at 03/13/24 0941    dextrose 10 % in water (D10W) infusion, 0.3 g/kg/hr, intravenous, Once PRN, Rohit Fontaine MD    dextrose 50 % injection 25 g, 25 g, intravenous, q15 min PRN, Rohit Fontaine MD    glucagon (Glucagen) injection 1 mg, 1 mg, intramuscular, q15 min PRN, Rohit Fontaine MD    heparin (porcine) injection 5,000 Units, 5,000 Units, subcutaneous, q8h KAMI, Rohit Fontaine MD, 5,000 Units at 03/13/24 0943    hydrALAZINE (Apresoline) injection 10 mg, 10 mg, intravenous, q6h PRN, Nathalia Benoit MD, 10 mg at 03/11/24 1637    insulin lispro (HumaLOG) injection 0-10 Units, 0-10 Units, subcutaneous, Before meals &  nightly, Rohit Fontaine MD, 2 Units at 03/12/24 1806    levothyroxine (Synthroid, Levoxyl) tablet 75 mcg, 75 mcg, oral, Daily before breakfast, Rohit Fontaine MD, 75 mcg at 03/12/24 0607    melatonin tablet 5 mg, 5 mg, oral, Nightly, Rohit Fontaine MD, 5 mg at 03/12/24 2118    memantine (Namenda) tablet 10 mg, 10 mg, oral, BID, Rohit Fontaine MD, 10 mg at 03/13/24 0941    polyethylene glycol (Glycolax, Miralax) packet 17 g, 17 g, oral, Daily, Rohit Fontaine MD, 17 g at 03/12/24 0836    pramipexole (Mirapex) tablet 0.125 mg, 0.125 mg, oral, Daily, Rohit Fontaine MD, 0.125 mg at 03/13/24 0941    pravastatin (Pravachol) tablet 10 mg, 10 mg, oral, Nightly, Rohit Fontaine MD, 10 mg at 03/12/24 2117    scopolamine (Transderm-Scop) patch 1 patch, 1 patch, transdermal, q72h PRN, Rohit Fontaine MD    PERTINENT ROS:  GENERAL:  positive for fatigue, poor appetite.  No fever/chills  RESPIRATORY:  positive for shortness of breath.  Negative for cough, wheezing.  CARDIOVASCULAR:   Negative for chest pain or palpitation.  GI:  Negative for abdominal pain, diarrhea, heartburn, nausea, vomiting  : + figueroa     Physical Exam:  Vital signs in last 24 hours:  Temp:  [36 °C (96.8 °F)-36.5 °C (97.7 °F)] 36.4 °C (97.5 °F)  Heart Rate:  [68-81] 68  Resp:  [18-32] 32  BP: (113-148)/(56-72) 133/69    General: Awake, confused, poor oral intake  HEENT:  NCAT,  mucous membranes moist and pink  NECK: No JVD, no carotid bruit, supple, no cervical mass or thyromegaly  LUNGS;  Diminished breath sounds, no Rales  CV:  Distant, regular rate and rhythm, no murmurs  ABDOMEN:  abdomen soft, nontender, BS normal, no masses or organomegaly  EDEMA: No lower extremity edema/dependent edema  SKIN:  dry and normal turgor, no clubbing, cyanosis or petechia.  No rashes noted  : Figueroa catheter in place    Intake/Output last 3 shifts:  I/O last 3 completed shifts:  In: 2180 (24 mL/kg) [P.O.:200; I.V.:1980 (21.8  mL/kg)]  Out: 1450 (16 mL/kg) [Urine:1450 (0.4 mL/kg/hr)]  Weight: 90.7 kg     DATA:  Diagnotic tests reviewed for Todays visit:  Results from last 7 days   Lab Units 03/13/24  0638   WBC AUTO x10*3/uL 9.7   RBC AUTO x10*6/uL 3.51*   HEMOGLOBIN g/dL 10.6*   HEMATOCRIT % 33.5*       Results from last 7 days   Lab Units 03/13/24  0638 03/12/24  1818 03/11/24  2020 03/11/24  1622 03/11/24  1234   SODIUM mmol/L  --  135*   < > 140  140 139   POTASSIUM mmol/L  --  5.0   < > 5.6*  5.6* 6.4*   CHLORIDE mmol/L  --  100   < > 110*  110* 107   CO2 mmol/L  --  28   < > 21  21 22   BUN mg/dL  --  71*   < > 76*  76* 76*   CREATININE mg/dL  --  6.01*   < > 6.48*  6.48* 6.66*   CALCIUM mg/dL  --  8.5*   < > 9.5  9.5 9.8   PHOSPHORUS mg/dL  --   --   --  4.1  --    MAGNESIUM mg/dL 1.88  --    < >  --  2.33   BILIRUBIN TOTAL mg/dL  --   --   --   --  0.5   ALT U/L  --   --   --   --  10   AST U/L  --   --   --   --  23    < > = values in this interval not displayed.       Results from last 7 days   Lab Units 03/13/24  0245   COLOR U  Yellow   APPEARANCE U  Hazy*   PH U  8.0   SPEC GRAV UR  1.010   PROTEIN U mg/dL 30 (1+)*   BLOOD UR  MODERATE (2+)*   NITRITE U  NEGATIVE   WBC UR /HPF >50*         Us Kidney   INDINGS:  RIGHT KIDNEY:  The right kidney measures 12.8 cm  in length. The renal cortical  echogenicity and thickness are within normal limits. Hydronephrosis  is identified.. Degree is moderate      LEFT KIDNEY:  The left kidney measures 10.7 in length. The renal cortical  echogenicity and thickness are within normal limits. No  hydronephrosis is present; no evidence of nephrolithiasis.      Decompressed urinary bladder.      IMPRESSION:  Significant right-sided hydronephrosis. This is of unknown  significance. Distal obstruction should be considered. Further  evaluation is advised with CT imaging.      CXR  FINDINGS:  Cardiomegaly with pacemaker unchanged.      No consolidation, effusion, edema, or pneumothorax. Remote  right rib  fracture.      IMPRESSION:  No evidence of acute intrathoracic abnormality  IMAGING: CXR reviewed in  images      SIGNATURE: Nathalia Benoit MD  Nephrology and Hypertension  [address] [place]  Office phone: [phone]  FAX: [phone]    This note was partially generated using the Dragon voice recognition system, and there may be some incorrect words, spelling's and punctuation that were not noted in checking the note before saving.

## 2024-03-13 NOTE — CARE PLAN
Problem: Pain - Adult  Goal: Verbalizes/displays adequate comfort level or baseline comfort level  Outcome: Progressing     Problem: Safety - Adult  Goal: Free from fall injury  Outcome: Progressing     Problem: Discharge Planning  Goal: Discharge to home or other facility with appropriate resources  Outcome: Progressing     Problem: Chronic Conditions and Co-morbidities  Goal: Patient's chronic conditions and co-morbidity symptoms are monitored and maintained or improved  Outcome: Progressing     Problem: Skin  Goal: Decreased wound size/increased tissue granulation at next dressing change  Outcome: Progressing  Goal: Participates in plan/prevention/treatment measures  Outcome: Progressing  Goal: Prevent/manage excess moisture  Outcome: Progressing  Goal: Prevent/minimize sheer/friction injuries  Outcome: Progressing     Problem: Fall/Injury  Goal: Not fall by end of shift  Outcome: Progressing  Goal: Be free from injury by end of the shift  Outcome: Progressing     Problem: Diabetes  Goal: Achieve decreasing blood glucose levels by end of shift  Outcome: Progressing  Goal: Maintain glucose levels >70mg/dl to <250mg/dl throughout shift  Outcome: Progressing   The patient's goals for the shift include      The clinical goals for the shift include see plan of care

## 2024-03-14 ENCOUNTER — ANESTHESIA (OUTPATIENT)
Dept: OPERATING ROOM | Facility: HOSPITAL | Age: 89
DRG: 659 | End: 2024-03-14
Payer: MEDICARE

## 2024-03-14 ENCOUNTER — ANESTHESIA EVENT (OUTPATIENT)
Dept: OPERATING ROOM | Facility: HOSPITAL | Age: 89
DRG: 659 | End: 2024-03-14
Payer: MEDICARE

## 2024-03-14 ENCOUNTER — APPOINTMENT (OUTPATIENT)
Dept: RADIOLOGY | Facility: HOSPITAL | Age: 89
DRG: 659 | End: 2024-03-14
Payer: MEDICARE

## 2024-03-14 PROBLEM — N13.39 OTHER HYDRONEPHROSIS: Status: ACTIVE | Noted: 2024-03-11

## 2024-03-14 LAB
ANION GAP SERPL CALC-SCNC: 15 MMOL/L (ref 10–20)
BUN SERPL-MCNC: 65 MG/DL (ref 6–23)
CALCIUM SERPL-MCNC: 8.9 MG/DL (ref 8.6–10.3)
CHLORIDE SERPL-SCNC: 106 MMOL/L (ref 98–107)
CO2 SERPL-SCNC: 24 MMOL/L (ref 21–32)
CREAT SERPL-MCNC: 6.43 MG/DL (ref 0.5–1.3)
EGFRCR SERPLBLD CKD-EPI 2021: 8 ML/MIN/1.73M*2
ERYTHROCYTE [DISTWIDTH] IN BLOOD BY AUTOMATED COUNT: 12.7 % (ref 11.5–14.5)
GLUCOSE BLD MANUAL STRIP-MCNC: 105 MG/DL (ref 74–99)
GLUCOSE BLD MANUAL STRIP-MCNC: 107 MG/DL (ref 74–99)
GLUCOSE BLD MANUAL STRIP-MCNC: 108 MG/DL (ref 74–99)
GLUCOSE BLD MANUAL STRIP-MCNC: 114 MG/DL (ref 74–99)
GLUCOSE BLD MANUAL STRIP-MCNC: 127 MG/DL (ref 74–99)
GLUCOSE SERPL-MCNC: 113 MG/DL (ref 74–99)
HCT VFR BLD AUTO: 35.4 % (ref 41–52)
HGB BLD-MCNC: 11.1 G/DL (ref 13.5–17.5)
MAGNESIUM SERPL-MCNC: 1.93 MG/DL (ref 1.6–2.4)
MCH RBC QN AUTO: 30.1 PG (ref 26–34)
MCHC RBC AUTO-ENTMCNC: 31.4 G/DL (ref 32–36)
MCV RBC AUTO: 96 FL (ref 80–100)
NRBC BLD-RTO: 0 /100 WBCS (ref 0–0)
PLATELET # BLD AUTO: 202 X10*3/UL (ref 150–450)
POTASSIUM SERPL-SCNC: 5 MMOL/L (ref 3.5–5.3)
RBC # BLD AUTO: 3.69 X10*6/UL (ref 4.5–5.9)
SODIUM SERPL-SCNC: 140 MMOL/L (ref 136–145)
WBC # BLD AUTO: 9.9 X10*3/UL (ref 4.4–11.3)

## 2024-03-14 PROCEDURE — 0T788DZ DILATION OF BILATERAL URETERS WITH INTRALUMINAL DEVICE, VIA NATURAL OR ARTIFICIAL OPENING ENDOSCOPIC: ICD-10-PCS | Performed by: UROLOGY

## 2024-03-14 PROCEDURE — 2780000003 HC OR 278 NO HCPCS: Performed by: UROLOGY

## 2024-03-14 PROCEDURE — A52318 PR REMOVE BLADDER STONE,>2.5 CM: Performed by: ANESTHESIOLOGIST ASSISTANT

## 2024-03-14 PROCEDURE — A52318 PR REMOVE BLADDER STONE,>2.5 CM: Performed by: STUDENT IN AN ORGANIZED HEALTH CARE EDUCATION/TRAINING PROGRAM

## 2024-03-14 PROCEDURE — 7100000002 HC RECOVERY ROOM TIME - EACH INCREMENTAL 1 MINUTE: Performed by: UROLOGY

## 2024-03-14 PROCEDURE — 80048 BASIC METABOLIC PNL TOTAL CA: CPT | Performed by: STUDENT IN AN ORGANIZED HEALTH CARE EDUCATION/TRAINING PROGRAM

## 2024-03-14 PROCEDURE — 2500000005 HC RX 250 GENERAL PHARMACY W/O HCPCS: Performed by: ANESTHESIOLOGIST ASSISTANT

## 2024-03-14 PROCEDURE — 7100000001 HC RECOVERY ROOM TIME - INITIAL BASE CHARGE: Performed by: UROLOGY

## 2024-03-14 PROCEDURE — 85027 COMPLETE CBC AUTOMATED: CPT | Performed by: STUDENT IN AN ORGANIZED HEALTH CARE EDUCATION/TRAINING PROGRAM

## 2024-03-14 PROCEDURE — 2500000004 HC RX 250 GENERAL PHARMACY W/ HCPCS (ALT 636 FOR OP/ED): Performed by: INTERNAL MEDICINE

## 2024-03-14 PROCEDURE — 3700000002 HC GENERAL ANESTHESIA TIME - EACH INCREMENTAL 1 MINUTE: Performed by: UROLOGY

## 2024-03-14 PROCEDURE — 2500000001 HC RX 250 WO HCPCS SELF ADMINISTERED DRUGS (ALT 637 FOR MEDICARE OP): Performed by: INTERNAL MEDICINE

## 2024-03-14 PROCEDURE — 2500000001 HC RX 250 WO HCPCS SELF ADMINISTERED DRUGS (ALT 637 FOR MEDICARE OP): Performed by: UROLOGY

## 2024-03-14 PROCEDURE — 3600000003 HC OR TIME - INITIAL BASE CHARGE - PROCEDURE LEVEL THREE: Performed by: UROLOGY

## 2024-03-14 PROCEDURE — 2500000004 HC RX 250 GENERAL PHARMACY W/ HCPCS (ALT 636 FOR OP/ED): Performed by: ANESTHESIOLOGIST ASSISTANT

## 2024-03-14 PROCEDURE — 2500000002 HC RX 250 W HCPCS SELF ADMINISTERED DRUGS (ALT 637 FOR MEDICARE OP, ALT 636 FOR OP/ED): Performed by: UROLOGY

## 2024-03-14 PROCEDURE — BT1D1ZZ FLUOROSCOPY OF RIGHT KIDNEY, URETER AND BLADDER USING LOW OSMOLAR CONTRAST: ICD-10-PCS | Performed by: UROLOGY

## 2024-03-14 PROCEDURE — 2060000001 HC INTERMEDIATE ICU ROOM DAILY

## 2024-03-14 PROCEDURE — 0TCB8ZZ EXTIRPATION OF MATTER FROM BLADDER, VIA NATURAL OR ARTIFICIAL OPENING ENDOSCOPIC: ICD-10-PCS | Performed by: UROLOGY

## 2024-03-14 PROCEDURE — 36415 COLL VENOUS BLD VENIPUNCTURE: CPT | Performed by: STUDENT IN AN ORGANIZED HEALTH CARE EDUCATION/TRAINING PROGRAM

## 2024-03-14 PROCEDURE — 82947 ASSAY GLUCOSE BLOOD QUANT: CPT

## 2024-03-14 PROCEDURE — 99233 SBSQ HOSP IP/OBS HIGH 50: CPT | Performed by: INTERNAL MEDICINE

## 2024-03-14 PROCEDURE — 3600000008 HC OR TIME - EACH INCREMENTAL 1 MINUTE - PROCEDURE LEVEL THREE: Performed by: UROLOGY

## 2024-03-14 PROCEDURE — C1769 GUIDE WIRE: HCPCS | Performed by: UROLOGY

## 2024-03-14 PROCEDURE — 3700000001 HC GENERAL ANESTHESIA TIME - INITIAL BASE CHARGE: Performed by: UROLOGY

## 2024-03-14 PROCEDURE — 2720000007 HC OR 272 NO HCPCS: Performed by: UROLOGY

## 2024-03-14 PROCEDURE — 82365 CALCULUS SPECTROSCOPY: CPT | Performed by: UROLOGY

## 2024-03-14 PROCEDURE — 83735 ASSAY OF MAGNESIUM: CPT | Performed by: STUDENT IN AN ORGANIZED HEALTH CARE EDUCATION/TRAINING PROGRAM

## 2024-03-14 PROCEDURE — 99100 ANES PT EXTEME AGE<1 YR&>70: CPT | Performed by: STUDENT IN AN ORGANIZED HEALTH CARE EDUCATION/TRAINING PROGRAM

## 2024-03-14 PROCEDURE — C2617 STENT, NON-COR, TEM W/O DEL: HCPCS | Performed by: UROLOGY

## 2024-03-14 DEVICE — URETERAL STENT
Type: IMPLANTABLE DEVICE | Site: URETER | Status: FUNCTIONAL
Brand: PERCUFLEX™ PLUS

## 2024-03-14 RX ORDER — TRANEXAMIC ACID 10 MG/ML
INJECTION, SOLUTION INTRAVENOUS AS NEEDED
Status: DISCONTINUED | OUTPATIENT
Start: 2024-03-14 | End: 2024-03-14

## 2024-03-14 RX ORDER — HYDRALAZINE HYDROCHLORIDE 20 MG/ML
5 INJECTION INTRAMUSCULAR; INTRAVENOUS EVERY 30 MIN PRN
Status: DISCONTINUED | OUTPATIENT
Start: 2024-03-14 | End: 2024-03-14 | Stop reason: HOSPADM

## 2024-03-14 RX ORDER — PHENYLEPHRINE HCL IN 0.9% NACL 1 MG/10 ML
SYRINGE (ML) INTRAVENOUS AS NEEDED
Status: DISCONTINUED | OUTPATIENT
Start: 2024-03-14 | End: 2024-03-14

## 2024-03-14 RX ORDER — PROPOFOL 10 MG/ML
INJECTION, EMULSION INTRAVENOUS AS NEEDED
Status: DISCONTINUED | OUTPATIENT
Start: 2024-03-14 | End: 2024-03-14

## 2024-03-14 RX ORDER — LIDOCAINE HYDROCHLORIDE 10 MG/ML
0.1 INJECTION, SOLUTION EPIDURAL; INFILTRATION; INTRACAUDAL; PERINEURAL ONCE
Status: DISCONTINUED | OUTPATIENT
Start: 2024-03-14 | End: 2024-03-14 | Stop reason: HOSPADM

## 2024-03-14 RX ORDER — ONDANSETRON HYDROCHLORIDE 2 MG/ML
INJECTION, SOLUTION INTRAVENOUS AS NEEDED
Status: DISCONTINUED | OUTPATIENT
Start: 2024-03-14 | End: 2024-03-14

## 2024-03-14 RX ORDER — ALBUTEROL SULFATE 0.83 MG/ML
2.5 SOLUTION RESPIRATORY (INHALATION) ONCE AS NEEDED
Status: DISCONTINUED | OUTPATIENT
Start: 2024-03-14 | End: 2024-03-14 | Stop reason: HOSPADM

## 2024-03-14 RX ORDER — LABETALOL HYDROCHLORIDE 5 MG/ML
5 INJECTION, SOLUTION INTRAVENOUS ONCE AS NEEDED
Status: DISCONTINUED | OUTPATIENT
Start: 2024-03-14 | End: 2024-03-14 | Stop reason: HOSPADM

## 2024-03-14 RX ORDER — ROCURONIUM BROMIDE 10 MG/ML
INJECTION, SOLUTION INTRAVENOUS AS NEEDED
Status: DISCONTINUED | OUTPATIENT
Start: 2024-03-14 | End: 2024-03-14

## 2024-03-14 RX ORDER — LIDOCAINE HYDROCHLORIDE 20 MG/ML
INJECTION, SOLUTION EPIDURAL; INFILTRATION; INTRACAUDAL; PERINEURAL AS NEEDED
Status: DISCONTINUED | OUTPATIENT
Start: 2024-03-14 | End: 2024-03-14

## 2024-03-14 RX ORDER — ONDANSETRON HYDROCHLORIDE 2 MG/ML
4 INJECTION, SOLUTION INTRAVENOUS ONCE AS NEEDED
Status: DISCONTINUED | OUTPATIENT
Start: 2024-03-14 | End: 2024-03-14 | Stop reason: HOSPADM

## 2024-03-14 RX ORDER — SODIUM CHLORIDE, SODIUM LACTATE, POTASSIUM CHLORIDE, CALCIUM CHLORIDE 600; 310; 30; 20 MG/100ML; MG/100ML; MG/100ML; MG/100ML
100 INJECTION, SOLUTION INTRAVENOUS CONTINUOUS
Status: DISCONTINUED | OUTPATIENT
Start: 2024-03-14 | End: 2024-03-14 | Stop reason: HOSPADM

## 2024-03-14 RX ORDER — SODIUM CHLORIDE, SODIUM LACTATE, POTASSIUM CHLORIDE, CALCIUM CHLORIDE 600; 310; 30; 20 MG/100ML; MG/100ML; MG/100ML; MG/100ML
INJECTION, SOLUTION INTRAVENOUS CONTINUOUS PRN
Status: DISCONTINUED | OUTPATIENT
Start: 2024-03-14 | End: 2024-03-14

## 2024-03-14 RX ORDER — OXYCODONE HYDROCHLORIDE 5 MG/1
5 TABLET ORAL EVERY 4 HOURS PRN
Status: DISCONTINUED | OUTPATIENT
Start: 2024-03-14 | End: 2024-03-14 | Stop reason: HOSPADM

## 2024-03-14 RX ORDER — FENTANYL CITRATE 50 UG/ML
50 INJECTION, SOLUTION INTRAMUSCULAR; INTRAVENOUS EVERY 5 MIN PRN
Status: DISCONTINUED | OUTPATIENT
Start: 2024-03-14 | End: 2024-03-14 | Stop reason: HOSPADM

## 2024-03-14 RX ADMIN — PRAVASTATIN SODIUM 10 MG: 20 TABLET ORAL at 21:17

## 2024-03-14 RX ADMIN — ONDANSETRON 4 MG: 2 INJECTION, SOLUTION INTRAMUSCULAR; INTRAVENOUS at 16:01

## 2024-03-14 RX ADMIN — ROCURONIUM BROMIDE 50 MG: 10 INJECTION INTRAVENOUS at 15:23

## 2024-03-14 RX ADMIN — SUGAMMADEX 200 MG: 100 INJECTION, SOLUTION INTRAVENOUS at 16:01

## 2024-03-14 RX ADMIN — PRAMIPEXOLE DIHYDROCHLORIDE 0.12 MG: 0.25 TABLET ORAL at 09:27

## 2024-03-14 RX ADMIN — MEMANTINE 10 MG: 10 TABLET ORAL at 21:17

## 2024-03-14 RX ADMIN — Medication 5 MG: at 21:17

## 2024-03-14 RX ADMIN — PROPOFOL 200 MG: 10 INJECTION, EMULSION INTRAVENOUS at 15:24

## 2024-03-14 RX ADMIN — SODIUM CHLORIDE, POTASSIUM CHLORIDE, SODIUM LACTATE AND CALCIUM CHLORIDE: 600; 310; 30; 20 INJECTION, SOLUTION INTRAVENOUS at 15:22

## 2024-03-14 RX ADMIN — LEVOTHYROXINE SODIUM 75 MCG: 75 TABLET ORAL at 06:36

## 2024-03-14 RX ADMIN — INSULIN GLARGINE 10 UNITS: 100 INJECTION, SOLUTION SUBCUTANEOUS at 21:17

## 2024-03-14 RX ADMIN — LIDOCAINE HYDROCHLORIDE 100 MG: 20 INJECTION, SOLUTION EPIDURAL; INFILTRATION; INTRACAUDAL; PERINEURAL at 15:23

## 2024-03-14 RX ADMIN — HEPARIN SODIUM 5000 UNITS: 5000 INJECTION INTRAVENOUS; SUBCUTANEOUS at 00:17

## 2024-03-14 RX ADMIN — MEMANTINE 10 MG: 10 TABLET ORAL at 09:27

## 2024-03-14 RX ADMIN — CEFTRIAXONE SODIUM 1 G: 1 INJECTION, SOLUTION INTRAVENOUS at 13:41

## 2024-03-14 RX ADMIN — HYDRALAZINE HYDROCHLORIDE 10 MG: 20 INJECTION INTRAMUSCULAR; INTRAVENOUS at 00:17

## 2024-03-14 RX ADMIN — CITALOPRAM HYDROBROMIDE 10 MG: 10 TABLET ORAL at 09:27

## 2024-03-14 RX ADMIN — Medication 100 MCG: at 15:23

## 2024-03-14 RX ADMIN — SCOPOLAMINE 1 PATCH: 1.5 PATCH, EXTENDED RELEASE TRANSDERMAL at 03:38

## 2024-03-14 ASSESSMENT — PAIN SCALES - PAIN ASSESSMENT IN ADVANCED DEMENTIA (PAINAD)
TOTALSCORE: 0
CONSOLABILITY: NO NEED TO CONSOLE
FACIALEXPRESSION: SMILING OR INEXPRESSIVE
BREATHING: NORMAL
BODYLANGUAGE: RELAXED

## 2024-03-14 ASSESSMENT — PAIN - FUNCTIONAL ASSESSMENT
PAIN_FUNCTIONAL_ASSESSMENT: 0-10

## 2024-03-14 ASSESSMENT — PAIN SCALES - GENERAL
PAINLEVEL_OUTOF10: 0 - NO PAIN

## 2024-03-14 NOTE — ANESTHESIA PROCEDURE NOTES
Airway  Date/Time: 3/14/2024 3:26 PM  Urgency: elective    Airway not difficult    Staffing  Performed: DANIEL   Authorized by: Nereida Grijalva MD    Performed by: DANIEL Carvajal  Patient location during procedure: OR    Indications and Patient Condition  Indications for airway management: anesthesia  Spontaneous Ventilation: absent  Sedation level: deep  Preoxygenated: yes  Patient position: sniffing  MILS maintained throughout  Mask difficulty assessment: 1 - vent by mask    Final Airway Details  Final airway type: endotracheal airway      Successful airway: ETT  Cuffed: yes   Successful intubation technique: direct laryngoscopy  Endotracheal tube insertion site: oral  Blade: Christopher  Blade size: #4  ETT size (mm): 7.5  Cormack-Lehane Classification: grade I - full view of glottis  Placement verified by: chest auscultation and capnometry   Cuff volume (mL): 6  Measured from: lips  ETT to lips (cm): 22  Number of attempts at approach: 1

## 2024-03-14 NOTE — INTERVAL H&P NOTE
H&P reviewed. The patient was examined and there are no changes to the H&P.      D/w familly diaghter and wife---daughter signs consent

## 2024-03-14 NOTE — CARE PLAN
Pt A&Ox1 this shift, only oriented to self and refusing to answer most orientation questions. Pt afebrile, slightly HTN to 178/80, PRN hydral given x1, otherwise VSS. Pt with decreased UO around 0400, MD made aware and stated to monitor. Yanez in place. ACHS BS. Pt with decreased eating and PO intake, IVF per MAR. Pt currently resting in bed, lowest and locked position, with call bell in reach and bed alarm activated. Will continue to monitor for remainder of shift.         Problem: Pain - Adult  Goal: Verbalizes/displays adequate comfort level or baseline comfort level  Outcome: Progressing     Problem: Safety - Adult  Goal: Free from fall injury  Outcome: Progressing     Problem: Discharge Planning  Goal: Discharge to home or other facility with appropriate resources  Outcome: Progressing     Problem: Chronic Conditions and Co-morbidities  Goal: Patient's chronic conditions and co-morbidity symptoms are monitored and maintained or improved  Outcome: Progressing     Problem: Skin  Goal: Decreased wound size/increased tissue granulation at next dressing change  Outcome: Progressing  Flowsheets (Taken 3/13/2024 2216)  Decreased wound size/increased tissue granulation at next dressing change: Promote sleep for wound healing  Goal: Participates in plan/prevention/treatment measures  Outcome: Progressing  Flowsheets (Taken 3/13/2024 2216)  Participates in plan/prevention/treatment measures: Discuss with provider PT/OT consult  Goal: Prevent/manage excess moisture  Outcome: Progressing  Flowsheets (Taken 3/13/2024 2216)  Prevent/manage excess moisture: Cleanse incontinence/protect with barrier cream  Goal: Prevent/minimize sheer/friction injuries  Outcome: Progressing  Flowsheets (Taken 3/13/2024 2216)  Prevent/minimize sheer/friction injuries: Complete micro-shifts as needed if patient unable. Adjust patient position to relieve pressure points, not a full turn     Problem: Fall/Injury  Goal: Not fall by end of  shift  Outcome: Progressing  Goal: Be free from injury by end of the shift  Outcome: Progressing     Problem: Diabetes  Goal: Achieve decreasing blood glucose levels by end of shift  Outcome: Progressing  Goal: Maintain glucose levels >70mg/dl to <250mg/dl throughout shift  Outcome: Progressing

## 2024-03-14 NOTE — PROGRESS NOTES
"Rogerio Miller is a 88 y.o. male on day 3 of admission presenting with WINSTON (acute kidney injury) (CMS/[facility]).    Subjective   Seen and examined this morning, alert oriented x 1 to himself, has no complaint this morning, no abdominal tenderness or CVA tenderness.      Objective     Physical Exam  Constitutional: elderly, awake/alert/oriented x1, no distress, alert and cooperative   ENMT: mucous membranes moist   Head/Neck: Neck supple   Respiratory/Thorax: Patent airways, CTAB, on RA   Cardiovascular: Regular, rate and rhythm, no murmurs, normal S 1and S 2   Gastrointestinal: Nondistended, soft, no tenderness today, no CBA tenderness,  Yanez catheter in   Musculoskeletal: ROM intact, no joint swelling, normal strength   Extremities: normal extremities, no edema   Neurological: alert and oriented x1, intact senses, motor, response and reflexes, normal strength         Last Recorded Vitals  Blood pressure 128/76, pulse 74, temperature 36.2 °C (97.2 °F), resp. rate 18, height 1.778 m (5' 10\"), weight 90.7 kg (200 lb), SpO2 97 %.  Intake/Output last 3 Shifts:  I/O last 3 completed shifts:  In: 994.7 (11 mL/kg) [P.O.:30; I.V.:914.7 (10.1 mL/kg); IV Piggyback:50]  Out: 1600 (17.6 mL/kg) [Urine:1600 (0.5 mL/kg/hr)]  Weight: 90.7 kg     Relevant Results  Scheduled medications  cefTRIAXone, 1 g, intravenous, q24h  citalopram, 10 mg, oral, Daily  heparin (porcine), 5,000 Units, subcutaneous, q8h KAMI  insulin glargine, 10 Units, subcutaneous, Nightly  insulin lispro, 0-10 Units, subcutaneous, Before meals & nightly  levothyroxine, 75 mcg, oral, Daily before breakfast  melatonin, 5 mg, oral, Nightly  memantine, 10 mg, oral, BID  polyethylene glycol, 17 g, oral, Daily  pramipexole, 0.125 mg, oral, Daily  pravastatin, 10 mg, oral, Nightly      Continuous medications  sodium chloride 0.9%, 60 mL/hr, Last Rate: 60 mL/hr (03/14/24 0228)      PRN medications  PRN medications: dextrose 10 % in water (D10W), dextrose, glucagon, hydrALAZINE, " scopolamine  Results from last 7 days   Lab Units 03/14/24  0510 03/13/24  0638 03/12/24  0435   WBC AUTO x10*3/uL 9.9 9.7 10.3   RBC AUTO x10*6/uL 3.69* 3.51* 3.73*   HEMOGLOBIN g/dL 11.1* 10.6* 11.1*       Results from last 7 days   Lab Units 03/14/24  0510 03/13/24  0638 03/12/24  1818 03/12/24  0436 03/11/24 2020 03/11/24  1622 03/11/24  1234   SODIUM mmol/L 140  --  135*  --  139 140  140 139   POTASSIUM mmol/L 5.0  --  5.0  --  4.6 5.6*  5.6* 6.4*   CHLORIDE mmol/L 106  --  100  --  105 110*  110* 107   CO2 mmol/L 24  --  28  --  21 21  21 22   BUN mg/dL 65*  --  71*  --  74* 76*  76* 76*   CREATININE mg/dL 6.43*  --  6.01*  --  6.35* 6.48*  6.48* 6.66*   CALCIUM mg/dL 8.9  --  8.5*  --  9.2 9.5  9.5 9.8   PHOSPHORUS mg/dL  --   --   --   --   --  4.1  --    MAGNESIUM mg/dL 1.93 1.88  --  1.95  --   --  2.33   BILIRUBIN TOTAL mg/dL  --   --   --   --   --   --  0.5   ALT U/L  --   --   --   --   --   --  10   AST U/L  --   --   --   --   --   --  23       ECG 12 lead    Result Date: 3/12/2024  Electronic ventricular pacemaker When compared with ECG of 06-OCT-2022 23:43, Previous ECG has undetermined rhythm, needs review    ECG 12 lead    Result Date: 3/12/2024  Electronic ventricular pacemaker When compared with ECG of 11-MAR-2024 12:29, (unconfirmed) No significant change was found    US renal complete    Result Date: 3/11/2024  Interpreted By:  Troy Eng, STUDY: US RENAL COMPLETE;  3/11/2024 9:52 pm   INDICATION: Signs/Symptoms:WINSTON.   COMPARISON: None.   ACCESSION NUMBER(S): [id number]   ORDERING CLINICIAN: CHASTITY FUENTES   TECHNIQUE: Multiple images of the kidneys were obtained  .   FINDINGS: RIGHT KIDNEY: The right kidney measures 12.8 cm  in length. The renal cortical echogenicity and thickness are within normal limits. Hydronephrosis is identified.. Degree is moderate   LEFT KIDNEY: The left kidney measures 10.7 in length. The renal cortical echogenicity and thickness are within normal  limits. No hydronephrosis is present; no evidence of nephrolithiasis.   Decompressed urinary bladder.       Significant right-sided hydronephrosis. This is of unknown significance. Distal obstruction should be considered. Further evaluation is advised with CT imaging.   MACRO: None   Signed by: Troy Eng 3/11/2024 10:29 PM Dictation workstation:   [id number]    XR chest 1 view    Result Date: 3/11/2024  Interpreted By:  Chava Pina, STUDY: XR CHEST 1 VIEW   INDICATION: Signs/Symptoms:Chest Pain.   COMPARISON: October 7, 2022   ACCESSION NUMBER(S): [id number]   ORDERING CLINICIAN: SRIKANTH VASQUEZ   FINDINGS: Cardiomegaly with pacemaker unchanged.   No consolidation, effusion, edema, or pneumothorax. Remote right rib fracture.       No evidence of acute intrathoracic abnormality.   Signed by: Chava Pina 3/11/2024 1:27 PM Dictation workstation:   [id number]       Assessment/Plan   Principal Problem:    WINSTON (acute kidney injury) (CMS/HCC)  Active Problems:    Other hydronephrosis  Hyperkalemia, resolved  Urinary retention  Right hydronephrosis  History of Parkinson with dementia  Hypertension  Hyperlipidemia  History of CAD  on pacemaker     -Blood pressure stable.  -Hyperkalemia resolved status post Lokelma  -Kidney function slightly worse today 6.4 > 6.0 from 6.6 on admission, currently in normal saline 60 cc/h, nephrology following  -Renal ultrasound showed right hydronephrosis, CT scan was done yesterday and did show bilateral kidney stone, urology consulted and plans for stent placement today bilaterally  -Poor urine output today expected from obstruction  -Urinary retention on admission s/p Yanez catheter placed, keep Yanez for strict I's and O's  -UA showed protein and glucose, no casts, ?? UTI started on ceftriaxone pending cx, could not assess if he is symptomatic or not.  -Telemetry  -Regular diet, encourage oral hydration, currently n.p.o. for procedure  -Strict I's and O's  -Avoid nephrotoxic  medication  -Blood glucose this morning 180s , will continue with sliding scale insulin and will resume lantus, takes 25 units at night will start with 10 units  -CODE STATUS full code confirmed with POA on admission  -DVT prophylaxis with heparin subcu  -GI prophylaxis not indicated  -I tried to call his daughter Nayely twice a day for an update, no answer.       I spent 35 minutes in the professional and overall care of this patient.      Rohit Fontaine MD

## 2024-03-14 NOTE — H&P (VIEW-ONLY)
Reason For Consult  Bilateral ureter stones    Hpi as below  Essentially admit w/ winston----shauna showed hydro 3/11 and then ct 3/13 shows b/l ureter stones w/ larger burden on right where sig hydro noted; on left has ureter stone as well w/ smaller kidney    Labs/xrays/notes indep reviewed/interpret    Rogerio Miller is a 88 y.o. male presenting with abnormal labs, patient has been feeling weak and there is questionable report about his poor oral intake labs were obtained outpatient and his creatinine was 6.1 and potassium 6.1 came for further evaluation, patient reported generalized weakness he is alert oriented x 1 to himself only however he was alert oriented x 2 to the ED physician and the daughter earlier, daughter at the bedside, patient denies any complaints no nausea or vomiting no constipation or diarrhea, no fever no chills, no chest pain or shortness of breath, no recent change in his medications.     On arrival to the ED blood pressure was elevated 196/84, otherwise stable vital signs trending down without any intervention, labs showed potassium 6.4 but hemolyzed, creatinine 6.6, hemoglobin 12.2 at baseline, ABG was done and showed lactic acid of 0.7, potassium on the ABG was 5.8, nephrology consulted from the ED Yanez catheter was ordered not placed yet, started on sodium bicarb drip 100 cc/h, Lokelma 10 mg given in the ED and labs to be repeated later, admitted for further treatment for WINSTON and hyperkalemia.     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     Social  History  He has no history on file for tobacco use, alcohol use, and drug use.     Family History  Family History   No family history on file.        Allergies  Aspirin, Levofloxacin, and Sulfa (sulfonamide antibiotics)     Review of Systems   ROS: 12 systems reviewed and negative except per HPI above    Results for orders placed or performed during the hospital encounter of 03/11/24 (from the past 24 hour(s))   POCT GLUCOSE   Result Value Ref Range    POCT Glucose 130 (H) 74 - 99 mg/dL   POCT GLUCOSE   Result Value Ref Range    POCT Glucose 128 (H) 74 - 99 mg/dL   POCT GLUCOSE   Result Value Ref Range    POCT Glucose 125 (H) 74 - 99 mg/dL   POCT GLUCOSE   Result Value Ref Range    POCT Glucose 104 (H) 74 - 99 mg/dL   POCT GLUCOSE   Result Value Ref Range    POCT Glucose 114 (H) 74 - 99 mg/dL   CBC   Result Value Ref Range    WBC 9.9 4.4 - 11.3 x10*3/uL    nRBC 0.0 0.0 - 0.0 /100 WBCs    RBC 3.69 (L) 4.50 - 5.90 x10*6/uL    Hemoglobin 11.1 (L) 13.5 - 17.5 g/dL    Hematocrit 35.4 (L) 41.0 - 52.0 %    MCV 96 80 - 100 fL    MCH 30.1 26.0 - 34.0 pg    MCHC 31.4 (L) 32.0 - 36.0 g/dL    RDW 12.7 11.5 - 14.5 %    Platelets 202 150 - 450 x10*3/uL   Magnesium   Result Value Ref Range    Magnesium 1.93 1.60 - 2.40 mg/dL   Basic Metabolic Panel   Result Value Ref Range    Glucose 113 (H) 74 - 99 mg/dL    Sodium 140 136 - 145 mmol/L    Potassium 5.0 3.5 - 5.3 mmol/L    Chloride 106 98 - 107 mmol/L    Bicarbonate 24 21 - 32 mmol/L    Anion Gap 15 10 - 20 mmol/L    Urea Nitrogen 65 (H) 6 - 23 mg/dL    Creatinine 6.43 (H) 0.50 - 1.30 mg/dL    eGFR 8 (L) >60 mL/min/1.73m*2    Calcium 8.9 8.6 - 10.3 mg/dL   POCT GLUCOSE   Result Value Ref Range    POCT Glucose 107 (H) 74 - 99 mg/dL      CT abdomen pelvis wo IV contrast    Result Date: 3/13/2024  Interpreted By:  Guillermo Alegria, STUDY: CT ABDOMEN PELVIS WO IV CONTRAST;  3/13/2024 4:49 pm   INDICATION: Signs/Symptoms:WINSTON, hydronephrosis on the ultrasound.   COMPARISON:  Ultrasound of the kidneys dated 03/11/2024   ACCESSION NUMBER(S): [id number]   ORDERING CLINICIAN: CHASTITY FUENTES   TECHNIQUE: CT of the abdomen and pelvis was performed. Contiguous axial images were obtained at 3 mm slice thickness through the abdomen and pelvis. Coronal and sagittal reconstructions at 3 mm slice thickness were performed.  No intravenous or oral contrast agents were administered.   FINDINGS: Please note that the evaluation of vessels, lymph nodes and organs is limited without intravenous contrast.   LOWER CHEST: Evaluation of the lung bases is degraded by motion. Within limits of the study, there is suggestion of trace left-sided pleural effusion with mild atelectatic changes. No consolidation or sizable pleural effusion is identified in the right lung.   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   Heart is upper limits of normal in size, with marked coronary artery calcifications. No pericardial effusion is evident.   ABDOMEN:   Exam is somewhat degraded by motion. Additionally, evaluation of the upper abdomen is somewhat degraded by beam hardening artifact from the patient's arms, which are draped over lower chest/upper abdomen   LIVER: Within limits of noncontrast exam, liver does not demonstrate any acute abnormalities   BILE DUCTS: No intrahepatic or extrahepatic biliary dilatation is present.   GALLBLADDER: No radiopaque stones are identified in the gallbladder. No wall thickening or pericholecystic stranding is present   PANCREAS: No pancreatic ductal dilatation or peripancreatic stranding is evident. There is fatty replacement of the pancreas.   SPLEEN: Spleen is unremarkable in appearance.   ADRENAL GLANDS: Mild thickening is present in the right adrenal gland. Left adrenal gland is unremarkable in appearance.   KIDNEYS AND URETERS: There is marked hydronephrosis present in the left kidney, with a 1.3 cm radiopaque stone  present in the distal right ureter, and an additional 0.7 cm radiopaque stone present slightly more distally in the right ureter (series 201, image 134) of the distal most segment of the right ureter is nondistended.   The left kidney is somewhat smaller in size compared to the contralateral right side, although does not demonstrate any hydronephrosis. A 0.4 cm radiopaque stone is present in the lower pole of the left kidney, with suggestion of 0.9 cm cluster of radiopaque stones also present in the distal left ureter (series 201, image 138), although there is no significant upstream dilatation of the ureter.   PELVIS:   BLADDER: Bladder demonstrates circumferential wall thickening and is decompressed with Yanez catheter. An additional 0.4 cm radiopaque stone is present within the bladder in the dependent position, near the left UVJ (series 201, image 140).   REPRODUCTIVE ORGANS: Prostate is slightly enlarged. No suspicious pelvic masses are present.   BOWEL: Stomach is nondistended and unremarkable in appearance. Small bowel is not dilated. Appendix is unremarkable in appearance in the right lower quadrant.   Numerous diverticula are present in the descending and sigmoid colon without evidence of acute diverticulitis or other inflammatory bowel wall thickening.   VESSELS: Scattered vascular calcifications are present in the abdominal aorta and its branching vessels.   PERITONEUM/RETROPERITONEUM/LYMPH NODES: There is mild somewhat asymmetric stranding present surrounding the right kidney without evidence of free fluid or free air. No thick-walled collections are identified in the abdomen and pelvis. No enlarged lymphadenopathy is present.   ABDOMINAL WALL: A 4.6 cm fat containing umbilical hernia is present (series 201, image 111).   Punctate focus of air present in the cutaneous tissues of the left anterior abdominal wall (series 201, image 92) may be secondary to recent subcutaneous injection.   BONES: Multilevel  degenerative changes are present in the thoracic and lumbar spine, with likely at least moderate if not severe stenosis suspected at the level of L3-L4 and L4-L5 due to hypertrophic facet changes, disc osteophyte complex and ligamentum flavum thickening.   Degenerative changes are present in the hips bilaterally.   No acute abnormalities are identified.       1.  Right-sided hydronephrosis secondary to a 1.3 cm and a 0.7 cm radiopaque stones the distal right ureter. Urological consultation is recommended. 2. A 0.9 cm cluster radiopaque stones appear to be present in the distal left ureter, although there is no evidence of left-sided hydronephrosis or ureteral dilatation. Somewhat asymmetric decrease in size of the left kidney compared to the contralateral right side may be secondary to obstructive uropathy versus chronic ischemia. 3. Circumferential wall thickening in the bladder, which is decompressed with Yanez catheter in place. Correlate with clinical symptoms of cystitis. 4. Numerous diverticula are present in the descending and sigmoid colon without evidence of acute diverticulitis. 5. Small left-sided pleural effusion with some atelectatic changes in the left lung base. 6. A 4.6 cm fat containing umbilical hernia. 7. Punctate focus of subcutaneous air in the left anterior abdominal wall may be secondary to recent subcutaneous injection. Correlate clinically.     MACRO: None   Signed by: Guillermo Alegria 3/13/2024 6:41 PM Dictation workstation:   [id number]      A/p    -bilateral ureter stone   -edilberto  -uti  -afib    Will need b/l ureter stents---spoke w/ floor and just ate small amount of oatmeal.         Case scheduled for 3pm today    Nurse instructed to make npo  Hold heparin       Stephen Trevizo MD

## 2024-03-14 NOTE — CONSULTS
Reason For Consult  Bilateral ureter stones    Hpi as below  Essentially admit w/ [facility]----shauna showed hydro 3/11 and then ct 3/13 shows b/l ureter stones w/ larger burden on right where sig hydro noted; on left has ureter stone as well w/ smaller kidney    Labs/xrays/notes indep reviewed/interpret    Rogerio Miller is a 88 y.o. male presenting with abnormal labs, patient has been feeling weak and there is questionable report about his poor oral intake labs were obtained outpatient and his creatinine was 6.1 and potassium 6.1 came for further evaluation, patient reported generalized weakness he is alert oriented x 1 to himself only however he was alert oriented x 2 to the ED physician and the daughter earlier, daughter at the bedside, patient denies any complaints no nausea or vomiting no constipation or diarrhea, no fever no chills, no chest pain or shortness of breath, no recent change in his medications.     On arrival to the ED blood pressure was elevated 196/84, otherwise stable vital signs trending down without any intervention, labs showed potassium 6.4 but hemolyzed, creatinine 6.6, hemoglobin 12.2 at baseline, ABG was done and showed lactic acid of 0.7, potassium on the ABG was 5.8, nephrology consulted from the ED Yanez catheter was ordered not placed yet, started on sodium bicarb drip 100 cc/h, Lokelma 10 mg given in the ED and labs to be repeated later, admitted for further treatment for WINSTON and hyperkalemia.     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     Social  History  He has no history on file for tobacco use, alcohol use, and drug use.     Family History  Family History   No family history on file.        Allergies  Aspirin, Levofloxacin, and Sulfa (sulfonamide antibiotics)     Review of Systems   ROS: 12 systems reviewed and negative except per HPI above    Results for orders placed or performed during the hospital encounter of 03/11/24 (from the past 24 hour(s))   POCT GLUCOSE   Result Value Ref Range    POCT Glucose 130 (H) 74 - 99 mg/dL   POCT GLUCOSE   Result Value Ref Range    POCT Glucose 128 (H) 74 - 99 mg/dL   POCT GLUCOSE   Result Value Ref Range    POCT Glucose 125 (H) 74 - 99 mg/dL   POCT GLUCOSE   Result Value Ref Range    POCT Glucose 104 (H) 74 - 99 mg/dL   POCT GLUCOSE   Result Value Ref Range    POCT Glucose 114 (H) 74 - 99 mg/dL   CBC   Result Value Ref Range    WBC 9.9 4.4 - 11.3 x10*3/uL    nRBC 0.0 0.0 - 0.0 /100 WBCs    RBC 3.69 (L) 4.50 - 5.90 x10*6/uL    Hemoglobin 11.1 (L) 13.5 - 17.5 g/dL    Hematocrit 35.4 (L) 41.0 - 52.0 %    MCV 96 80 - 100 fL    MCH 30.1 26.0 - 34.0 pg    MCHC 31.4 (L) 32.0 - 36.0 g/dL    RDW 12.7 11.5 - 14.5 %    Platelets 202 150 - 450 x10*3/uL   Magnesium   Result Value Ref Range    Magnesium 1.93 1.60 - 2.40 mg/dL   Basic Metabolic Panel   Result Value Ref Range    Glucose 113 (H) 74 - 99 mg/dL    Sodium 140 136 - 145 mmol/L    Potassium 5.0 3.5 - 5.3 mmol/L    Chloride 106 98 - 107 mmol/L    Bicarbonate 24 21 - 32 mmol/L    Anion Gap 15 10 - 20 mmol/L    Urea Nitrogen 65 (H) 6 - 23 mg/dL    Creatinine 6.43 (H) 0.50 - 1.30 mg/dL    eGFR 8 (L) >60 mL/min/1.73m*2    Calcium 8.9 8.6 - 10.3 mg/dL   POCT GLUCOSE   Result Value Ref Range    POCT Glucose 107 (H) 74 - 99 mg/dL      CT abdomen pelvis wo IV contrast    Result Date: 3/13/2024  Interpreted By:  Guillermo Alegria, STUDY: CT ABDOMEN PELVIS WO IV CONTRAST;  3/13/2024 4:49 pm   INDICATION: Signs/Symptoms:WINSTON, hydronephrosis on the ultrasound.   COMPARISON:  Ultrasound of the kidneys dated 03/11/2024   ACCESSION NUMBER(S): [id number]   ORDERING CLINICIAN: CHASTITY FUENTES   TECHNIQUE: CT of the abdomen and pelvis was performed. Contiguous axial images were obtained at 3 mm slice thickness through the abdomen and pelvis. Coronal and sagittal reconstructions at 3 mm slice thickness were performed.  No intravenous or oral contrast agents were administered.   FINDINGS: Please note that the evaluation of vessels, lymph nodes and organs is limited without intravenous contrast.   LOWER CHEST: Evaluation of the lung bases is degraded by motion. Within limits of the study, there is suggestion of trace left-sided pleural effusion with mild atelectatic changes. No consolidation or sizable pleural effusion is identified in the right lung.   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   Heart is upper limits of normal in size, with marked coronary artery calcifications. No pericardial effusion is evident.   ABDOMEN:   Exam is somewhat degraded by motion. Additionally, evaluation of the upper abdomen is somewhat degraded by beam hardening artifact from the patient's arms, which are draped over lower chest/upper abdomen   LIVER: Within limits of noncontrast exam, liver does not demonstrate any acute abnormalities   BILE DUCTS: No intrahepatic or extrahepatic biliary dilatation is present.   GALLBLADDER: No radiopaque stones are identified in the gallbladder. No wall thickening or pericholecystic stranding is present   PANCREAS: No pancreatic ductal dilatation or peripancreatic stranding is evident. There is fatty replacement of the pancreas.   SPLEEN: Spleen is unremarkable in appearance.   ADRENAL GLANDS: Mild thickening is present in the right adrenal gland. Left adrenal gland is unremarkable in appearance.   KIDNEYS AND URETERS: There is marked hydronephrosis present in the left kidney, with a 1.3 cm radiopaque stone  present in the distal right ureter, and an additional 0.7 cm radiopaque stone present slightly more distally in the right ureter (series 201, image 134) of the distal most segment of the right ureter is nondistended.   The left kidney is somewhat smaller in size compared to the contralateral right side, although does not demonstrate any hydronephrosis. A 0.4 cm radiopaque stone is present in the lower pole of the left kidney, with suggestion of 0.9 cm cluster of radiopaque stones also present in the distal left ureter (series 201, image 138), although there is no significant upstream dilatation of the ureter.   PELVIS:   BLADDER: Bladder demonstrates circumferential wall thickening and is decompressed with Yanez catheter. An additional 0.4 cm radiopaque stone is present within the bladder in the dependent position, near the left UVJ (series 201, image 140).   REPRODUCTIVE ORGANS: Prostate is slightly enlarged. No suspicious pelvic masses are present.   BOWEL: Stomach is nondistended and unremarkable in appearance. Small bowel is not dilated. Appendix is unremarkable in appearance in the right lower quadrant.   Numerous diverticula are present in the descending and sigmoid colon without evidence of acute diverticulitis or other inflammatory bowel wall thickening.   VESSELS: Scattered vascular calcifications are present in the abdominal aorta and its branching vessels.   PERITONEUM/RETROPERITONEUM/LYMPH NODES: There is mild somewhat asymmetric stranding present surrounding the right kidney without evidence of free fluid or free air. No thick-walled collections are identified in the abdomen and pelvis. No enlarged lymphadenopathy is present.   ABDOMINAL WALL: A 4.6 cm fat containing umbilical hernia is present (series 201, image 111).   Punctate focus of air present in the cutaneous tissues of the left anterior abdominal wall (series 201, image 92) may be secondary to recent subcutaneous injection.   BONES: Multilevel  degenerative changes are present in the thoracic and lumbar spine, with likely at least moderate if not severe stenosis suspected at the level of L3-L4 and L4-L5 due to hypertrophic facet changes, disc osteophyte complex and ligamentum flavum thickening.   Degenerative changes are present in the hips bilaterally.   No acute abnormalities are identified.       1.  Right-sided hydronephrosis secondary to a 1.3 cm and a 0.7 cm radiopaque stones the distal right ureter. Urological consultation is recommended. 2. A 0.9 cm cluster radiopaque stones appear to be present in the distal left ureter, although there is no evidence of left-sided hydronephrosis or ureteral dilatation. Somewhat asymmetric decrease in size of the left kidney compared to the contralateral right side may be secondary to obstructive uropathy versus chronic ischemia. 3. Circumferential wall thickening in the bladder, which is decompressed with Yanez catheter in place. Correlate with clinical symptoms of cystitis. 4. Numerous diverticula are present in the descending and sigmoid colon without evidence of acute diverticulitis. 5. Small left-sided pleural effusion with some atelectatic changes in the left lung base. 6. A 4.6 cm fat containing umbilical hernia. 7. Punctate focus of subcutaneous air in the left anterior abdominal wall may be secondary to recent subcutaneous injection. Correlate clinically.     MACRO: None   Signed by: Guillermo Alegria 3/13/2024 6:41 PM Dictation workstation:   [id number]      A/p    -bilateral ureter stone   -edilberto  -uti  -afib    Will need b/l ureter stents---spoke w/ floor and just ate small amount of oatmeal.         Case scheduled for 3pm today    Nurse instructed to make npo  Hold heparin       Stephen Trevizo MD

## 2024-03-14 NOTE — CARE PLAN
The patient's goals for the shift include      The clinical goals for the shift include improve urine output.    Goal met. Pt. Got urethral stents placed bilaterally. Urine output went from 0ml to 375 of pink tinged urine. Pt. Had 0ml of urine in Am bladder scan. Yanez remains in place. No pain. Pt. Is resting. Family updated.

## 2024-03-14 NOTE — OP NOTE
Cystoscopy with BILATERAL Insertion Stent Ureter, BLADDER STONE REMOVAL, RIGHT RETROGRADE PYELOGRAM (B)  and bladder stone breakage/removal <2cmOperative Note     Date: 3/14/2024  OR Location: [facility] OR    Name: Rogerio Miller, : 1935, Age: 88 y.o., MRN: [id number], Sex: male    Diagnosis  Pre-op Diagnosis     * WINSTON (acute kidney injury) (CMS/HCC) [N17.9]     * Other hydronephrosis [N13.39] Post-op Diagnosis     * WINSTON (acute kidney injury) (CMS/HCC) [N17.9]     * Other hydronephrosis [N13.39]     Procedures  Cystoscopy with BILATERAL Insertion Stent Ureter, BLADDER STONE REMOVAL, RIGHT RETROGRADE PYELOGRAM  13476 - MT CYSTO W/INSERT URETERAL STENT      Surgeons      * Stephen Trevizo - Primary    Resident/Fellow/Other Assistant:  Surgeon(s) and Role:    Procedure Summary  Anesthesia: General  ASA: III  Anesthesia Staff: Anesthesiologist: Nereida Grijalva MD  C-AA: DANIEL Carvajal  Estimated Blood Loss: 0mL  Intra-op Medications:   Administrations occurring from 1500 to 1600 on 24:   Medication Name Total Dose   cefTRIAXone (Rocephin) IVPB 1 g Cannot be calculated   citalopram (CeleXA) tablet 10 mg Cannot be calculated   dextrose 10 % in water (D10W) infusion Cannot be calculated   dextrose 50 % injection 25 g Cannot be calculated   glucagon (Glucagen) injection 1 mg Cannot be calculated   heparin (porcine) injection 5,000 Units Cannot be calculated   hydrALAZINE (Apresoline) injection 10 mg Cannot be calculated   insulin glargine (Lantus) injection 10 Units Cannot be calculated   insulin lispro (HumaLOG) injection 0-10 Units Cannot be calculated   levothyroxine (Synthroid, Levoxyl) tablet 75 mcg Cannot be calculated   melatonin tablet 5 mg Cannot be calculated   memantine (Namenda) tablet 10 mg Cannot be calculated   polyethylene glycol (Glycolax, Miralax) packet 17 g Cannot be calculated   pramipexole (Mirapex) tablet 0.125 mg Cannot be calculated   pravastatin (Pravachol) tablet 10 mg Cannot be  calculated   scopolamine (Transderm-Scop) patch 1 patch Cannot be calculated              Anesthesia Record               Intraprocedure I/O Totals          Intake    Propofol Drip 0.00 mL    The total shown is the total volume documented since Anesthesia Start was filed.    LR infusion 1000.00 mL    Total Intake 1000 mL          Specimen:   ID Type Source Tests Collected by Time   A : RENAL STONES FOR ANALYSIS Calculus Urine, Catheter CALCULI (STONE) ANALYSIS Stephen Trevizo MD 3/14/2024 4564        Staff:   Circulator: Stephanie Garcia RN  Scrub Person: Felicita Brooklyn         Drains and/or Catheters:   Urethral Catheter Non-latex 16 Fr. (Active)       Tourniquet Times:         Implants:  Implants       Type Name Action Serial No.      Stent STENT, URETERAL PERCUFLEX 6 X 28 - [id number] Implanted      Stent STENT, URETERAL PERCUFLEX 6 X 28 - [id number] Implanted      Stent STENT, URETERAL PERCUFLEX 6 X 28 - [id number] Implanted               Findings: stone in bladder/b-l ureters    Indications: Rogerio Miller is an 88 y.o. male who is having surgery for WINSTON (acute kidney injury) (CMS/HCC) [N17.9]  Other hydronephrosis [N13.39].     The patient was seen in the preoperative area. The risks, benefits, complications, treatment options, non-operative alternatives, expected recovery and outcomes were discussed with the patient. The possibilities of reaction to medication, pulmonary aspiration, injury to surrounding structures, bleeding, recurrent infection, the need for additional procedures, failure to diagnose a condition, and creating a complication requiring transfusion or operation were discussed with the patient. The patient concurred with the proposed plan, giving informed consent.  The site of surgery was properly noted/marked if necessary per policy. The patient has been actively warmed in preoperative area. Preoperative antibiotics have been ordered and given within 2 hours of incision. Venous thrombosis prophylaxis  have been ordered including bilateral sequential compression devices    Procedure Details:     Pt prepped/draped sterile.   21 fr cystscopy placed.   Using angled catheter and 0.35 angled wire stones manipulated b/l ureter allowing wire to pass into kidneys over which 28/6 jj stents placed bilateral.   R rpg showed hydro.   Bladder had stone as well broken and removed (larger one pulled from right uo too big to break w/o laser).    16fr figueroa placed at end w/ 10cc in balloon.    Dispostion---will need fu b/l urs/stone package and residual bladder stone removal in 3-4 weeks once edilberto/uti resolved.      Stephen Trevizo  Phone Number: [phone]

## 2024-03-14 NOTE — ANESTHESIA PREPROCEDURE EVALUATION
Patient: Rogerio Miller    Procedure Information       Date/Time: 03/14/24 1500    Procedure: Cystoscopy with Insertion Stent Ureter (Bilateral) - possible retrogrades.    Location: [facility] OR 09 / [facility]    Surgeons: Stephen Trevizo MD            Relevant Problems   Anesthesia (within normal limits)      /Renal   (+) WINSTON (acute kidney injury) (CMS/HCC)   (+) Other hydronephrosis       Clinical information reviewed:    Allergies  Meds   Med Hx  Surg Hx   Fam Hx  Soc Hx        NPO Detail:  No data recorded     Physical Exam    Airway  Mallampati: II  TM distance: >3 FB  Neck ROM: full     Cardiovascular   Rhythm: regular  Rate: normal     Dental    Pulmonary   Breath sounds clear to auscultation     Abdominal   Abdomen: soft             Anesthesia Plan    History of general anesthesia?: yes  History of complications of general anesthesia?: no    ASA 3     general     intravenous induction   Postoperative administration of opioids is intended.  Anesthetic plan and risks discussed with patient.    Plan discussed with CAA, CRNA and attending.

## 2024-03-14 NOTE — ANESTHESIA POSTPROCEDURE EVALUATION
Patient: Rogerio Miller    Procedure Summary       Date: 03/14/24 Room / Location: [facility] 09 / [facility]    Anesthesia Start: 1515 Anesthesia Stop: 1619    Procedure: Cystoscopy with BILATERAL Insertion Stent Ureter, BLADDER STONE REMOVAL, RIGHT RETROGRADE PYELOGRAM (Bilateral) Diagnosis:       WINSTON (acute kidney injury) (CMS/HCC)      Other hydronephrosis      (WINSTON (acute kidney injury) (CMS/HCC) [N17.9])      (Other hydronephrosis [N13.39])    Surgeons: Stephen Trevizo MD Responsible Provider: Nereida Grijalva MD    Anesthesia Type: general ASA Status: 3            Anesthesia Type: general    Vitals Value Taken Time   /75 03/14/24 1614   Temp 36.1 03/14/24 1620   Pulse 64 03/14/24 1618   Resp 18 03/14/24 1618   SpO2 99 % 03/14/24 1618   Vitals shown include unvalidated device data.    Anesthesia Post Evaluation    Patient location during evaluation: PACU  Patient participation: complete - patient participated  Level of consciousness: sleepy but conscious  Pain management: satisfactory to patient  Airway patency: patent  Cardiovascular status: acceptable and hemodynamically stable  Respiratory status: acceptable, face mask and spontaneous ventilation  Hydration status: acceptable  Postoperative Nausea and Vomiting: none        No notable events documented.

## 2024-03-15 ENCOUNTER — APPOINTMENT (OUTPATIENT)
Dept: CARDIOLOGY | Facility: HOSPITAL | Age: 89
DRG: 659 | End: 2024-03-15
Payer: MEDICARE

## 2024-03-15 LAB
ANION GAP SERPL CALC-SCNC: 16 MMOL/L (ref 10–20)
BUN SERPL-MCNC: 67 MG/DL (ref 6–23)
CALCIUM SERPL-MCNC: 9.4 MG/DL (ref 8.6–10.3)
CHLORIDE SERPL-SCNC: 108 MMOL/L (ref 98–107)
CO2 SERPL-SCNC: 24 MMOL/L (ref 21–32)
CREAT SERPL-MCNC: 6.47 MG/DL (ref 0.5–1.3)
EGFRCR SERPLBLD CKD-EPI 2021: 8 ML/MIN/1.73M*2
ERYTHROCYTE [DISTWIDTH] IN BLOOD BY AUTOMATED COUNT: 12.8 % (ref 11.5–14.5)
GLUCOSE BLD MANUAL STRIP-MCNC: 113 MG/DL (ref 74–99)
GLUCOSE BLD MANUAL STRIP-MCNC: 123 MG/DL (ref 74–99)
GLUCOSE BLD MANUAL STRIP-MCNC: 128 MG/DL (ref 74–99)
GLUCOSE BLD MANUAL STRIP-MCNC: 188 MG/DL (ref 74–99)
GLUCOSE SERPL-MCNC: 95 MG/DL (ref 74–99)
HCT VFR BLD AUTO: 37.1 % (ref 41–52)
HGB BLD-MCNC: 11.5 G/DL (ref 13.5–17.5)
MAGNESIUM SERPL-MCNC: 1.98 MG/DL (ref 1.6–2.4)
MCH RBC QN AUTO: 30 PG (ref 26–34)
MCHC RBC AUTO-ENTMCNC: 31 G/DL (ref 32–36)
MCV RBC AUTO: 97 FL (ref 80–100)
NRBC BLD-RTO: 0 /100 WBCS (ref 0–0)
PLATELET # BLD AUTO: 220 X10*3/UL (ref 150–450)
POTASSIUM SERPL-SCNC: 4.7 MMOL/L (ref 3.5–5.3)
RBC # BLD AUTO: 3.83 X10*6/UL (ref 4.5–5.9)
SODIUM SERPL-SCNC: 143 MMOL/L (ref 136–145)
WBC # BLD AUTO: 8.9 X10*3/UL (ref 4.4–11.3)

## 2024-03-15 PROCEDURE — 99233 SBSQ HOSP IP/OBS HIGH 50: CPT | Performed by: STUDENT IN AN ORGANIZED HEALTH CARE EDUCATION/TRAINING PROGRAM

## 2024-03-15 PROCEDURE — 2500000002 HC RX 250 W HCPCS SELF ADMINISTERED DRUGS (ALT 637 FOR MEDICARE OP, ALT 636 FOR OP/ED): Performed by: UROLOGY

## 2024-03-15 PROCEDURE — 83735 ASSAY OF MAGNESIUM: CPT | Performed by: UROLOGY

## 2024-03-15 PROCEDURE — 85027 COMPLETE CBC AUTOMATED: CPT | Performed by: UROLOGY

## 2024-03-15 PROCEDURE — 2060000001 HC INTERMEDIATE ICU ROOM DAILY

## 2024-03-15 PROCEDURE — 2500000001 HC RX 250 WO HCPCS SELF ADMINISTERED DRUGS (ALT 637 FOR MEDICARE OP): Performed by: UROLOGY

## 2024-03-15 PROCEDURE — 51701 INSERT BLADDER CATHETER: CPT

## 2024-03-15 PROCEDURE — 2500000004 HC RX 250 GENERAL PHARMACY W/ HCPCS (ALT 636 FOR OP/ED): Performed by: UROLOGY

## 2024-03-15 PROCEDURE — 82947 ASSAY GLUCOSE BLOOD QUANT: CPT

## 2024-03-15 PROCEDURE — 2500000004 HC RX 250 GENERAL PHARMACY W/ HCPCS (ALT 636 FOR OP/ED): Performed by: INTERNAL MEDICINE

## 2024-03-15 PROCEDURE — 80048 BASIC METABOLIC PNL TOTAL CA: CPT | Performed by: UROLOGY

## 2024-03-15 PROCEDURE — 93005 ELECTROCARDIOGRAM TRACING: CPT

## 2024-03-15 PROCEDURE — 36415 COLL VENOUS BLD VENIPUNCTURE: CPT | Performed by: UROLOGY

## 2024-03-15 RX ORDER — ZIPRASIDONE MESYLATE 20 MG/ML
20 INJECTION, POWDER, LYOPHILIZED, FOR SOLUTION INTRAMUSCULAR ONCE
Status: COMPLETED | OUTPATIENT
Start: 2024-03-15 | End: 2024-03-16

## 2024-03-15 RX ORDER — SODIUM CHLORIDE 9 MG/ML
60 INJECTION, SOLUTION INTRAVENOUS CONTINUOUS
Status: DISCONTINUED | OUTPATIENT
Start: 2024-03-15 | End: 2024-03-17

## 2024-03-15 RX ADMIN — Medication 5 MG: at 22:25

## 2024-03-15 RX ADMIN — LEVOTHYROXINE SODIUM 75 MCG: 75 TABLET ORAL at 06:11

## 2024-03-15 RX ADMIN — PRAMIPEXOLE DIHYDROCHLORIDE 0.12 MG: 0.25 TABLET ORAL at 08:41

## 2024-03-15 RX ADMIN — HEPARIN SODIUM 5000 UNITS: 5000 INJECTION INTRAVENOUS; SUBCUTANEOUS at 08:40

## 2024-03-15 RX ADMIN — MEMANTINE 10 MG: 10 TABLET ORAL at 22:26

## 2024-03-15 RX ADMIN — PRAVASTATIN SODIUM 10 MG: 20 TABLET ORAL at 22:26

## 2024-03-15 RX ADMIN — CEFTRIAXONE SODIUM 1 G: 1 INJECTION, SOLUTION INTRAVENOUS at 12:47

## 2024-03-15 RX ADMIN — MEMANTINE 10 MG: 10 TABLET ORAL at 08:41

## 2024-03-15 RX ADMIN — CITALOPRAM HYDROBROMIDE 10 MG: 10 TABLET ORAL at 08:41

## 2024-03-15 RX ADMIN — POLYETHYLENE GLYCOL 3350 17 G: 17 POWDER, FOR SOLUTION ORAL at 08:40

## 2024-03-15 RX ADMIN — INSULIN GLARGINE 10 UNITS: 100 INJECTION, SOLUTION SUBCUTANEOUS at 22:26

## 2024-03-15 RX ADMIN — SODIUM CHLORIDE 60 ML/HR: 9 INJECTION, SOLUTION INTRAVENOUS at 12:17

## 2024-03-15 ASSESSMENT — PAIN SCALES - PAIN ASSESSMENT IN ADVANCED DEMENTIA (PAINAD)
TOTALSCORE: REPOSITIONED
TOTALSCORE: 0
CONSOLABILITY: UNABLE TO CONSOLE, DISTRACT OR REASSURE
BODYLANGUAGE: RELAXED
TOTALSCORE: 0
BREATHING: NORMAL
FACIALEXPRESSION: SMILING OR INEXPRESSIVE
CONSOLABILITY: NO NEED TO CONSOLE
FACIALEXPRESSION: SAD, FRIGHTENED, FROWN
BREATHING: NORMAL
BODYLANGUAGE: RELAXED
CONSOLABILITY: NO NEED TO CONSOLE
BREATHING: NORMAL
TOTALSCORE: 5
CONSOLABILITY: NO NEED TO CONSOLE
BREATHING: NORMAL
BODYLANGUAGE: RELAXED
TOTALSCORE: 0
TOTALSCORE: REPOSITIONED;EMOTIONAL SUPPORT
NEGVOCALIZATION: OCCASIONAL MOAN/GROAN, LOW SPEECH, NEGATIVE/DISAPPROVING QUALITY
BODYLANGUAGE: TENSE, DISTRESSED PACING, FIDGETING
TOTALSCORE: REPOSITIONED;HEAT APPLIED;EMOTIONAL SUPPORT
FACIALEXPRESSION: SMILING OR INEXPRESSIVE
FACIALEXPRESSION: SMILING OR INEXPRESSIVE

## 2024-03-15 ASSESSMENT — PAIN - FUNCTIONAL ASSESSMENT
PAIN_FUNCTIONAL_ASSESSMENT: 0-10

## 2024-03-15 ASSESSMENT — PAIN SCALES - GENERAL
PAINLEVEL_OUTOF10: 0 - NO PAIN

## 2024-03-15 NOTE — PROGRESS NOTES
Per [facility], pt is from [facility] and is requesting to be skilled there.  Referral sent.  Therapy ordered today.  Will need therapy notes sent once available, and pt will need precert.

## 2024-03-15 NOTE — PROGRESS NOTES
INPATIENT NEPHROLOGY CONSULT PROGRESS NOTE      Patient Name: Rogerio Miller MRN: [id number]  DATE of SERVICE: March 15, 2024  TIME of SERVICE: 9:27 AM  CONSULTING SERVICE: Nephrology    REASON for CONSULT: WINSTON, right sided hydronephrosis     SUBJECTIVE:  Patient seen and evaluated at bedside.  Patient is alert and denies any complaints.  Underwent stent placement yesterday.  -Urine output has been improving but renal function remained stable with creatinine around 6.4 mg/dL.    SUMMARY OF STAY:  Mr. Miller is a 88 y.o. male who presents for eval of WINSTON detected on outpatient labs.   Found to have urinary retention requiring Yanez catheter placement.   Patient with past medical history significant for chronic kidney disease stage IIIa baseline serum creatinine 1.2 to 1.3 mg/L with EGFR of 50-55 mill per minute per 1.73 m² as of last year,  Longstanding history of diabetes mellitus, hypertension, hyperlipidemia, coronary artery disease, atrial fibrillation status post pacemaker for sick sinus syndrome, hypothyroidism.  Labs on presentation noted for acute kidney injury with creatinine up to 6.6 mg deciliter BUN of 76 and drop in GFR to 70 mill per minute per 1.73 m², hyperkalemia potassium 6.4 with mild hemolysis repeat potassium on an ABG still elevated at 5.8. Metabolic acidosis with bicarb of 20 pH of 7.38 with component of respiratory alkalosis. Hemoglobin 12.4 mg/dL close to patient baseline.     ASSESSMENT:  WINSTON on CKD IIIa: Multifactorial concern for hemodynamically mediated with bladder outlet obstruction, urinary retention requiring Yanez catheter placement. Renal ultrasound demonstrated right-sided hydronephrosis  Hyperkalemia, resolved.  Metabolic acidosis, improving.  Hypertension, optimally controlled.-    PLAN:  -Renal function has been stable with creatinine around 6.4 mg/dL without much improvement after placing stent placement.  But urine output has been  picking up, 1.8 L in the last 24 hours.  Will start on gentle hydration with normal saline at 60 cc/h for total of 1.5 L.  -Continue to monitor renal function closely.  No acute indication for renal replacement therapy at this time.  -Will start on a low potassium diet.  -Blood pressure has been stable.  Medication reviewed, renally dosed    Will follow, thank you!    Medications:    Current Facility-Administered Medications:     cefTRIAXone (Rocephin) IVPB 1 g, 1 g, intravenous, q24h, Stephen Trevizo MD, Stopped at 03/14/24 1411    citalopram (CeleXA) tablet 10 mg, 10 mg, oral, Daily, Stephen Trevizo MD, 10 mg at 03/15/24 0841    dextrose 10 % in water (D10W) infusion, 0.3 g/kg/hr, intravenous, Once PRN, Stephen Trevizo MD    dextrose 50 % injection 25 g, 25 g, intravenous, q15 min PRN, Stephen Trevizo MD    glucagon (Glucagen) injection 1 mg, 1 mg, intramuscular, q15 min PRN, Stephen Trevizo MD    heparin (porcine) injection 5,000 Units, 5,000 Units, subcutaneous, q8h KAMI, Stephen Trevizo MD, 5,000 Units at 03/15/24 0840    hydrALAZINE (Apresoline) injection 10 mg, 10 mg, intravenous, q6h PRN, Stephen Trevizo MD, 10 mg at 03/14/24 0017    insulin glargine (Lantus) injection 10 Units, 10 Units, subcutaneous, Nightly, Stephen Trevizo MD, 10 Units at 03/14/24 2117    insulin lispro (HumaLOG) injection 0-10 Units, 0-10 Units, subcutaneous, Before meals & nightly, Stephen Trevizo MD, 2 Units at 03/12/24 1806    levothyroxine (Synthroid, Levoxyl) tablet 75 mcg, 75 mcg, oral, Daily before breakfast, Stephen Trevizo MD, 75 mcg at 03/15/24 0611    melatonin tablet 5 mg, 5 mg, oral, Nightly, Stephen Trevizo MD, 5 mg at 03/14/24 2117    memantine (Namenda) tablet 10 mg, 10 mg, oral, BID, Stephen Trevizo MD, 10 mg at 03/15/24 0841    polyethylene glycol (Glycolax, Miralax) packet 17 g, 17 g, oral, Daily, Stephen Trevizo MD, 17 g at 03/15/24 0840    pramipexole (Mirapex) tablet 0.125 mg, 0.125 mg, oral, Daily, Stephen Trevizo,  MD, 0.125 mg at 03/15/24 0841    pravastatin (Pravachol) tablet 10 mg, 10 mg, oral, Nightly, Stephen Trevizo MD, 10 mg at 03/14/24 2117    scopolamine (Transderm-Scop) patch 1 patch, 1 patch, transdermal, q72h PRN, Stephen Trevizo MD, 1 patch at 03/14/24 0338    PERTINENT ROS:  GENERAL:    No fever/chills  RESPIRATORY:   Negative for cough, wheezing.  CARDIOVASCULAR:   Negative for chest pain or palpitation.  GI:  Negative for abdominal pain, diarrhea, heartburn, nausea, vomiting  : + figueroa     Physical Exam:  Vital signs in last 24 hours:  Temp:  [36 °C (96.8 °F)-36.4 °C (97.5 °F)] 36.2 °C (97.2 °F)  Heart Rate:  [60-80] 65  Resp:  [11-19] 19  BP: (123-155)/(60-75) 133/60    General: Awake, confused, poor oral intake  HEENT:  NCAT,  mucous membranes moist and pink  NECK: No JVD, no carotid bruit, supple, no cervical mass or thyromegaly  LUNGS;  Diminished breath sounds, no Rales  CV:  Distant, regular rate and rhythm, no murmurs  ABDOMEN:  abdomen soft, nontender, BS normal, no masses or organomegaly  EDEMA: No lower extremity edema/dependent edema  SKIN:  dry and normal turgor, no clubbing, cyanosis or petechia.  No rashes noted  : Figueroa catheter in place    Intake/Output last 3 shifts:  I/O last 3 completed shifts:  In: 2588 (28.5 mL/kg) [P.O.:700; I.V.:1838 (20.3 mL/kg); IV Piggyback:50]  Out: 2325 (25.6 mL/kg) [Urine:2325 (0.7 mL/kg/hr)]  Weight: 90.7 kg     DATA:  Diagnotic tests reviewed for Todays visit:  Results from last 7 days   Lab Units 03/15/24  0452   WBC AUTO x10*3/uL 8.9   RBC AUTO x10*6/uL 3.83*   HEMOGLOBIN g/dL 11.5*   HEMATOCRIT % 37.1*     Results from last 7 days   Lab Units 03/15/24  0452 03/11/24  2020 03/11/24  1622 03/11/24  1234   SODIUM mmol/L 143   < > 140  140 139   POTASSIUM mmol/L 4.7   < > 5.6*  5.6* 6.4*   CHLORIDE mmol/L 108*   < > 110*  110* 107   CO2 mmol/L 24   < > 21  21 22   BUN mg/dL 67*   < > 76*  76* 76*   CREATININE mg/dL 6.47*   < > 6.48*  6.48* 6.66*   CALCIUM  mg/dL 9.4   < > 9.5  9.5 9.8   PHOSPHORUS mg/dL  --   --  4.1  --    MAGNESIUM mg/dL 1.98   < >  --  2.33   BILIRUBIN TOTAL mg/dL  --   --   --  0.5   ALT U/L  --   --   --  10   AST U/L  --   --   --  23    < > = values in this interval not displayed.     Results from last 7 days   Lab Units 03/13/24  0245   COLOR U  Yellow   APPEARANCE U  Hazy*   PH U  8.0   SPEC GRAV UR  1.010   PROTEIN U mg/dL 30 (1+)*   BLOOD UR  MODERATE (2+)*   NITRITE U  NEGATIVE   WBC UR /HPF >50*       Us Kidney   INDINGS:  RIGHT KIDNEY:  The right kidney measures 12.8 cm  in length. The renal cortical  echogenicity and thickness are within normal limits. Hydronephrosis  is identified.. Degree is moderate      LEFT KIDNEY:  The left kidney measures 10.7 in length. The renal cortical  echogenicity and thickness are within normal limits. No  hydronephrosis is present; no evidence of nephrolithiasis.      Decompressed urinary bladder.      IMPRESSION:  Significant right-sided hydronephrosis. This is of unknown  significance. Distal obstruction should be considered. Further  evaluation is advised with CT imaging.      CXR  FINDINGS:  Cardiomegaly with pacemaker unchanged.      No consolidation, effusion, edema, or pneumothorax. Remote right rib  fracture.      IMPRESSION:  No evidence of acute intrathoracic abnormality  IMAGING: CXR reviewed in  images      SIGNATURE: Nader Dunbar MD  Nephrology and Hypertension  [address] [facility] [place]  Office phone: [phone]  FAX: [phone]    This note was partially generated using the Dragon voice recognition system, and there may be some incorrect words, spelling's and punctuation that were not noted in checking the note before saving.

## 2024-03-15 NOTE — CARE PLAN
The patient's goals for the shift include    The clinical goals for the shift include THE PT WILL AVERAGE 30ML/HR OF URINE OUTPUT, BE NON OBSTRUCTIVE, HAVE PAIN RELIEF AND BE HDS POST URETERAL STENT PLACEMENT AND KIDNEY STONE REMOVAL.

## 2024-03-15 NOTE — PROGRESS NOTES
"Rogerio Miller is a 88 y.o. male on day 4 of admission presenting with WINSTON (acute kidney injury) (CMS/[facility]).    Subjective   Traumatic figueroa removal       Objective     Physical Exam    Last Recorded Vitals  Blood pressure 150/73, pulse 68, temperature 36.2 °C (97.2 °F), resp. rate 26, height 1.778 m (5' 10\"), weight 90.7 kg (200 lb), SpO2 99 %.  Intake/Output last 3 Shifts:  I/O last 3 completed shifts:  In: 2588 (28.5 mL/kg) [P.O.:700; I.V.:1838 (20.3 mL/kg); IV Piggyback:50]  Out: 2325 (25.6 mL/kg) [Urine:2325 (0.7 mL/kg/hr)]  Weight: 90.7 kg     Relevant Results           This patient currently has cardiac telemetry ordered; if you would like to modify or discontinue the telemetry order, click here to go to the orders activity to modify/discontinue the order.                 Assessment/Plan   Principal Problem:    WINSTON (acute kidney injury) (CMS/[facility])  Active Problems:    Other hydronephrosis    Pt is confused and pulling out ivs, figueroa and anything he can get his hands on.  Will use external collecting device and hematuria should  reesolve in a day or 2.  If retention with post void of greater than 500ccs will need figueroa replaced and restraints       I spent 15 minutes in the professional and overall care of this patient.      James Gudino MD      "

## 2024-03-15 NOTE — PROGRESS NOTES
03/15/24 1033   Discharge Planning   Living Arrangements Spouse/significant other   Support Systems Spouse/significant other;Children   Assistance Needed Lives in assisted living   Type of Residence Assisted living   Do you have animals or pets at home? No   Care Facility Name [facility]   Who is requesting discharge planning? Provider   Home or Post Acute Services Post acute facilities (Rehab/SNF/etc)   Type of Post Acute Facility Services Skilled nursing   Patient expects to be discharged to: [facility]   SNF   Does the patient need discharge transport arranged? Yes   RoundTrip coordination needed? Yes   Has discharge transport been arranged? No     Spoke with daughter Nayely via phone. Patient and spouse are from [facility]. Family would like patient to go to [facility] on discharge. Will place referral to SNF and requested physician to place order for PT/OT because if accepted will need precert for insurance. TCC team to follow patient for all further discharge needs.

## 2024-03-15 NOTE — NURSING NOTE
THE PT IS VERY [place]; VERY AGITATED AND ANGRY WHEN BOTHERED. IN YOUR FACE;  ABLE TO REDIRECT IF CONFRONTED ON INAPPROPRIATE BEHAVIOR. ORIENTED X1. FOLLOWS COMMANDS INTERMITTENTLY. STRONG IN BILAT ARMS. MOVES BILAT LEGS; DENIES HAVING ANY PAIN. WOULD TAKE SIPS OF H20. MUCOSA DRY. RESPS APPEAR EVEN AND UNLABORED ON RA; EKG-VPACED. THE PTS MARSHALL IS DRAINING LIGHT PINK URINE WITH BLD SEDIMENT. NO CLOTS. FREE FLOWING. STAINED FOR STONES. THE PT DENIES ANY ABD PAIN. REFUSED DINNER. DISPOSITION NOT ANY BETTER WITH HIS DAUGHTER AT THE BEDSIDE. INITIALLY  GRUFF BUT THEN SETTLES. RESPS APPEAR EVEN AND UNLABORED. ON RA. TEMP AFEBRILE. SKIN FRAGILE AND THIN. PROTECTIVE MEPILEX ON HIS SACRUM. TURNED Q 2HRS.  0015- THE PT CONTINUES TO BE ANGRY AND IRRITABLE. NOT ALWAYS COOPERATIVE TO FOLLOW COMMANDS. DENIES HAVING ANY PAIN. FREQ STRIPS OFF CLOTHING AND EKG LEADS.  DRIFTS BACK ASLEEP. AT OTHER TIMES CALM AND DIRECTABLE. TAKING PO INTERMITTENTLY; CONFUSED. ORIENTED TO SELF ONLY. DISPOSITION VERBALLY NASTY AT TIMES.  0415- THE PT IS AWAKE; ASKING WHERE HIS WIFE IS AND WHERE SHE LIVES. HAS NO RECALL BEING IN THE HOSPITAL, TIME OR SITUATION. TAKING MORE IN PO. URINE STRAINED AND NEGATIVE FOR STONE FRAGMENTS. URINE REMAINS A LIGHT PINK IN COLOR. TRYING TO CLEAR. NO BLD OR CLOTS OBSERVED. FREE FLOWING PER HIS URINARY CATHETER. NO MEATAL DRAINAGE NOTED.THE PT DENIES ANY ABD PAIN. CONTINUES TO STODDARD X4. ; STRIP HIS CLOTHES OFF THEN SAYS HE'S COLD. VSS. TEMP AFEBRILE. RESPS EVEN AND UNLABORED ON RA. REFER TO I/O FOR TOTALS. PT SAFETY MAINTAINED. NO SEDATION GIVEN.

## 2024-03-15 NOTE — PROGRESS NOTES
"Rogerio Miller is a 88 y.o. male on day 4 of admission presenting with WINSTON (acute kidney injury) (CMS/[facility]).    Subjective   No overnight events. Pt denies pain. Initially appeared confused asking about \"putting on my shoes\" but pt was able to answer some orientation questions. Pt denies HA, chest pain, sob, n/v, abd pain.    Patient's daughter contacted this afternoon and provided with clinical update.  We reviewed that his creatinine today is 6.47 and reviewed prior values.  In addition reviewed there is no indication for urgent dialysis and potassium is normal.  Reviewed that renal started gentle hydration today.  Patient continues on ceftriaxone.  In addition daughter reported that yesterday at patient's facility, patient's wife and daughter met with hospice team and did agree to outpatient hospice.  This writer discussed with daughter that patient's current CODE STATUS is full code.  Daughter telephoned her mother on three-way and we reviewed CODE STATUS and the fact that family discussed desired DNR status yesterday after discussing with hospice team.  Per verbal consent from patient and daughter, CODE STATUS updated within epic to reflect DNR DNI, okay for ICU transfer.  In addition wife reported yesterday with family that she would not desire dialysis for patient.        Objective     Physical Exam  Constitutional: elderly, awake/alert/oriented x2 (self, birthdate and state), no distress, alert and cooperative   ENMT: mucous membranes moist   Head/Neck: Neck supple   Respiratory/Thorax: Patent airways, CTAB, on RA   Cardiovascular: Regular, rate and rhythm, no murmurs, normal S 1and S 2   Gastrointestinal: Nondistended, soft, very minimal LLQ tenderness to deep palpation, no CVA tenderness,  Yanez catheter in place with red urine noted   Musculoskeletal: ROM intact, no joint swelling, normal strength   Extremities: normal extremities, no edema   Neurological: alert and oriented x1, intact senses, motor, " "response, normal strength         Last Recorded Vitals  Blood pressure 101/68, pulse 90, temperature 35.9 °C (96.6 °F), resp. rate 21, height 1.778 m (5' 10\"), weight 90.7 kg (200 lb), SpO2 96 %.  Intake/Output last 3 Shifts:  I/O last 3 completed shifts:  In: 2588 (28.5 mL/kg) [P.O.:700; I.V.:1838 (20.3 mL/kg); IV Piggyback:50]  Out: 2325 (25.6 mL/kg) [Urine:2325 (0.7 mL/kg/hr)]  Weight: 90.7 kg     Relevant Results  Scheduled medications  cefTRIAXone, 1 g, intravenous, q24h  citalopram, 10 mg, oral, Daily  heparin (porcine), 5,000 Units, subcutaneous, q8h KAMI  insulin glargine, 10 Units, subcutaneous, Nightly  insulin lispro, 0-10 Units, subcutaneous, Before meals & nightly  levothyroxine, 75 mcg, oral, Daily before breakfast  melatonin, 5 mg, oral, Nightly  memantine, 10 mg, oral, BID  polyethylene glycol, 17 g, oral, Daily  pramipexole, 0.125 mg, oral, Daily  pravastatin, 10 mg, oral, Nightly      Continuous medications  sodium chloride 0.9%, 60 mL/hr, Last Rate: 60 mL/hr (03/15/24 1217)      PRN medications  PRN medications: dextrose 10 % in water (D10W), dextrose, glucagon, hydrALAZINE, scopolamine  Results from last 7 days   Lab Units 03/15/24  0452 03/14/24  0510 03/13/24  0638   WBC AUTO x10*3/uL 8.9 9.9 9.7   RBC AUTO x10*6/uL 3.83* 3.69* 3.51*   HEMOGLOBIN g/dL 11.5* 11.1* 10.6*       Results from last 7 days   Lab Units 03/15/24  0452 03/14/24  0510 03/13/24  0638 03/12/24  1818 03/11/24  2020 03/11/24  1622 03/11/24  1234   SODIUM mmol/L 143 140  --  135*   < > 140  140 139   POTASSIUM mmol/L 4.7 5.0  --  5.0   < > 5.6*  5.6* 6.4*   CHLORIDE mmol/L 108* 106  --  100   < > 110*  110* 107   CO2 mmol/L 24 24  --  28   < > 21  21 22   BUN mg/dL 67* 65*  --  71*   < > 76*  76* 76*   CREATININE mg/dL 6.47* 6.43*  --  6.01*   < > 6.48*  6.48* 6.66*   CALCIUM mg/dL 9.4 8.9  --  8.5*   < > 9.5  9.5 9.8   PHOSPHORUS mg/dL  --   --   --   --   --  4.1  --    MAGNESIUM mg/dL 1.98 1.93 1.88  --    < >  --  " 2.33   BILIRUBIN TOTAL mg/dL  --   --   --   --   --   --  0.5   ALT U/L  --   --   --   --   --   --  10   AST U/L  --   --   --   --   --   --  23    < > = values in this interval not displayed.       ECG 12 lead    Result Date: 3/12/2024  Electronic ventricular pacemaker When compared with ECG of 06-OCT-2022 23:43, Previous ECG has undetermined rhythm, needs review    ECG 12 lead    Result Date: 3/12/2024  Electronic ventricular pacemaker When compared with ECG of 11-MAR-2024 12:29, (unconfirmed) No significant change was found    US renal complete    Result Date: 3/11/2024  Interpreted By:  Troy Eng, STUDY: US RENAL COMPLETE;  3/11/2024 9:52 pm   INDICATION: Signs/Symptoms:WINSTON.   COMPARISON: None.   ACCESSION NUMBER(S): [id number]   ORDERING CLINICIAN: CHASTITY FUENTES   TECHNIQUE: Multiple images of the kidneys were obtained  .   FINDINGS: RIGHT KIDNEY: The right kidney measures 12.8 cm  in length. The renal cortical echogenicity and thickness are within normal limits. Hydronephrosis is identified.. Degree is moderate   LEFT KIDNEY: The left kidney measures 10.7 in length. The renal cortical echogenicity and thickness are within normal limits. No hydronephrosis is present; no evidence of nephrolithiasis.   Decompressed urinary bladder.       Significant right-sided hydronephrosis. This is of unknown significance. Distal obstruction should be considered. Further evaluation is advised with CT imaging.   MACRO: None   Signed by: Troy Eng 3/11/2024 10:29 PM Dictation workstation:   [id number]    XR chest 1 view    Result Date: 3/11/2024  Interpreted By:  Chava Pina, STUDY: XR CHEST 1 VIEW   INDICATION: Signs/Symptoms:Chest Pain.   COMPARISON: October 7, 2022   ACCESSION NUMBER(S): [id number]   ORDERING CLINICIAN: SRIKANTH VASQUEZ   FINDINGS: Cardiomegaly with pacemaker unchanged.   No consolidation, effusion, edema, or pneumothorax. Remote right rib fracture.       No evidence of acute  intrathoracic abnormality.   Signed by: Chava Pina 3/11/2024 1:27 PM Dictation workstation:   [id number]       Assessment/Plan   Principal Problem:    WINSTON (acute kidney injury) (CMS/HCC)  Active Problems:    Other hydronephrosis  Hyperkalemia, resolved  Urinary retention  Right hydronephrosis  History of Parkinson with dementia  Hypertension  Hyperlipidemia  History of CAD  on pacemaker    A/P:  Pt is an 88 year old male with PMH of Parkinsons with dementia, HTN, DLD, CAD who was admitted on 3/11 with WINSTON and weakness. Renal US showed hydronephrosis. CT showed ureteral stones. Urology consulted and pt taken to OR on 3/14 for b/l Ureteral stent placement. Renal following.      -Blood pressure stable.  -Hyperkalemia resolved status post Lokelma  -Kidney function slightly worse today 6.47 > 6.0 from 6.6 on admission, starting normal saline 60 cc/h today, nephrology following  -Renal ultrasound showed right hydronephrosis, CT scan was did show bilateral kidney stone, urology consulted and now s/p stent placement  bilaterally on 3/14  -Poor urine output expected from obstruction  -Urinary retention on admission s/p Yanez catheter placed, keep Yanez for strict I's and O's  -UA showed protein and glucose, no casts, ?? UTI started on ceftriaxone pending cx, could not assess if he is symptomatic or not.-->urine culture pending  -Telemetry  -Regular diet, encourage oral hydration  -Strict I's and O's  -Avoid nephrotoxic medication  -continue with sliding scale insulin and continue lantus, takes 25 units at night, started with 10 units  -DVT prophylaxis with heparin subcu  -GI prophylaxis not indicated    Dispo:pending PT/OT    CODE STATUS: DNR/DNI-confirmed today with wife and daughter               Bonny Martinez MD

## 2024-03-16 LAB
ALBUMIN SERPL BCP-MCNC: 3.1 G/DL (ref 3.4–5)
ANION GAP SERPL CALC-SCNC: 15 MMOL/L (ref 10–20)
BACTERIA UR CULT: ABNORMAL
BUN SERPL-MCNC: 59 MG/DL (ref 6–23)
CALCIUM SERPL-MCNC: 8.9 MG/DL (ref 8.6–10.3)
CHLORIDE SERPL-SCNC: 110 MMOL/L (ref 98–107)
CO2 SERPL-SCNC: 23 MMOL/L (ref 21–32)
CREAT SERPL-MCNC: 4.93 MG/DL (ref 0.5–1.3)
EGFRCR SERPLBLD CKD-EPI 2021: 11 ML/MIN/1.73M*2
ERYTHROCYTE [DISTWIDTH] IN BLOOD BY AUTOMATED COUNT: 12.8 % (ref 11.5–14.5)
GLUCOSE BLD MANUAL STRIP-MCNC: 100 MG/DL (ref 74–99)
GLUCOSE BLD MANUAL STRIP-MCNC: 112 MG/DL (ref 74–99)
GLUCOSE BLD MANUAL STRIP-MCNC: 119 MG/DL (ref 74–99)
GLUCOSE BLD MANUAL STRIP-MCNC: 120 MG/DL (ref 74–99)
GLUCOSE SERPL-MCNC: 145 MG/DL (ref 74–99)
HCT VFR BLD AUTO: 35.8 % (ref 41–52)
HGB BLD-MCNC: 11.2 G/DL (ref 13.5–17.5)
MAGNESIUM SERPL-MCNC: 1.85 MG/DL (ref 1.6–2.4)
MCH RBC QN AUTO: 30.2 PG (ref 26–34)
MCHC RBC AUTO-ENTMCNC: 31.3 G/DL (ref 32–36)
MCV RBC AUTO: 97 FL (ref 80–100)
NRBC BLD-RTO: 0 /100 WBCS (ref 0–0)
PHOSPHATE SERPL-MCNC: 3.9 MG/DL (ref 2.5–4.9)
PLATELET # BLD AUTO: 207 X10*3/UL (ref 150–450)
POTASSIUM SERPL-SCNC: 4 MMOL/L (ref 3.5–5.3)
RBC # BLD AUTO: 3.71 X10*6/UL (ref 4.5–5.9)
SODIUM SERPL-SCNC: 144 MMOL/L (ref 136–145)
WBC # BLD AUTO: 8.6 X10*3/UL (ref 4.4–11.3)

## 2024-03-16 PROCEDURE — 36415 COLL VENOUS BLD VENIPUNCTURE: CPT | Performed by: STUDENT IN AN ORGANIZED HEALTH CARE EDUCATION/TRAINING PROGRAM

## 2024-03-16 PROCEDURE — 2500000001 HC RX 250 WO HCPCS SELF ADMINISTERED DRUGS (ALT 637 FOR MEDICARE OP): Performed by: UROLOGY

## 2024-03-16 PROCEDURE — 82947 ASSAY GLUCOSE BLOOD QUANT: CPT

## 2024-03-16 PROCEDURE — 2500000004 HC RX 250 GENERAL PHARMACY W/ HCPCS (ALT 636 FOR OP/ED): Performed by: UROLOGY

## 2024-03-16 PROCEDURE — 80069 RENAL FUNCTION PANEL: CPT | Performed by: INTERNAL MEDICINE

## 2024-03-16 PROCEDURE — 85027 COMPLETE CBC AUTOMATED: CPT | Performed by: STUDENT IN AN ORGANIZED HEALTH CARE EDUCATION/TRAINING PROGRAM

## 2024-03-16 PROCEDURE — 83735 ASSAY OF MAGNESIUM: CPT | Performed by: STUDENT IN AN ORGANIZED HEALTH CARE EDUCATION/TRAINING PROGRAM

## 2024-03-16 PROCEDURE — 2060000001 HC INTERMEDIATE ICU ROOM DAILY

## 2024-03-16 PROCEDURE — 99233 SBSQ HOSP IP/OBS HIGH 50: CPT | Performed by: STUDENT IN AN ORGANIZED HEALTH CARE EDUCATION/TRAINING PROGRAM

## 2024-03-16 PROCEDURE — 2500000004 HC RX 250 GENERAL PHARMACY W/ HCPCS (ALT 636 FOR OP/ED): Performed by: INTERNAL MEDICINE

## 2024-03-16 PROCEDURE — 51701 INSERT BLADDER CATHETER: CPT

## 2024-03-16 PROCEDURE — 51702 INSERT TEMP BLADDER CATH: CPT

## 2024-03-16 RX ORDER — LANOLIN ALCOHOL/MO/W.PET/CERES
400 CREAM (GRAM) TOPICAL ONCE
Status: COMPLETED | OUTPATIENT
Start: 2024-03-16 | End: 2024-03-17

## 2024-03-16 RX ADMIN — ZIPRASIDONE MESYLATE 20 MG: 20 INJECTION, POWDER, LYOPHILIZED, FOR SOLUTION INTRAMUSCULAR at 01:02

## 2024-03-16 RX ADMIN — MEMANTINE 10 MG: 10 TABLET ORAL at 22:25

## 2024-03-16 RX ADMIN — SODIUM CHLORIDE 60 ML/HR: 9 INJECTION, SOLUTION INTRAVENOUS at 04:05

## 2024-03-16 RX ADMIN — CEFTRIAXONE SODIUM 1 G: 1 INJECTION, SOLUTION INTRAVENOUS at 14:34

## 2024-03-16 RX ADMIN — Medication 5 MG: at 22:25

## 2024-03-16 RX ADMIN — PRAVASTATIN SODIUM 10 MG: 20 TABLET ORAL at 22:25

## 2024-03-16 ASSESSMENT — COGNITIVE AND FUNCTIONAL STATUS - GENERAL
TURNING FROM BACK TO SIDE WHILE IN FLAT BAD: A LITTLE
WALKING IN HOSPITAL ROOM: TOTAL
DRESSING REGULAR LOWER BODY CLOTHING: A LOT
PERSONAL GROOMING: A LITTLE
DAILY ACTIVITIY SCORE: 15
EATING MEALS: A LITTLE
STANDING UP FROM CHAIR USING ARMS: A LOT
MOBILITY SCORE: 14
MOVING TO AND FROM BED TO CHAIR: A LITTLE
TOILETING: A LOT
CLIMB 3 TO 5 STEPS WITH RAILING: TOTAL
HELP NEEDED FOR BATHING: A LOT
DRESSING REGULAR UPPER BODY CLOTHING: A LITTLE

## 2024-03-16 ASSESSMENT — PAIN SCALES - GENERAL
PAINLEVEL_OUTOF10: 0 - NO PAIN

## 2024-03-16 ASSESSMENT — PAIN - FUNCTIONAL ASSESSMENT
PAIN_FUNCTIONAL_ASSESSMENT: 0-10
PAIN_FUNCTIONAL_ASSESSMENT: 0-10
PAIN_FUNCTIONAL_ASSESSMENT: PAINAD (PAIN ASSESSMENT IN ADVANCED DEMENTIA SCALE)
PAIN_FUNCTIONAL_ASSESSMENT: 0-10
PAIN_FUNCTIONAL_ASSESSMENT: PAINAD (PAIN ASSESSMENT IN ADVANCED DEMENTIA SCALE)
PAIN_FUNCTIONAL_ASSESSMENT: PAINAD (PAIN ASSESSMENT IN ADVANCED DEMENTIA SCALE)

## 2024-03-16 ASSESSMENT — PAIN SCALES - PAIN ASSESSMENT IN ADVANCED DEMENTIA (PAINAD)
BREATHING: NORMAL
BODYLANGUAGE: RELAXED
TOTALSCORE: 0
TOTALSCORE: 0
BREATHING: NORMAL
BODYLANGUAGE: RELAXED
TOTALSCORE: REPOSITIONED
CONSOLABILITY: NO NEED TO CONSOLE
CONSOLABILITY: NO NEED TO CONSOLE
FACIALEXPRESSION: SMILING OR INEXPRESSIVE
FACIALEXPRESSION: SMILING OR INEXPRESSIVE
TOTALSCORE: REPOSITIONED

## 2024-03-16 ASSESSMENT — PAIN SCALES - WONG BAKER
WONGBAKER_NUMERICALRESPONSE: NO HURT

## 2024-03-16 NOTE — PROGRESS NOTES
"Rogerio Miller is a 88 y.o. male on day 5 of admission presenting with WINSTON (acute kidney injury) (CMS/Formerly [facility]).    Subjective   Acute agitation overnight. Pt removed figueroa. Serial bladder scans performed and pt required straight cath x3. In addition, pt required geodon overnight for agitation which did help per RN. This AM pt remains sedated after geodon. Resting comfortably. Does awaken to gentle shaking, however quickly falls back asleep. VSS.    Daughter telephoned and updated regarding overnight events.  Discussed with daughter that patient remains sedated this morning and that goal is to limit use of sedating medications.  Discussed that Figueroa will be replaced this a.m. given the fact that he required multiple rounds of straight cath.  Discussed patient will likely need to be discharged with Figueroa. urology continues to follow.  Discussed patient is at risk for hospital-acquired delirium.  Daughter will pass on clinical update to her mother.        Objective     Physical Exam  Constitutional: elderly, somnolent this AM, no distress, appears comfortable while sleeping   ENMT: mucous membranes moist   Head/Neck: Neck supple   Respiratory/Thorax: Patent airways, CTAB, on RA, respirations even/nonlabored   Cardiovascular: Regular, rate and rhythm, no murmurs, normal S 1and S 2   Gastrointestinal: Nondistended, soft, nontender, no guarding nor rebound   Musculoskeletal: ROM intact, no joint swelling, normal strength   Extremities: normal extremities, no edema   Neurological: lethargic this AM, intact senses,normal strength         Last Recorded Vitals  Blood pressure 107/67, pulse 68, temperature 36.2 °C (97.2 °F), temperature source Temporal, resp. rate 23, height 1.778 m (5' 10\"), weight 76.8 kg (169 lb 5 oz), SpO2 96 %.  Intake/Output last 3 Shifts:  I/O last 3 completed shifts:  In: 2095 (27.3 mL/kg) [P.O.:1290; I.V.:805 (10.5 mL/kg)]  Out: 4225 (55 mL/kg) [Urine:4225 (1.5 mL/kg/hr)]  Weight: 76.8 kg     Relevant " Results  Scheduled medications  cefTRIAXone, 1 g, intravenous, q24h  citalopram, 10 mg, oral, Daily  heparin (porcine), 5,000 Units, subcutaneous, q8h KAMI  insulin glargine, 10 Units, subcutaneous, Nightly  insulin lispro, 0-10 Units, subcutaneous, Before meals & nightly  levothyroxine, 75 mcg, oral, Daily before breakfast  magnesium oxide, 400 mg, oral, Once  melatonin, 5 mg, oral, Nightly  memantine, 10 mg, oral, BID  polyethylene glycol, 17 g, oral, Daily  pramipexole, 0.125 mg, oral, Daily  pravastatin, 10 mg, oral, Nightly      Continuous medications  sodium chloride 0.9%, 60 mL/hr, Last Rate: 60 mL/hr (03/16/24 0405)      PRN medications  PRN medications: dextrose 10 % in water (D10W), dextrose, glucagon, hydrALAZINE, scopolamine  Results from last 7 days   Lab Units 03/16/24  0433 03/15/24  0452 03/14/24  0510   WBC AUTO x10*3/uL 8.6 8.9 9.9   RBC AUTO x10*6/uL 3.71* 3.83* 3.69*   HEMOGLOBIN g/dL 11.2* 11.5* 11.1*       Results from last 7 days   Lab Units 03/16/24  0433 03/15/24  0452 03/14/24  0510 03/11/24  2020 03/11/24  1622 03/11/24  1234   SODIUM mmol/L 144 143 140   < > 140  140 139   POTASSIUM mmol/L 4.0 4.7 5.0   < > 5.6*  5.6* 6.4*   CHLORIDE mmol/L 110* 108* 106   < > 110*  110* 107   CO2 mmol/L 23 24 24   < > 21  21 22   BUN mg/dL 59* 67* 65*   < > 76*  76* 76*   CREATININE mg/dL 4.93* 6.47* 6.43*   < > 6.48*  6.48* 6.66*   CALCIUM mg/dL 8.9 9.4 8.9   < > 9.5  9.5 9.8   PHOSPHORUS mg/dL 3.9  --   --   --  4.1  --    MAGNESIUM mg/dL 1.85 1.98 1.93   < >  --  2.33   BILIRUBIN TOTAL mg/dL  --   --   --   --   --  0.5   ALT U/L  --   --   --   --   --  10   AST U/L  --   --   --   --   --  23    < > = values in this interval not displayed.       ECG 12 lead    Result Date: 3/12/2024  Electronic ventricular pacemaker When compared with ECG of 06-OCT-2022 23:43, Previous ECG has undetermined rhythm, needs review    ECG 12 lead    Result Date: 3/12/2024  Electronic ventricular pacemaker When  compared with ECG of 11-MAR-2024 12:29, (unconfirmed) No significant change was found    US renal complete    Result Date: 3/11/2024  Interpreted By:  Troy Eng, STUDY: US RENAL COMPLETE;  3/11/2024 9:52 pm   INDICATION: Signs/Symptoms:WINSTON.   COMPARISON: None.   ACCESSION NUMBER(S): [id number]   ORDERING CLINICIAN: CHASTITY FUENTES   TECHNIQUE: Multiple images of the kidneys were obtained  .   FINDINGS: RIGHT KIDNEY: The right kidney measures 12.8 cm  in length. The renal cortical echogenicity and thickness are within normal limits. Hydronephrosis is identified.. Degree is moderate   LEFT KIDNEY: The left kidney measures 10.7 in length. The renal cortical echogenicity and thickness are within normal limits. No hydronephrosis is present; no evidence of nephrolithiasis.   Decompressed urinary bladder.       Significant right-sided hydronephrosis. This is of unknown significance. Distal obstruction should be considered. Further evaluation is advised with CT imaging.   MACRO: None   Signed by: Troy Eng 3/11/2024 10:29 PM Dictation workstation:   [id number]    XR chest 1 view    Result Date: 3/11/2024  Interpreted By:  Chava Pina, STUDY: XR CHEST 1 VIEW   INDICATION: Signs/Symptoms:Chest Pain.   COMPARISON: October 7, 2022   ACCESSION NUMBER(S): [id number]   ORDERING CLINICIAN: SRIKANTH VASQUEZ   FINDINGS: Cardiomegaly with pacemaker unchanged.   No consolidation, effusion, edema, or pneumothorax. Remote right rib fracture.       No evidence of acute intrathoracic abnormality.   Signed by: Chava Pina 3/11/2024 1:27 PM Dictation workstation:   [id number]       Assessment/Plan   Principal Problem:    WINSTON (acute kidney injury) (CMS/HCC)  Active Problems:    Other hydronephrosis  Hyperkalemia, resolved  Urinary retention  Right hydronephrosis  History of Parkinson with dementia  Hypertension  Hyperlipidemia  History of CAD  on pacemaker    A/P:  Pt is an 88 year old male with PMH of Parkinsons with  dementia, HTN, DLD, CAD who was admitted on 3/11 with WINSTON and weakness. Renal US showed hydronephrosis. CT showed ureteral stones. Urology consulted and pt taken to OR on 3/14 for b/l Ureteral stent placement. Renal following. Acute agitation and urinary retention overnight.     -Blood pressure stable.  -Hyperkalemia resolved status post Lokelma  -Kidney function slightly worse on  3/15 to 6.47 > 6.0 from 6.6 on admission, started on normal saline 60 cc/h, nephrology following-->creatinine improving today to 4.93  -Renal ultrasound showed right hydronephrosis, CT scan was did show bilateral kidney stone, urology consulted and now s/p stent placement  bilaterally on 3/14  -Poor urine output expected from obstruction  -Urinary retention on admission s/p Figueroa catheter placed, keep Figueroa for strict I's and O's-->figueroa was removed by patient on 3/15 in the setting of agitation. Pt underwent straight cath x3 overnight, will replace figueroa this AM  -pt required geodon overnight for agitation. Closely monitor today as pt remains lethargic this AM  -UA showed protein and glucose, no casts, ?? UTI started on ceftriaxone pending cx, could not assess if he is symptomatic or not.-->urine culture pending  -Telemetry  -Regular diet, encourage oral hydration  -Strict I's and O's  -Avoid nephrotoxic medication  -continue with sliding scale insulin and continue lantus, takes 25 units at night, started with 10 units  -DVT prophylaxis with heparin subcu  -GI prophylaxis not indicated    Dispo:pending PT/OT    Pt's family updated via telephone today    CODE STATUS: DNR/DNI-confirmed 3/15 with wife and daughter               Bonny Martinez MD

## 2024-03-16 NOTE — NURSING NOTE
Yanez catheter 16F 10 ml balloon inserted per  Order for acute retention. Patient had 550 ml blood tinged urine return with no clots noted. Patient straight cathed last 4 times after bladder scanned, patient unable to urinate. Patient tolerated insertion, no difficulty.

## 2024-03-16 NOTE — PROGRESS NOTES
INPATIENT NEPHROLOGY CONSULT PROGRESS NOTE      Patient Name: Rogerio Miller MRN: [id number]  DATE of SERVICE: March 16, 2024  TIME of SERVICE: 12:39 PM  CONSULTING SERVICE: Nephrology    REASON for CONSULT: WINSTON, right sided hydronephrosis     SUBJECTIVE:  Patient seen and evaluated at bedside.  Patient is alert and denies any complaints.  Underwent stent placement yesterday.  -Urine output has been improving but renal function remained stable with creatinine around 6.4 mg/dL.    SUMMARY OF STAY:  Mr. Miller is a 88 y.o. male who presents for eval of WINSTON detected on outpatient labs.   Found to have urinary retention requiring Yanez catheter placement.   Patient with past medical history significant for chronic kidney disease stage IIIa baseline serum creatinine 1.2 to 1.3 mg/L with EGFR of 50-55 mill per minute per 1.73 m² as of last year,  Longstanding history of diabetes mellitus, hypertension, hyperlipidemia, coronary artery disease, atrial fibrillation status post pacemaker for sick sinus syndrome, hypothyroidism.  Labs on presentation noted for acute kidney injury with creatinine up to 6.6 mg deciliter BUN of 76 and drop in GFR to 70 mill per minute per 1.73 m², hyperkalemia potassium 6.4 with mild hemolysis repeat potassium on an ABG still elevated at 5.8. Metabolic acidosis with bicarb of 20 pH of 7.38 with component of respiratory alkalosis. Hemoglobin 12.4 mg/dL close to patient baseline.     ASSESSMENT:  WINSTON on CKD IIIa: Multifactorial concern for hemodynamically mediated with bladder outlet obstruction, urinary retention requiring Yanez catheter placement. Renal ultrasound demonstrated right-sided hydronephrosis  Hyperkalemia, resolved.  Metabolic acidosis, improving.  Hypertension, optimally controlled.-    PLAN:  -Renal function has been improving, creatinine is down to 4.9 mg/dL from 6.4 mg/dL and has been nonoliguric with urine output of 4 3.1 L in the  last 24 hours remains under significant for net 1.4 L negative.  Will continue IV hydration with normal saline at 60 cc/h for another 1.5 L.  -Has been retaining urine and had straight catheterization.  Will check for bladder scan and if patient continues to have a urinary retention will consider Yanez catheter placement.  Discussed with the nursing staff.  -Patient remains lethargic.  -Will continue to monitor renal function closely.  No acute indication for renal replacement therapy.  -Lites have been better.  Serum potassium is down to 4.0.  -Bicarbonate is stable around 43.  -Hypertension: Blood pressure has been improving, systolic around 100s tens to 120s.  -Discussed with the daughter Janelle over the phone.    Will follow, thank you!    Medications:    Current Facility-Administered Medications:     cefTRIAXone (Rocephin) IVPB 1 g, 1 g, intravenous, q24h, Stephen Trevizo MD, Stopped at 03/15/24 1317    citalopram (CeleXA) tablet 10 mg, 10 mg, oral, Daily, Stephen Trevizo MD, 10 mg at 03/15/24 0841    dextrose 10 % in water (D10W) infusion, 0.3 g/kg/hr, intravenous, Once PRN, Stephen Trevizo MD    dextrose 50 % injection 25 g, 25 g, intravenous, q15 min PRN, Stephen Trevizo MD    glucagon (Glucagen) injection 1 mg, 1 mg, intramuscular, q15 min PRN, Stephen Trevizo MD    heparin (porcine) injection 5,000 Units, 5,000 Units, subcutaneous, q8h KAMI, Stephen Trevizo MD, 5,000 Units at 03/15/24 0840    hydrALAZINE (Apresoline) injection 10 mg, 10 mg, intravenous, q6h PRN, Stephen Trevizo MD, 10 mg at 03/14/24 0017    insulin glargine (Lantus) injection 10 Units, 10 Units, subcutaneous, Nightly, Stephen Trevizo MD, 10 Units at 03/15/24 2226    insulin lispro (HumaLOG) injection 0-10 Units, 0-10 Units, subcutaneous, Before meals & nightly, Stephen Trevizo MD, 2 Units at 03/12/24 1806    levothyroxine (Synthroid, Levoxyl) tablet 75 mcg, 75 mcg, oral, Daily before breakfast, Stephen Trevizo MD, 75 mcg at 03/15/24 0611     magnesium oxide (Mag-Ox) tablet 400 mg, 400 mg, oral, Once, Bonny Martinez MD    melatonin tablet 5 mg, 5 mg, oral, Nightly, Stephen Trevizo MD, 5 mg at 03/15/24 2225    memantine (Namenda) tablet 10 mg, 10 mg, oral, BID, Stephen Trevizo MD, 10 mg at 03/15/24 2226    polyethylene glycol (Glycolax, Miralax) packet 17 g, 17 g, oral, Daily, Stephen Trevizo MD, 17 g at 03/15/24 0840    pramipexole (Mirapex) tablet 0.125 mg, 0.125 mg, oral, Daily, Stephen Trevizo MD, 0.125 mg at 03/15/24 0841    pravastatin (Pravachol) tablet 10 mg, 10 mg, oral, Nightly, Stephen Trevizo MD, 10 mg at 03/15/24 2226    scopolamine (Transderm-Scop) patch 1 patch, 1 patch, transdermal, q72h PRN, Stephen Trevizo MD, 1 patch at 03/14/24 0338    sodium chloride 0.9% infusion, 60 mL/hr, intravenous, Continuous, Nader Dunbar MD, Last Rate: 60 mL/hr at 03/16/24 0405, 60 mL/hr at 03/16/24 0405    PERTINENT ROS:  Unable to gather.  Physical Exam:  Vital signs in last 24 hours:  Temp:  [36 °C (96.8 °F)-36.7 °C (98.1 °F)] 36.2 °C (97.2 °F)  Heart Rate:  [] 68  Resp:  [15-26] 23  BP: (107-144)/(67-98) 107/67    General: Has been lethargic.  HEENT:  NCAT,  mucous membranes moist and pink  NECK: No JVD, no carotid bruit, supple, no cervical mass or thyromegaly  LUNGS;  Diminished breath sounds, no Rales  CV:  Distant, regular rate and rhythm, no murmurs  ABDOMEN:  abdomen soft, nontender, BS normal, no masses or organomegaly  EDEMA: No lower extremity edema/dependent edema  SKIN:  dry and normal turgor, no clubbing, cyanosis or petechia.  No rashes noted    Intake/Output last 3 shifts:  I/O last 3 completed shifts:  In: 2095 (27.3 mL/kg) [P.O.:1290; I.V.:805 (10.5 mL/kg)]  Out: 4225 (55 mL/kg) [Urine:4225 (1.5 mL/kg/hr)]  Weight: 76.8 kg     DATA:  Diagnotic tests reviewed for Todays visit:  Results from last 7 days   Lab Units 03/16/24  0433   WBC AUTO x10*3/uL 8.6   RBC AUTO x10*6/uL 3.71*   HEMOGLOBIN g/dL 11.2*   HEMATOCRIT % 35.8*      Results from last 7 days   Lab Units 03/16/24  0433 03/11/24  1622 03/11/24  1234   SODIUM mmol/L 144   < > 139   POTASSIUM mmol/L 4.0   < > 6.4*   CHLORIDE mmol/L 110*   < > 107   CO2 mmol/L 23   < > 22   BUN mg/dL 59*   < > 76*   CREATININE mg/dL 4.93*   < > 6.66*   CALCIUM mg/dL 8.9   < > 9.8   PHOSPHORUS mg/dL 3.9   < >  --    MAGNESIUM mg/dL 1.85   < > 2.33   BILIRUBIN TOTAL mg/dL  --   --  0.5   ALT U/L  --   --  10   AST U/L  --   --  23    < > = values in this interval not displayed.     Results from last 7 days   Lab Units 03/13/24  0245   COLOR U  Yellow   APPEARANCE U  Hazy*   PH U  8.0   SPEC GRAV UR  1.010   PROTEIN U mg/dL 30 (1+)*   BLOOD UR  MODERATE (2+)*   NITRITE U  NEGATIVE   WBC UR /HPF >50*       Us Kidney   INDINGS:  RIGHT KIDNEY:  The right kidney measures 12.8 cm  in length. The renal cortical  echogenicity and thickness are within normal limits. Hydronephrosis  is identified.. Degree is moderate      LEFT KIDNEY:  The left kidney measures 10.7 in length. The renal cortical  echogenicity and thickness are within normal limits. No  hydronephrosis is present; no evidence of nephrolithiasis.      Decompressed urinary bladder.      IMPRESSION:  Significant right-sided hydronephrosis. This is of unknown  significance. Distal obstruction should be considered. Further  evaluation is advised with CT imaging.      CXR  FINDINGS:  Cardiomegaly with pacemaker unchanged.      No consolidation, effusion, edema, or pneumothorax. Remote right rib  fracture.      IMPRESSION:  No evidence of acute intrathoracic abnormality  IMAGING: CXR reviewed in  images      SIGNATURE: Nader Dunbar MD  Nephrology and Hypertension  [address] [place]  Office phone: [phone]  FAX: [phone]    This note was partially generated using the Dragon voice recognition system, and there may be some incorrect words, spelling's and punctuation that were not noted in checking the note before  saving.

## 2024-03-16 NOTE — CARE PLAN
The patient's goals for the shift include      The clinical goals for the shift include see poc    Over the shift, the patient did not make progress toward the following goals. Barriers to progression include ***. Recommendations to address these barriers include ***.

## 2024-03-16 NOTE — CARE PLAN
The patient's goals for the shift include  MARLYN    The clinical goals for the shift include THE PT WILL BE ABLE TO URINATE AND NOT HAVE ANY SIGNS OF BLEEDING WHILE REMAINING HEMODYNAMIC STABILITY , NO DROP IN HGB  AND OR VS

## 2024-03-16 NOTE — CARE PLAN
The patient's goals for the shift include      The clinical goals for the shift include urine will be clear.    Goal not met. Urine remained pink during most of this shift. Pt. Pulled out his figueroa causing small amounts of bleeding and red urine. Family was updated. Per Shahab to leave out figueroa and bladder scan in a few hours. Bladder scan showed 520ml. Straight cath orders obtain. Pt. Remains confused

## 2024-03-16 NOTE — NURSING NOTE
"THE PT APPEARS ORIENTED X2 TONITE. HAS A VERY LOW TOLERANCE FOR STAFF. STILL VERY CONFUSED. MENTATION WAXES AND WANES. ONE MINUTE CALM AND COOPERATIVE THE NEXT ANGRY, THREATENING TO PUNCH STAFF IN THE FACE; ATTEMPTING TO GET OOB. LOOKING FOR HIS WIFE. AT TIMES DIRECTABLE BUT AT OTHERS NOT. GETS RIGHT IN YOUR FACE.\" I DONT CARE WHAT YOU WANT.\"  HAD A DEATH  PULLING ON THE MONITOR CORDS WITHIN HIS REACH. WERE TAPED TO THE WALL OUT OF REACH.THE PT WAS STRAIGHT CATHED FOR 550ML OF A LIGHT PINK URINE. NO CLOTS. THE PT HAD STATED HE HAD THE URGE TO GO BUT COULDN'T. THE PT ALSO HAD SMALL  DROPLETS OF BRIGHT RED BLDFROM HIS MEATUS UPON COMPLETITION. THE PT TOLERATED THE PROCEDURE WELL.  STERILITY WAS MAINTAINED..HAD NO AGITATION ASSOCIATED WITH IT.  THE PT WAS GIVEN A COMPLETE CHG BATH TO MAKE COMFORTABLE. LINENS WERE CHANGED. THE PT CONTINUES TO LOG ROLL WELL S-S; IS FEARFUL OF FALLING. EKG- VPACED WITH OCC PVCS. THE PT HAS IVF INFUSING @ 60 ML/HR. THE HEPLOCK SITE WAS REDRESSED AND WRAPPED IN KERLEX. AGAIN THE PTS DISPOSITION IS VERY LABILE. FROM MOMENT TO MOMENT. RESPS APPEAR EVEN AND UNLABORED ON RA.  2330 THE PT IS WILD AGAIN. THE HOSPITALIST WAS NOTIFIED. AN EKG WAS OBTAINED. . THE PT WAS RE- BLADDER SCANNED  ML. TEACHING NOTIFIED.  0000 THE PT WAS STRAIGHT CATHED FOR 550ML AND ALSO HAD A LG CLOT INITIALLY WHEN FIRST DRAINED. AGAIN HAS SOME MEATAL OOZING UPON COMPLETITION. THE PT DID NOT LIKE THE PROCEDURE BUT TOLERATED. IT. STERILITY MAINTAINED. Subcutaneous HEPARIN HELD.  0100- THE PT IS TRYING TO CLIMB OUT OF BED AGAIN. CONFUSED. HAS THINGS TO DO. SWINGING AT STAFF. TELLING US WE COULD GO TO HELL. LEGS OVER THE SIDE OF THE BED. TRYING TO HIT. PULLING AT IV TUBING AND EKG LEADS. NOT COOPERATING. ATTEMPTED TO REORIENT W/O SUCCESS. GEODON IM WAS GIVEN X1.  0200- THE PT IS SLEEPING. SNORING RESPS; 2 L NC APPLIED 0430- THE PT APPEARS CALM AND RESTFUL. SLEEPING WELL. NO FURTHER THREATENING OUTBURSTS. " THE PT WAS BLADDER SCANNED  ML. WAS THEN STRAIGHT CATHED FOR 650ML + 75 ML IN THE EXT. PURWICK CANNISTER. THE PTS URINE WAS ONLY A LIGHT SHADE OF PINK. NO CLOTS. NO MEATAL OOZING OBSERVED.  STERILE TECHNIQUE WAS PERFORMED. THE PT AROUSED MOMENTARILY BUT THEN WENT BACK TO SLEEP. VSS. TEMP AFEBRILE. RESPS EVEN AND UNLABORED. PT SAFETY WAS MAINTAINED.  0700-THE PT CONTINUES TO BE SLEEPY AND GROGGY BUT WILL MOMENTARILY WAKE UP TO PULL AWAY THEN GO BACK TO SLEEP. APPEARS CALM AND RESTFUL, RESPS EVEN AND UNLABORED. VSS.

## 2024-03-17 LAB
ANION GAP SERPL CALC-SCNC: 17 MMOL/L (ref 10–20)
BUN SERPL-MCNC: 53 MG/DL (ref 6–23)
CALCIUM SERPL-MCNC: 9.2 MG/DL (ref 8.6–10.3)
CHLORIDE SERPL-SCNC: 113 MMOL/L (ref 98–107)
CO2 SERPL-SCNC: 21 MMOL/L (ref 21–32)
CREAT SERPL-MCNC: 3.9 MG/DL (ref 0.5–1.3)
EGFRCR SERPLBLD CKD-EPI 2021: 14 ML/MIN/1.73M*2
ERYTHROCYTE [DISTWIDTH] IN BLOOD BY AUTOMATED COUNT: 13 % (ref 11.5–14.5)
GLUCOSE BLD MANUAL STRIP-MCNC: 101 MG/DL (ref 74–99)
GLUCOSE BLD MANUAL STRIP-MCNC: 172 MG/DL (ref 74–99)
GLUCOSE BLD MANUAL STRIP-MCNC: 238 MG/DL (ref 74–99)
GLUCOSE BLD MANUAL STRIP-MCNC: 296 MG/DL (ref 74–99)
GLUCOSE SERPL-MCNC: 106 MG/DL (ref 74–99)
HCT VFR BLD AUTO: 37.8 % (ref 41–52)
HGB BLD-MCNC: 11.7 G/DL (ref 13.5–17.5)
MAGNESIUM SERPL-MCNC: 1.75 MG/DL (ref 1.6–2.4)
MCH RBC QN AUTO: 30.2 PG (ref 26–34)
MCHC RBC AUTO-ENTMCNC: 31 G/DL (ref 32–36)
MCV RBC AUTO: 97 FL (ref 80–100)
NRBC BLD-RTO: 0 /100 WBCS (ref 0–0)
PLATELET # BLD AUTO: 225 X10*3/UL (ref 150–450)
POTASSIUM SERPL-SCNC: 3.9 MMOL/L (ref 3.5–5.3)
RBC # BLD AUTO: 3.88 X10*6/UL (ref 4.5–5.9)
SODIUM SERPL-SCNC: 147 MMOL/L (ref 136–145)
WBC # BLD AUTO: 10.2 X10*3/UL (ref 4.4–11.3)

## 2024-03-17 PROCEDURE — 2500000004 HC RX 250 GENERAL PHARMACY W/ HCPCS (ALT 636 FOR OP/ED): Performed by: STUDENT IN AN ORGANIZED HEALTH CARE EDUCATION/TRAINING PROGRAM

## 2024-03-17 PROCEDURE — 99233 SBSQ HOSP IP/OBS HIGH 50: CPT | Performed by: STUDENT IN AN ORGANIZED HEALTH CARE EDUCATION/TRAINING PROGRAM

## 2024-03-17 PROCEDURE — 80048 BASIC METABOLIC PNL TOTAL CA: CPT | Performed by: STUDENT IN AN ORGANIZED HEALTH CARE EDUCATION/TRAINING PROGRAM

## 2024-03-17 PROCEDURE — 82947 ASSAY GLUCOSE BLOOD QUANT: CPT

## 2024-03-17 PROCEDURE — 2500000004 HC RX 250 GENERAL PHARMACY W/ HCPCS (ALT 636 FOR OP/ED): Performed by: UROLOGY

## 2024-03-17 PROCEDURE — 2500000002 HC RX 250 W HCPCS SELF ADMINISTERED DRUGS (ALT 637 FOR MEDICARE OP, ALT 636 FOR OP/ED): Performed by: UROLOGY

## 2024-03-17 PROCEDURE — 2500000004 HC RX 250 GENERAL PHARMACY W/ HCPCS (ALT 636 FOR OP/ED): Performed by: INTERNAL MEDICINE

## 2024-03-17 PROCEDURE — 2500000001 HC RX 250 WO HCPCS SELF ADMINISTERED DRUGS (ALT 637 FOR MEDICARE OP): Performed by: UROLOGY

## 2024-03-17 PROCEDURE — 83735 ASSAY OF MAGNESIUM: CPT | Performed by: STUDENT IN AN ORGANIZED HEALTH CARE EDUCATION/TRAINING PROGRAM

## 2024-03-17 PROCEDURE — 85027 COMPLETE CBC AUTOMATED: CPT | Performed by: STUDENT IN AN ORGANIZED HEALTH CARE EDUCATION/TRAINING PROGRAM

## 2024-03-17 PROCEDURE — 1200000002 HC GENERAL ROOM WITH TELEMETRY DAILY

## 2024-03-17 PROCEDURE — 2500000001 HC RX 250 WO HCPCS SELF ADMINISTERED DRUGS (ALT 637 FOR MEDICARE OP): Performed by: STUDENT IN AN ORGANIZED HEALTH CARE EDUCATION/TRAINING PROGRAM

## 2024-03-17 PROCEDURE — 36415 COLL VENOUS BLD VENIPUNCTURE: CPT | Performed by: STUDENT IN AN ORGANIZED HEALTH CARE EDUCATION/TRAINING PROGRAM

## 2024-03-17 RX ORDER — AMOXICILLIN AND CLAVULANATE POTASSIUM 250; 125 MG/1; MG/1
1 TABLET, FILM COATED ORAL EVERY 12 HOURS SCHEDULED
Status: DISCONTINUED | OUTPATIENT
Start: 2024-03-17 | End: 2024-03-18

## 2024-03-17 RX ORDER — DEXTROSE MONOHYDRATE AND SODIUM CHLORIDE 5; .45 G/100ML; G/100ML
60 INJECTION, SOLUTION INTRAVENOUS CONTINUOUS
Status: DISCONTINUED | OUTPATIENT
Start: 2024-03-17 | End: 2024-03-18

## 2024-03-17 RX ADMIN — CITALOPRAM HYDROBROMIDE 10 MG: 10 TABLET ORAL at 09:46

## 2024-03-17 RX ADMIN — Medication 400 MG: at 09:46

## 2024-03-17 RX ADMIN — PRAMIPEXOLE DIHYDROCHLORIDE 0.12 MG: 0.25 TABLET ORAL at 09:46

## 2024-03-17 RX ADMIN — INSULIN LISPRO 6 UNITS: 100 INJECTION, SOLUTION INTRAVENOUS; SUBCUTANEOUS at 17:11

## 2024-03-17 RX ADMIN — INSULIN GLARGINE 10 UNITS: 100 INJECTION, SOLUTION SUBCUTANEOUS at 20:30

## 2024-03-17 RX ADMIN — AMOXICILLIN AND CLAVULANATE POTASSIUM 1 TABLET: 250; 125 TABLET, FILM COATED ORAL at 20:28

## 2024-03-17 RX ADMIN — HEPARIN SODIUM 5000 UNITS: 5000 INJECTION INTRAVENOUS; SUBCUTANEOUS at 23:08

## 2024-03-17 RX ADMIN — AMOXICILLIN AND CLAVULANATE POTASSIUM 1 TABLET: 250; 125 TABLET, FILM COATED ORAL at 14:33

## 2024-03-17 RX ADMIN — INSULIN LISPRO 2 UNITS: 100 INJECTION, SOLUTION INTRAVENOUS; SUBCUTANEOUS at 12:25

## 2024-03-17 RX ADMIN — MEMANTINE 10 MG: 10 TABLET ORAL at 20:31

## 2024-03-17 RX ADMIN — HEPARIN SODIUM 5000 UNITS: 5000 INJECTION INTRAVENOUS; SUBCUTANEOUS at 01:10

## 2024-03-17 RX ADMIN — HEPARIN SODIUM 5000 UNITS: 5000 INJECTION INTRAVENOUS; SUBCUTANEOUS at 17:48

## 2024-03-17 RX ADMIN — PRAVASTATIN SODIUM 10 MG: 20 TABLET ORAL at 20:28

## 2024-03-17 RX ADMIN — MEMANTINE 10 MG: 10 TABLET ORAL at 09:46

## 2024-03-17 RX ADMIN — HEPARIN SODIUM 5000 UNITS: 5000 INJECTION INTRAVENOUS; SUBCUTANEOUS at 09:47

## 2024-03-17 RX ADMIN — INSULIN LISPRO 6 UNITS: 100 INJECTION, SOLUTION INTRAVENOUS; SUBCUTANEOUS at 22:16

## 2024-03-17 RX ADMIN — DEXTROSE AND SODIUM CHLORIDE 60 ML/HR: 5; 450 INJECTION, SOLUTION INTRAVENOUS at 12:24

## 2024-03-17 RX ADMIN — LEVOTHYROXINE SODIUM 75 MCG: 75 TABLET ORAL at 06:22

## 2024-03-17 RX ADMIN — Medication 5 MG: at 20:31

## 2024-03-17 ASSESSMENT — COGNITIVE AND FUNCTIONAL STATUS - GENERAL
DAILY ACTIVITIY SCORE: 13
PERSONAL GROOMING: A LOT
TURNING FROM BACK TO SIDE WHILE IN FLAT BAD: A LITTLE
STANDING UP FROM CHAIR USING ARMS: A LOT
WALKING IN HOSPITAL ROOM: TOTAL
MOBILITY SCORE: 13
MOBILITY SCORE: 13
DRESSING REGULAR LOWER BODY CLOTHING: A LOT
HELP NEEDED FOR BATHING: A LOT
MOVING FROM LYING ON BACK TO SITTING ON SIDE OF FLAT BED WITH BEDRAILS: A LOT
PERSONAL GROOMING: A LITTLE
EATING MEALS: A LITTLE
EATING MEALS: A LITTLE
HELP NEEDED FOR BATHING: A LOT
TURNING FROM BACK TO SIDE WHILE IN FLAT BAD: A LOT
MOVING FROM LYING ON BACK TO SITTING ON SIDE OF FLAT BED WITH BEDRAILS: A LITTLE
MOBILITY SCORE: 8
DRESSING REGULAR LOWER BODY CLOTHING: A LOT
WALKING IN HOSPITAL ROOM: TOTAL
MOVING TO AND FROM BED TO CHAIR: TOTAL
TOILETING: A LOT
DRESSING REGULAR UPPER BODY CLOTHING: A LOT
MOVING TO AND FROM BED TO CHAIR: A LITTLE
DRESSING REGULAR UPPER BODY CLOTHING: A LOT
EATING MEALS: A LITTLE
DAILY ACTIVITIY SCORE: 14
STANDING UP FROM CHAIR USING ARMS: TOTAL
DRESSING REGULAR UPPER BODY CLOTHING: A LOT
PERSONAL GROOMING: A LITTLE
DAILY ACTIVITIY SCORE: 14
TOILETING: A LOT
CLIMB 3 TO 5 STEPS WITH RAILING: TOTAL
TOILETING: A LOT
HELP NEEDED FOR BATHING: A LOT
MOVING FROM LYING ON BACK TO SITTING ON SIDE OF FLAT BED WITH BEDRAILS: A LITTLE
WALKING IN HOSPITAL ROOM: TOTAL
CLIMB 3 TO 5 STEPS WITH RAILING: TOTAL
MOVING TO AND FROM BED TO CHAIR: A LITTLE
DRESSING REGULAR LOWER BODY CLOTHING: A LOT
CLIMB 3 TO 5 STEPS WITH RAILING: TOTAL
TURNING FROM BACK TO SIDE WHILE IN FLAT BAD: A LITTLE
STANDING UP FROM CHAIR USING ARMS: A LOT

## 2024-03-17 ASSESSMENT — PAIN SCALES - GENERAL
PAINLEVEL_OUTOF10: 0 - NO PAIN

## 2024-03-17 ASSESSMENT — PAIN - FUNCTIONAL ASSESSMENT
PAIN_FUNCTIONAL_ASSESSMENT: 0-10

## 2024-03-17 ASSESSMENT — PAIN SCALES - WONG BAKER: WONGBAKER_NUMERICALRESPONSE: NO HURT

## 2024-03-17 NOTE — CARE PLAN
The patient's goals for the shift include      The clinical goals for the shift include Will be free from injury    Over the shift, the patient did not make progress toward the following goals. Barriers to progression include ***. Recommendations to address these barriers include ***.

## 2024-03-17 NOTE — PROGRESS NOTES
"Rogerio Miller is a 88 y.o. male on day 6 of admission presenting with WINSTON (acute kidney injury) (CMS/[facility]).    Subjective   No overnight events. More alert and interactive this AM. Denies pain. RN reports pt had minimal breakfast intake but that he seems to enjoy/request drinking ensure. Ok to continue to give ensure. Pt denies chest pain, sob, abd pain, n/v.    Family contacted via telephone and updated.         Objective     Physical Exam  Constitutional: elderly, awake and alert this AM, interactive, no distress, appears comfortable    ENMT: mucous membranes moist   Head/Neck: Neck supple   Respiratory/Thorax: Patent airways, CTAB, on RA, respirations even/nonlabored   Cardiovascular: Regular, rate and rhythm, no murmurs, normal S 1and S 2   Gastrointestinal: Nondistended, soft, nontender, no guarding nor rebound   Musculoskeletal: ROM intact, no joint swelling, normal strength   Extremities: normal extremities, no edema   Neurological: alert and oriented to self, remains confused as prior, intact senses,normal strength, follows commands, no facial droop         Last Recorded Vitals  Blood pressure 130/74, pulse 88, temperature 36.4 °C (97.5 °F), temperature source Temporal, resp. rate (!) 27, height 1.778 m (5' 10\"), weight 75.8 kg (167 lb 1.7 oz), SpO2 96 %.  Intake/Output last 3 Shifts:  I/O last 3 completed shifts:  In: 1525 (20.1 mL/kg) [P.O.:480; I.V.:895 (11.8 mL/kg); IV Piggyback:150]  Out: 3975 (52.4 mL/kg) [Urine:3975 (1.5 mL/kg/hr)]  Weight: 75.8 kg     Relevant Results  Scheduled medications  cefTRIAXone, 1 g, intravenous, q24h  citalopram, 10 mg, oral, Daily  heparin (porcine), 5,000 Units, subcutaneous, q8h KAMI  insulin glargine, 10 Units, subcutaneous, Nightly  insulin lispro, 0-10 Units, subcutaneous, Before meals & nightly  levothyroxine, 75 mcg, oral, Daily before breakfast  melatonin, 5 mg, oral, Nightly  memantine, 10 mg, oral, BID  polyethylene glycol, 17 g, oral, Daily  pramipexole, 0.125 mg, " oral, Daily  pravastatin, 10 mg, oral, Nightly      Continuous medications  dextrose 5%-0.45 % sodium chloride, 60 mL/hr, Last Rate: 60 mL/hr (03/17/24 1224)      PRN medications  PRN medications: dextrose 10 % in water (D10W), dextrose, glucagon, hydrALAZINE, scopolamine  Results from last 7 days   Lab Units 03/17/24 0533 03/16/24 0433 03/15/24  0452   WBC AUTO x10*3/uL 10.2 8.6 8.9   RBC AUTO x10*6/uL 3.88* 3.71* 3.83*   HEMOGLOBIN g/dL 11.7* 11.2* 11.5*       Results from last 7 days   Lab Units 03/17/24 0533 03/16/24  0433 03/15/24  0452 03/11/24  2020 03/11/24  1622 03/11/24  1234   SODIUM mmol/L 147* 144 143   < > 140  140 139   POTASSIUM mmol/L 3.9 4.0 4.7   < > 5.6*  5.6* 6.4*   CHLORIDE mmol/L 113* 110* 108*   < > 110*  110* 107   CO2 mmol/L 21 23 24   < > 21  21 22   BUN mg/dL 53* 59* 67*   < > 76*  76* 76*   CREATININE mg/dL 3.90* 4.93* 6.47*   < > 6.48*  6.48* 6.66*   CALCIUM mg/dL 9.2 8.9 9.4   < > 9.5  9.5 9.8   PHOSPHORUS mg/dL  --  3.9  --   --  4.1  --    MAGNESIUM mg/dL 1.75 1.85 1.98   < >  --  2.33   BILIRUBIN TOTAL mg/dL  --   --   --   --   --  0.5   ALT U/L  --   --   --   --   --  10   AST U/L  --   --   --   --   --  23    < > = values in this interval not displayed.       ECG 12 lead    Result Date: 3/12/2024  Electronic ventricular pacemaker When compared with ECG of 06-OCT-2022 23:43, Previous ECG has undetermined rhythm, needs review    ECG 12 lead    Result Date: 3/12/2024  Electronic ventricular pacemaker When compared with ECG of 11-MAR-2024 12:29, (unconfirmed) No significant change was found    US renal complete    Result Date: 3/11/2024  Interpreted By:  Troy Eng, STUDY: US RENAL COMPLETE;  3/11/2024 9:52 pm   INDICATION: Signs/Symptoms:WINSTON.   COMPARISON: None.   ACCESSION NUMBER(S): [id number]   ORDERING CLINICIAN: CHASTITY FUENTES   TECHNIQUE: Multiple images of the kidneys were obtained  .   FINDINGS: RIGHT KIDNEY: The right kidney measures 12.8 cm  in length.  The renal cortical echogenicity and thickness are within normal limits. Hydronephrosis is identified.. Degree is moderate   LEFT KIDNEY: The left kidney measures 10.7 in length. The renal cortical echogenicity and thickness are within normal limits. No hydronephrosis is present; no evidence of nephrolithiasis.   Decompressed urinary bladder.       Significant right-sided hydronephrosis. This is of unknown significance. Distal obstruction should be considered. Further evaluation is advised with CT imaging.   MACRO: None   Signed by: Troy Eng 3/11/2024 10:29 PM Dictation workstation:   [id number]    XR chest 1 view    Result Date: 3/11/2024  Interpreted By:  Chava Pina, STUDY: XR CHEST 1 VIEW   INDICATION: Signs/Symptoms:Chest Pain.   COMPARISON: October 7, 2022   ACCESSION NUMBER(S): [id number]   ORDERING CLINICIAN: SRIKANTH VASQUEZ   FINDINGS: Cardiomegaly with pacemaker unchanged.   No consolidation, effusion, edema, or pneumothorax. Remote right rib fracture.       No evidence of acute intrathoracic abnormality.   Signed by: Chava Pina 3/11/2024 1:27 PM Dictation workstation:   [id number]       Assessment/Plan   Principal Problem:    WINSTON (acute kidney injury) (CMS/HCC)  Active Problems:    Other hydronephrosis  Hyperkalemia, resolved  Urinary retention  Right hydronephrosis  History of Parkinson with dementia  Hypertension  Hyperlipidemia  History of CAD  on pacemaker    A/P:  Pt is an 88 year old male with PMH of Parkinsons with dementia, HTN, DLD, CAD who was admitted on 3/11 with WINSTON and weakness. Renal US showed hydronephrosis. CT showed ureteral stones. Urology consulted and pt taken to OR on 3/14 for b/l Ureteral stent placement. Renal following. Acute agitation and urinary retention overnight on 3/15-resolved. WINSTON improving     -Blood pressure stable.  -Hyperkalemia resolved status post Lokelma  -Kidney function slightly worse on  3/15 to 6.47 > 6.0 from 6.6 on admission, started on normal  saline 60 cc/h, nephrology following-->creatinine improving today to 3.90  -continue IVF per renal  -Renal ultrasound showed right hydronephrosis, CT scan was did show bilateral kidney stone, urology consulted and now s/p stent placement  bilaterally on 3/14  -Poor urine output expected from obstruction  -Urinary retention on admission s/p Figueroa catheter placed, keep Figueroa for strict I's and O's-->figueroa was removed by patient on 3/15 in the setting of agitation. Pt underwent straight cath x3 overnight on 3/16  -figueroa replaced on 3/16; figueroa to remain in place until urology follow up  -pt required geodon 3/16 early AM for agitation, lethargic during the day on 3/16--now resolved  -UA showed protein and glucose, no casts, ?? UTI started on ceftriaxone pending cx, could not assess if he is symptomatic or not.-->urine culture growing staph epi. Change to augmentin today to complete 7 day course  -Telemetry  -Regular diet, encourage oral hydration  -Strict I's and O's  -Avoid nephrotoxic medication  -continue with sliding scale insulin and continue lantus, takes 25 units at night, started with 10 units  -DVT prophylaxis with heparin subcu  -GI prophylaxis not indicated    Dispo:pending PT/OT    Pt's family updated via telephone today    CODE STATUS: DNR/DNI-confirmed 3/15 with wife and daughter               Bonny Martinez MD

## 2024-03-17 NOTE — PROGRESS NOTES
INPATIENT NEPHROLOGY CONSULT PROGRESS NOTE      Patient Name: Rogerio Miller MRN: [id number]  DATE of SERVICE: March 17, 2024  TIME of SERVICE: 11:51 AM  CONSULTING SERVICE: Nephrology    REASON for CONSULT: WINSTON, right sided hydronephrosis     SUBJECTIVE:  Patient seen and evaluated at bedside.  Patient remains confused.  Renal function has been improving.    SUMMARY OF STAY:  Mr. Miller is a 88 y.o. male who presents for eval of WINSTON detected on outpatient labs.   Found to have urinary retention requiring Yanez catheter placement.   Patient with past medical history significant for chronic kidney disease stage IIIa baseline serum creatinine 1.2 to 1.3 mg/L with EGFR of 50-55 mill per minute per 1.73 m² as of last year,  Longstanding history of diabetes mellitus, hypertension, hyperlipidemia, coronary artery disease, atrial fibrillation status post pacemaker for sick sinus syndrome, hypothyroidism.  Labs on presentation noted for acute kidney injury with creatinine up to 6.6 mg deciliter BUN of 76 and drop in GFR to 70 mill per minute per 1.73 m², hyperkalemia potassium 6.4 with mild hemolysis repeat potassium on an ABG still elevated at 5.8. Metabolic acidosis with bicarb of 20 pH of 7.38 with component of respiratory alkalosis. Hemoglobin 12.4 mg/dL close to patient baseline.     ASSESSMENT:  WINSTON on CKD IIIa: Multifactorial concern for hemodynamically mediated with bladder outlet obstruction, urinary retention requiring Yanez catheter placement. Renal ultrasound demonstrated right-sided hydronephrosis  Hyperkalemia, resolved.  Metabolic acidosis, improving.  Hypertension, optimally controlled.-    PLAN:  --Renal function has been improving slowly, creatinine is down to 3.9 mg/dL from 4.9 mg/dL and remains nonoliguric with urine output of 2.0 L in the last 24 hours and ins and outs significant for net 1.6 L negative.  -Mild hyponatremia with serum sodium of 147, will  switch IV fluids to D5 half-normal saline at 75 cc/h.  -Monitor blood sugars while being on fluids.  -Serum bicarbonate stable around 21.  -Hypertension: Blood pressure has been optimally controlled with systolic blood pressure mostly in 100s to 120s.  -Patient has Yanez catheter in place for urinary retention and urine has been clear.  Will follow, thank you!    Medications:    Current Facility-Administered Medications:     cefTRIAXone (Rocephin) IVPB 1 g, 1 g, intravenous, q24h, Stephen Trevizo MD, Stopped at 03/16/24 1504    citalopram (CeleXA) tablet 10 mg, 10 mg, oral, Daily, Stephen Trevizo MD, 10 mg at 03/17/24 0946    dextrose 10 % in water (D10W) infusion, 0.3 g/kg/hr, intravenous, Once PRN, Stephen Trevizo MD    dextrose 5%-0.45 % sodium chloride infusion, 60 mL/hr, intravenous, Continuous, Nader Dunbar MD    dextrose 50 % injection 25 g, 25 g, intravenous, q15 min PRN, Stephen Trevizo MD    glucagon (Glucagen) injection 1 mg, 1 mg, intramuscular, q15 min PRN, Stephen Trevizo MD    heparin (porcine) injection 5,000 Units, 5,000 Units, subcutaneous, q8h KAMI, Stephen Trevizo MD, 5,000 Units at 03/17/24 0947    hydrALAZINE (Apresoline) injection 10 mg, 10 mg, intravenous, q6h PRN, Stephen Trevizo MD, 10 mg at 03/14/24 0017    insulin glargine (Lantus) injection 10 Units, 10 Units, subcutaneous, Nightly, Stephen Trevizo MD, 10 Units at 03/15/24 2226    insulin lispro (HumaLOG) injection 0-10 Units, 0-10 Units, subcutaneous, Before meals & nightly, Stephen Trevizo MD, 2 Units at 03/12/24 1806    levothyroxine (Synthroid, Levoxyl) tablet 75 mcg, 75 mcg, oral, Daily before breakfast, Stephen Trevizo MD, 75 mcg at 03/17/24 0622    melatonin tablet 5 mg, 5 mg, oral, Nightly, Stephen Trevizo MD, 5 mg at 03/16/24 2225    memantine (Namenda) tablet 10 mg, 10 mg, oral, BID, Stephen Trevizo MD, 10 mg at 03/17/24 0946    polyethylene glycol (Glycolax, Miralax) packet 17 g, 17 g, oral, Daily, Stephen Trevizo MD, 17  g at 03/15/24 0840    pramipexole (Mirapex) tablet 0.125 mg, 0.125 mg, oral, Daily, Stephen Trevizo MD, 0.125 mg at 03/17/24 0946    pravastatin (Pravachol) tablet 10 mg, 10 mg, oral, Nightly, Stephen Trevizo MD, 10 mg at 03/16/24 2225    scopolamine (Transderm-Scop) patch 1 patch, 1 patch, transdermal, q72h PRN, Stephen Trevizo MD, 1 patch at 03/14/24 0338    PERTINENT ROS:  Unable to gather.  Physical Exam:  Vital signs in last 24 hours:  Temp:  [36 °C (96.8 °F)-36.4 °C (97.5 °F)] 36.4 °C (97.5 °F)  Heart Rate:  [] 100  Resp:  [11-12] 11  BP: (105-153)/() 125/67    General: Has been lethargic.  HEENT:  NCAT,  mucous membranes moist and pink  NECK: No JVD, no carotid bruit, supple, no cervical mass or thyromegaly  LUNGS;  Diminished breath sounds, no Rales  CV:  Distant, regular rate and rhythm, no murmurs  ABDOMEN:  abdomen soft, nontender, BS normal, no masses or organomegaly  EDEMA: No lower extremity edema/dependent edema  SKIN:  dry and normal turgor, no clubbing, cyanosis or petechia.  No rashes noted    Intake/Output last 3 shifts:  I/O last 3 completed shifts:  In: 1525 (20.1 mL/kg) [P.O.:480; I.V.:895 (11.8 mL/kg); IV Piggyback:150]  Out: 3975 (52.4 mL/kg) [Urine:3975 (1.5 mL/kg/hr)]  Weight: 75.8 kg     DATA:  Diagnotic tests reviewed for Todays visit:  Results from last 7 days   Lab Units 03/17/24  0533   WBC AUTO x10*3/uL 10.2   RBC AUTO x10*6/uL 3.88*   HEMOGLOBIN g/dL 11.7*   HEMATOCRIT % 37.8*     Results from last 7 days   Lab Units 03/17/24  0533 03/16/24  0433 03/11/24  1622 03/11/24  1234   SODIUM mmol/L 147* 144   < > 139   POTASSIUM mmol/L 3.9 4.0   < > 6.4*   CHLORIDE mmol/L 113* 110*   < > 107   CO2 mmol/L 21 23   < > 22   BUN mg/dL 53* 59*   < > 76*   CREATININE mg/dL 3.90* 4.93*   < > 6.66*   CALCIUM mg/dL 9.2 8.9   < > 9.8   PHOSPHORUS mg/dL  --  3.9   < >  --    MAGNESIUM mg/dL 1.75 1.85   < > 2.33   BILIRUBIN TOTAL mg/dL  --   --   --  0.5   ALT U/L  --   --   --  10   AST  U/L  --   --   --  23    < > = values in this interval not displayed.     Results from last 7 days   Lab Units 03/13/24  0245   COLOR U  Yellow   APPEARANCE U  Hazy*   PH U  8.0   SPEC GRAV UR  1.010   PROTEIN U mg/dL 30 (1+)*   BLOOD UR  MODERATE (2+)*   NITRITE U  NEGATIVE   WBC UR /HPF >50*       Us Kidney   INDINGS:  RIGHT KIDNEY:  The right kidney measures 12.8 cm  in length. The renal cortical  echogenicity and thickness are within normal limits. Hydronephrosis  is identified.. Degree is moderate      LEFT KIDNEY:  The left kidney measures 10.7 in length. The renal cortical  echogenicity and thickness are within normal limits. No  hydronephrosis is present; no evidence of nephrolithiasis.      Decompressed urinary bladder.      IMPRESSION:  Significant right-sided hydronephrosis. This is of unknown  significance. Distal obstruction should be considered. Further  evaluation is advised with CT imaging.      CXR  FINDINGS:  Cardiomegaly with pacemaker unchanged.      No consolidation, effusion, edema, or pneumothorax. Remote right rib  fracture.      IMPRESSION:  No evidence of acute intrathoracic abnormality  IMAGING: CXR reviewed in  images      SIGNATURE: Nader Dunbar MD  Nephrology and Hypertension  [address] [facility] [place]  Office phone: [phone]  FAX: [phone]    This note was partially generated using the Dragon voice recognition system, and there may be some incorrect words, spelling's and punctuation that were not noted in checking the note before saving.

## 2024-03-18 LAB
ANION GAP SERPL CALC-SCNC: 13 MMOL/L (ref 10–20)
BUN SERPL-MCNC: 59 MG/DL (ref 6–23)
CALCIUM SERPL-MCNC: 8.9 MG/DL (ref 8.6–10.3)
CHLORIDE SERPL-SCNC: 111 MMOL/L (ref 98–107)
CO2 SERPL-SCNC: 25 MMOL/L (ref 21–32)
CREAT SERPL-MCNC: 3.27 MG/DL (ref 0.5–1.3)
EGFRCR SERPLBLD CKD-EPI 2021: 18 ML/MIN/1.73M*2
ERYTHROCYTE [DISTWIDTH] IN BLOOD BY AUTOMATED COUNT: 12.8 % (ref 11.5–14.5)
GLUCOSE BLD MANUAL STRIP-MCNC: 189 MG/DL (ref 74–99)
GLUCOSE BLD MANUAL STRIP-MCNC: 192 MG/DL (ref 74–99)
GLUCOSE BLD MANUAL STRIP-MCNC: 247 MG/DL (ref 74–99)
GLUCOSE SERPL-MCNC: 178 MG/DL (ref 74–99)
HCT VFR BLD AUTO: 35 % (ref 41–52)
HGB BLD-MCNC: 10.6 G/DL (ref 13.5–17.5)
MAGNESIUM SERPL-MCNC: 1.79 MG/DL (ref 1.6–2.4)
MCH RBC QN AUTO: 29.9 PG (ref 26–34)
MCHC RBC AUTO-ENTMCNC: 30.3 G/DL (ref 32–36)
MCV RBC AUTO: 99 FL (ref 80–100)
NRBC BLD-RTO: 0 /100 WBCS (ref 0–0)
PLATELET # BLD AUTO: 180 X10*3/UL (ref 150–450)
POTASSIUM SERPL-SCNC: 3.5 MMOL/L (ref 3.5–5.3)
RBC # BLD AUTO: 3.55 X10*6/UL (ref 4.5–5.9)
SODIUM SERPL-SCNC: 145 MMOL/L (ref 136–145)
WBC # BLD AUTO: 9.7 X10*3/UL (ref 4.4–11.3)

## 2024-03-18 PROCEDURE — 2500000001 HC RX 250 WO HCPCS SELF ADMINISTERED DRUGS (ALT 637 FOR MEDICARE OP): Performed by: STUDENT IN AN ORGANIZED HEALTH CARE EDUCATION/TRAINING PROGRAM

## 2024-03-18 PROCEDURE — 99233 SBSQ HOSP IP/OBS HIGH 50: CPT | Performed by: STUDENT IN AN ORGANIZED HEALTH CARE EDUCATION/TRAINING PROGRAM

## 2024-03-18 PROCEDURE — 36415 COLL VENOUS BLD VENIPUNCTURE: CPT | Performed by: STUDENT IN AN ORGANIZED HEALTH CARE EDUCATION/TRAINING PROGRAM

## 2024-03-18 PROCEDURE — 97162 PT EVAL MOD COMPLEX 30 MIN: CPT | Mod: GP

## 2024-03-18 PROCEDURE — 82947 ASSAY GLUCOSE BLOOD QUANT: CPT

## 2024-03-18 PROCEDURE — 2500000002 HC RX 250 W HCPCS SELF ADMINISTERED DRUGS (ALT 637 FOR MEDICARE OP, ALT 636 FOR OP/ED): Performed by: UROLOGY

## 2024-03-18 PROCEDURE — 2500000004 HC RX 250 GENERAL PHARMACY W/ HCPCS (ALT 636 FOR OP/ED): Performed by: INTERNAL MEDICINE

## 2024-03-18 PROCEDURE — 2500000001 HC RX 250 WO HCPCS SELF ADMINISTERED DRUGS (ALT 637 FOR MEDICARE OP): Performed by: UROLOGY

## 2024-03-18 PROCEDURE — 80048 BASIC METABOLIC PNL TOTAL CA: CPT | Performed by: STUDENT IN AN ORGANIZED HEALTH CARE EDUCATION/TRAINING PROGRAM

## 2024-03-18 PROCEDURE — 97530 THERAPEUTIC ACTIVITIES: CPT | Mod: GP

## 2024-03-18 PROCEDURE — 1200000002 HC GENERAL ROOM WITH TELEMETRY DAILY

## 2024-03-18 PROCEDURE — 83735 ASSAY OF MAGNESIUM: CPT | Performed by: STUDENT IN AN ORGANIZED HEALTH CARE EDUCATION/TRAINING PROGRAM

## 2024-03-18 PROCEDURE — 97166 OT EVAL MOD COMPLEX 45 MIN: CPT | Mod: GO | Performed by: OCCUPATIONAL THERAPIST

## 2024-03-18 PROCEDURE — 2500000004 HC RX 250 GENERAL PHARMACY W/ HCPCS (ALT 636 FOR OP/ED): Performed by: UROLOGY

## 2024-03-18 PROCEDURE — 85027 COMPLETE CBC AUTOMATED: CPT | Performed by: STUDENT IN AN ORGANIZED HEALTH CARE EDUCATION/TRAINING PROGRAM

## 2024-03-18 PROCEDURE — 2500000004 HC RX 250 GENERAL PHARMACY W/ HCPCS (ALT 636 FOR OP/ED): Performed by: STUDENT IN AN ORGANIZED HEALTH CARE EDUCATION/TRAINING PROGRAM

## 2024-03-18 RX ORDER — LANOLIN ALCOHOL/MO/W.PET/CERES
400 CREAM (GRAM) TOPICAL ONCE
Status: COMPLETED | OUTPATIENT
Start: 2024-03-18 | End: 2024-03-18

## 2024-03-18 RX ORDER — AMOXICILLIN AND CLAVULANATE POTASSIUM 500; 125 MG/1; MG/1
1 TABLET, FILM COATED ORAL EVERY 12 HOURS SCHEDULED
Status: DISCONTINUED | OUTPATIENT
Start: 2024-03-18 | End: 2024-03-20

## 2024-03-18 RX ADMIN — PRAVASTATIN SODIUM 10 MG: 20 TABLET ORAL at 21:06

## 2024-03-18 RX ADMIN — HEPARIN SODIUM 5000 UNITS: 5000 INJECTION INTRAVENOUS; SUBCUTANEOUS at 09:30

## 2024-03-18 RX ADMIN — INSULIN LISPRO 2 UNITS: 100 INJECTION, SOLUTION INTRAVENOUS; SUBCUTANEOUS at 12:38

## 2024-03-18 RX ADMIN — PRAMIPEXOLE DIHYDROCHLORIDE 0.12 MG: 0.25 TABLET ORAL at 09:16

## 2024-03-18 RX ADMIN — INSULIN LISPRO 2 UNITS: 100 INJECTION, SOLUTION INTRAVENOUS; SUBCUTANEOUS at 21:00

## 2024-03-18 RX ADMIN — MEMANTINE 10 MG: 10 TABLET ORAL at 21:05

## 2024-03-18 RX ADMIN — CITALOPRAM HYDROBROMIDE 10 MG: 10 TABLET ORAL at 09:16

## 2024-03-18 RX ADMIN — SCOPOLAMINE 1 PATCH: 1.5 PATCH, EXTENDED RELEASE TRANSDERMAL at 21:04

## 2024-03-18 RX ADMIN — HEPARIN SODIUM 5000 UNITS: 5000 INJECTION INTRAVENOUS; SUBCUTANEOUS at 17:08

## 2024-03-18 RX ADMIN — AMOXICILLIN AND CLAVULANATE POTASSIUM 1 TABLET: 500; 125 TABLET, FILM COATED ORAL at 21:06

## 2024-03-18 RX ADMIN — Medication 5 MG: at 21:06

## 2024-03-18 RX ADMIN — MEMANTINE 10 MG: 10 TABLET ORAL at 09:16

## 2024-03-18 RX ADMIN — Medication 400 MG: at 09:16

## 2024-03-18 RX ADMIN — DEXTROSE AND SODIUM CHLORIDE 60 ML/HR: 5; 450 INJECTION, SOLUTION INTRAVENOUS at 03:10

## 2024-03-18 RX ADMIN — INSULIN LISPRO 4 UNITS: 100 INJECTION, SOLUTION INTRAVENOUS; SUBCUTANEOUS at 17:07

## 2024-03-18 RX ADMIN — INSULIN GLARGINE 10 UNITS: 100 INJECTION, SOLUTION SUBCUTANEOUS at 21:04

## 2024-03-18 RX ADMIN — POLYETHYLENE GLYCOL 3350 17 G: 17 POWDER, FOR SOLUTION ORAL at 09:18

## 2024-03-18 RX ADMIN — HEPARIN SODIUM 5000 UNITS: 5000 INJECTION INTRAVENOUS; SUBCUTANEOUS at 23:23

## 2024-03-18 RX ADMIN — INSULIN LISPRO 2 UNITS: 100 INJECTION, SOLUTION INTRAVENOUS; SUBCUTANEOUS at 09:16

## 2024-03-18 RX ADMIN — AMOXICILLIN AND CLAVULANATE POTASSIUM 1 TABLET: 250; 125 TABLET, FILM COATED ORAL at 09:17

## 2024-03-18 RX ADMIN — LEVOTHYROXINE SODIUM 75 MCG: 75 TABLET ORAL at 06:12

## 2024-03-18 ASSESSMENT — COGNITIVE AND FUNCTIONAL STATUS - GENERAL
WALKING IN HOSPITAL ROOM: A LOT
MOVING TO AND FROM BED TO CHAIR: A LOT
MOVING TO AND FROM BED TO CHAIR: A LOT
MOBILITY SCORE: 11
MOBILITY SCORE: 11
DRESSING REGULAR UPPER BODY CLOTHING: A LOT
EATING MEALS: A LITTLE
TURNING FROM BACK TO SIDE WHILE IN FLAT BAD: A LOT
DRESSING REGULAR UPPER BODY CLOTHING: A LOT
MOVING TO AND FROM BED TO CHAIR: TOTAL
HELP NEEDED FOR BATHING: A LOT
MOVING FROM LYING ON BACK TO SITTING ON SIDE OF FLAT BED WITH BEDRAILS: A LOT
STANDING UP FROM CHAIR USING ARMS: A LOT
DAILY ACTIVITIY SCORE: 14
DAILY ACTIVITIY SCORE: 14
EATING MEALS: A LITTLE
DRESSING REGULAR UPPER BODY CLOTHING: A LOT
STANDING UP FROM CHAIR USING ARMS: TOTAL
DRESSING REGULAR LOWER BODY CLOTHING: A LOT
WALKING IN HOSPITAL ROOM: A LOT
HELP NEEDED FOR BATHING: A LOT
MOBILITY SCORE: 8
PERSONAL GROOMING: A LOT
TOILETING: A LOT
CLIMB 3 TO 5 STEPS WITH RAILING: TOTAL
WALKING IN HOSPITAL ROOM: TOTAL
MOVING FROM LYING ON BACK TO SITTING ON SIDE OF FLAT BED WITH BEDRAILS: A LOT
TURNING FROM BACK TO SIDE WHILE IN FLAT BAD: A LOT
DRESSING REGULAR LOWER BODY CLOTHING: A LOT
TOILETING: A LOT
EATING MEALS: A LITTLE
HELP NEEDED FOR BATHING: A LOT
TURNING FROM BACK TO SIDE WHILE IN FLAT BAD: A LOT
DRESSING REGULAR LOWER BODY CLOTHING: A LOT
PERSONAL GROOMING: A LITTLE
MOVING FROM LYING ON BACK TO SITTING ON SIDE OF FLAT BED WITH BEDRAILS: A LOT
CLIMB 3 TO 5 STEPS WITH RAILING: TOTAL
CLIMB 3 TO 5 STEPS WITH RAILING: TOTAL
PERSONAL GROOMING: A LITTLE
STANDING UP FROM CHAIR USING ARMS: A LOT
TOILETING: A LOT
DAILY ACTIVITIY SCORE: 13

## 2024-03-18 ASSESSMENT — PAIN - FUNCTIONAL ASSESSMENT
PAIN_FUNCTIONAL_ASSESSMENT: 0-10

## 2024-03-18 ASSESSMENT — PAIN SCALES - WONG BAKER: WONGBAKER_NUMERICALRESPONSE: NO HURT

## 2024-03-18 ASSESSMENT — PAIN SCALES - GENERAL
PAINLEVEL_OUTOF10: 0 - NO PAIN

## 2024-03-18 ASSESSMENT — ACTIVITIES OF DAILY LIVING (ADL): ADL_ASSISTANCE: NEEDS ASSISTANCE

## 2024-03-18 NOTE — PROGRESS NOTES
INPATIENT NEPHROLOGY CONSULT PROGRESS NOTE      Patient Name: Rogerio Miller MRN: [id number]  DATE of SERVICE: March 18, 2024  TIME of SERVICE: 1:30 PM  CONSULTING SERVICE: Nephrology    REASON for CONSULT: WINSTON, right sided hydronephrosis     SUBJECTIVE:  Patient seen and evaluated at bedside.  Patient remains confused.  Renal function has been improving.    SUMMARY OF STAY:  Mr. Miller is a 88 y.o. male who presents for eval of WINSTON detected on outpatient labs.   Found to have urinary retention requiring Yanez catheter placement.   Patient with past medical history significant for chronic kidney disease stage IIIa baseline serum creatinine 1.2 to 1.3 mg/L with EGFR of 50-55 mill per minute per 1.73 m² as of last year,  Longstanding history of diabetes mellitus, hypertension, hyperlipidemia, coronary artery disease, atrial fibrillation status post pacemaker for sick sinus syndrome, hypothyroidism.  Labs on presentation noted for acute kidney injury with creatinine up to 6.6 mg deciliter BUN of 76 and drop in GFR to 70 mill per minute per 1.73 m², hyperkalemia potassium 6.4 with mild hemolysis repeat potassium on an ABG still elevated at 5.8. Metabolic acidosis with bicarb of 20 pH of 7.38 with component of respiratory alkalosis. Hemoglobin 12.4 mg/dL close to patient baseline.     ASSESSMENT:  WINSTON on CKD IIIa: Multifactorial concern for hemodynamically mediated with bladder outlet obstruction, urinary retention requiring Yanez catheter placement. Renal ultrasound demonstrated right-sided hydronephrosis  Hyperkalemia, resolved.  Metabolic acidosis, improving.  Hypertension, optimally controlled.-    PLAN:  -Renal function continues to improve, creatinine is down to 3.2 mg/dL from 3.9 mg/dL and BUN is down to 89.  -Remains nonoliguric with urine output of 850 cc in the last 24 hours.  -Hypertension: Blood pressure has been optimally controlled with systolic blood pressure  mostly in 120s to 140s.  -Currently on D5 water with half-normal saline at 60 cc/h and will discontinue IV hydration.  Monitor p.o. intake if patient remains having poor oral intake consider starting back on IV fluids.  -Hypernatremia, resolved.  -Patient has been on Yanez catheter for urinary retention.  Management per urology.  Will follow, thank you!    Medications:    Current Facility-Administered Medications:     amoxicillin-pot clavulanate (Augmentin) 250-125 mg per tablet 1 tablet, 1 tablet, oral, q12h KAMI, Bonny Martinez MD, 1 tablet at 03/18/24 0917    citalopram (CeleXA) tablet 10 mg, 10 mg, oral, Daily, Stephen Trevizo MD, 10 mg at 03/18/24 0916    dextrose 10 % in water (D10W) infusion, 0.3 g/kg/hr, intravenous, Once PRN, Stephen Trevizo MD    dextrose 5%-0.45 % sodium chloride infusion, 60 mL/hr, intravenous, Continuous, Nader Dunbar MD, Last Rate: 60 mL/hr at 03/18/24 0310, 60 mL/hr at 03/18/24 0310    dextrose 50 % injection 25 g, 25 g, intravenous, q15 min PRN, Stephen Trevizo MD    glucagon (Glucagen) injection 1 mg, 1 mg, intramuscular, q15 min PRN, Stephen Trevizo MD    heparin (porcine) injection 5,000 Units, 5,000 Units, subcutaneous, q8h AKMI, Stephen Trevizo MD, 5,000 Units at 03/18/24 0930    hydrALAZINE (Apresoline) injection 10 mg, 10 mg, intravenous, q6h PRN, Stephen Trevizo MD, 10 mg at 03/14/24 0017    insulin glargine (Lantus) injection 10 Units, 10 Units, subcutaneous, Nightly, Stephen Trevizo MD, 10 Units at 03/17/24 2030    insulin lispro (HumaLOG) injection 0-10 Units, 0-10 Units, subcutaneous, Before meals & nightly, Stephen Trevizo MD, 2 Units at 03/18/24 1238    levothyroxine (Synthroid, Levoxyl) tablet 75 mcg, 75 mcg, oral, Daily before breakfast, Stephen Trevizo MD, 75 mcg at 03/18/24 0612    melatonin tablet 5 mg, 5 mg, oral, Nightly, Stephen Trevizo MD, 5 mg at 03/17/24 2031    memantine (Namenda) tablet 10 mg, 10 mg, oral, BID, Stephen Trevizo MD, 10 mg at 03/18/24  0916    polyethylene glycol (Glycolax, Miralax) packet 17 g, 17 g, oral, Daily, Stephen Trevizo MD, 17 g at 03/18/24 0918    pramipexole (Mirapex) tablet 0.125 mg, 0.125 mg, oral, Daily, Stephen Trevizo MD, 0.125 mg at 03/18/24 0916    pravastatin (Pravachol) tablet 10 mg, 10 mg, oral, Nightly, Stephen Trevizo MD, 10 mg at 03/17/24 2028    scopolamine (Transderm-Scop) patch 1 patch, 1 patch, transdermal, q72h PRN, Stephen Trevizo MD, 1 patch at 03/14/24 0338    PERTINENT ROS:  Unable to gather.  Physical Exam:  Vital signs in last 24 hours:  Temp:  [36 °C (96.8 °F)-37 °C (98.6 °F)] 36 °C (96.8 °F)  Heart Rate:  [] 65  Resp:  [16-20] 18  BP: ()/() 129/87    General: Has been lethargic.  HEENT:  NCAT,  mucous membranes moist and pink  NECK: No JVD, no carotid bruit, supple, no cervical mass or thyromegaly  LUNGS;  Diminished breath sounds, no Rales  CV:  Distant, regular rate and rhythm, no murmurs  ABDOMEN:  abdomen soft, nontender, BS normal, no masses or organomegaly  EDEMA: No lower extremity edema/dependent edema  SKIN:  dry and normal turgor, no clubbing, cyanosis or petechia.  No rashes noted    Intake/Output last 3 shifts:  I/O last 3 completed shifts:  In: 496 (6.5 mL/kg) [P.O.:120; I.V.:376 (5 mL/kg)]  Out: 1900 (25.1 mL/kg) [Urine:1900 (0.7 mL/kg/hr)]  Weight: 75.8 kg     DATA:  Diagnotic tests reviewed for Todays visit:  Results from last 7 days   Lab Units 03/18/24  0536   WBC AUTO x10*3/uL 9.7   RBC AUTO x10*6/uL 3.55*   HEMOGLOBIN g/dL 10.6*   HEMATOCRIT % 35.0*     Results from last 7 days   Lab Units 03/18/24  0536 03/17/24  0533 03/16/24  0433   SODIUM mmol/L 145   < > 144   POTASSIUM mmol/L 3.5   < > 4.0   CHLORIDE mmol/L 111*   < > 110*   CO2 mmol/L 25   < > 23   BUN mg/dL 59*   < > 59*   CREATININE mg/dL 3.27*   < > 4.93*   CALCIUM mg/dL 8.9   < > 8.9   PHOSPHORUS mg/dL  --   --  3.9   MAGNESIUM mg/dL 1.79   < > 1.85    < > = values in this interval not displayed.     Results  from last 7 days   Lab Units 03/13/24  0245   COLOR U  Yellow   APPEARANCE U  Hazy*   PH U  8.0   SPEC GRAV UR  1.010   PROTEIN U mg/dL 30 (1+)*   BLOOD UR  MODERATE (2+)*   NITRITE U  NEGATIVE   WBC UR /HPF >50*       Us Kidney   INDINGS:  RIGHT KIDNEY:  The right kidney measures 12.8 cm  in length. The renal cortical  echogenicity and thickness are within normal limits. Hydronephrosis  is identified.. Degree is moderate      LEFT KIDNEY:  The left kidney measures 10.7 in length. The renal cortical  echogenicity and thickness are within normal limits. No  hydronephrosis is present; no evidence of nephrolithiasis.      Decompressed urinary bladder.      IMPRESSION:  Significant right-sided hydronephrosis. This is of unknown  significance. Distal obstruction should be considered. Further  evaluation is advised with CT imaging.      CXR  FINDINGS:  Cardiomegaly with pacemaker unchanged.      No consolidation, effusion, edema, or pneumothorax. Remote right rib  fracture.      IMPRESSION:  No evidence of acute intrathoracic abnormality  IMAGING: CXR reviewed in  images      SIGNATURE: Nader Dunbar MD  Nephrology and Hypertension  [address] [place]  Office phone: [phone]  FAX: [phone]    This note was partially generated using the Dragon voice recognition system, and there may be some incorrect words, spelling's and punctuation that were not noted in checking the note before saving.

## 2024-03-18 NOTE — PROGRESS NOTES
"Rogerio Miller is a 88 y.o. male on day 7 of admission presenting with WINSTON (acute kidney injury) (CMS/HCC).    Subjective   Labs impoving; pulled out figueroa on friday       Objective     Physical Exam    Last Recorded Vitals  Blood pressure 129/87, pulse 65, temperature 36 °C (96.8 °F), resp. rate 18, height 1.778 m (5' 10\"), weight 75.8 kg (167 lb 1.7 oz), SpO2 97 %.  Intake/Output last 3 Shifts:  I/O last 3 completed shifts:  In: 496 (6.5 mL/kg) [P.O.:120; I.V.:376 (5 mL/kg)]  Out: 1900 (25.1 mL/kg) [Urine:1900 (0.7 mL/kg/hr)]  Weight: 75.8 kg     Relevant Results                             Assessment/Plan   Principal Problem:    WINSTON (acute kidney injury) (CMS/HCC)  Active Problems:    Other hydronephrosis    S/p b/l jj stents 3/14/24       Dispostion---will need fu b/l urs/stone package and residual bladder stone removal in 3-4 weeks once winston/uti resolved.           Stephen Trevizo MD      "

## 2024-03-18 NOTE — NURSING NOTE
EOS:  Remains confused but cooperative.  Yanez remains intact draining deysi colored urine.  Tolerating p.o. fluids well.  IV fluids continue.

## 2024-03-18 NOTE — PROGRESS NOTES
PHARMACIST CONSULT  RENAL DOSING ADJUSTMENT    Patient Name: Rogerio Miller  MRN: [id number]  Admission Date: 3/11/2024 11:47 AM  Date/Time of Consult: 03/18/24 at 4:48 PM    In accordance with the inpatient pharmacy renal dose adjustment consult agreement the following medication changes have been made: Augmentin   Antimicrobial: currently ordered Augmentin 250mg/125 q12hr, will be adjusted to 500mg/125 q12hr    Indication if pertinent for dosing: Uncomplicated UTI    Results from last 72 hours   Lab Units 03/18/24  0536 03/17/24  0533 03/16/24  0433   CREATININE mg/dL 3.27* 3.90* 4.93*   BUN mg/dL 59* 53* 59*       Serum creatinine: 3.27 mg/dL (H) 03/18/24 0536  Estimated creatinine clearance: 16.4 mL/min (A)      Per consult agreement, pharmacy will order related labs, evaluate results, and adjust dosing as it pertains to the drug therapy being managed.  Thank you for allowing me to participate in the care for this patient.    Signature: Dashawn Zayas, PharmD  03/18/24 at 4:48 PM

## 2024-03-18 NOTE — DOCUMENTATION CLARIFICATION NOTE
"    PATIENT:               ROME WONG  ACCT #:                  [id number]  MRN:                       [id number]  :                       1935  ADMIT DATE:       3/11/2024 11:47 AM  DISCH DATE:  RESPONDING PROVIDER #:        [id number]          PROVIDER RESPONSE TEXT:    Metabolic encephalopathy 2/2 UTI    CDI QUERY TEXT:    UH_Altered Mental Status    Instruction:    Based on your assessment of the patient and the clinical information, please provide the requested documentation by clicking on the appropriate radio button and enter any additional information if prompted.    Question: Please further clarify the most likely etiology of the altered mental status as    When answering this query, please exercise your independent professional judgment. The fact that a question is being asked, does not imply that any particular answer is desired or expected.    The patient's clinical indicators include:  Clinical Information: 87 y/o M admitted with WINSTON and hydronephrosis    Clinical Indicators:    UC 3/13 Staphylococcus epidermidis  CR: 6.66, 6.48, 6.35, 6.01, 6.43, 6.47, 4.93, 3.90, 3.27    ED note 3/11 Dr. Dobbins: \"presenting to the emergency department for abnormal labs.  Patient's creatinine was 6.15 BUN 78 and potassium 6.1.  Daughter notes that patient has been more tired of late.  Alert and oriented to self and place\"    H/P 3/11 and MD PN 3/12 Dr. Fontaine: \"WINSTON\"    nephrology PN 3/13 Dr. 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 Fontaine: \"WINSTON, right hydronephrosis.  UTI\"    urology consult 3/14 Dr. Alvarez: \"bilateral ureter stone, winston, uti\"    MD [place]FIELD 3/14 Dr. Fontaine: \"Kidney function slightly worse today 6.4 > 6.0 from 6.6 on admission.  Renal ultrasound showed right hydronephrosis, CT scan did show bilateral kidney stone, urology consulted and plans for stent placement today bilaterally\"    MD PN 3/15 DR." "Juan: \"Initially appeared confused asking about \"putting on my shoes\" but pt was able to answer some orientation questions.\"    urology PN 3/15 DR. Gudino: \"WINSTON, hydronephrosis.  Pt is confused and pulling out ivs, figueroa and anything he can get his hands on.  Will use external collecting device and hematuria should  reesolve in a day or 2.\"    MD PN 3/16 Dr. Martinez: \"Acute agitation and urinary retention overnight.  pt required geodon overnight for agitation. Closely monitor today as pt remains lethargic this AM\"    nephrology PN 3/16 Dr. Dunbar: \"Patient remains lethargic.\"    MD PN 34/17 Dr. Martinez: \"More alert and interactive this AM.  Neurological: alert and oriented to self, remains confused as prior\"    Treatment: daily labs, frequent monitoring of vital signs, nephrology consult, UA, UC, NA bicarb at 100ml/hr, NS at 60ml/hr, IV rocephin, PO augmentin, cysto with bilateral stent insertion    Risk Factors: UTI, hydronephrosis, WINSTON, Parkinson's with dementia  Options provided:  -- Metabolic encephalopathy 2/2 UTI  -- Metabolic encephalopathy 2/2 WINSTON  -- Worsening dementia  -- Other - I will add my own diagnosis  -- Refer to Clinical Documentation Reviewer    Query created by: Zahra Butler on 3/18/2024 2:08 PM      Electronically signed by:  DULCE MARTINEZ MD 3/18/2024 4:21 PM          "

## 2024-03-18 NOTE — PROGRESS NOTES
Physical Therapy    Physical Therapy Evaluation and Treatment    Patient Name: Rogerio Miller  MRN: [id number]  Today's Date: 3/18/2024   Time Calculation  Start Time: 1006  Stop Time: 1026  Time Calculation (min): 20 min    Assessment/Plan   PT Assessment  PT Assessment Results: Decreased strength, Decreased range of motion, Decreased endurance, Impaired balance, Decreased mobility, Decreased safety awareness, Impaired judgement, Decreased cognition, Decreased coordination  Rehab Prognosis: Fair  Evaluation/Treatment Tolerance: Patient limited by fatigue  Medical Staff Made Aware: Yes  Barriers to Participation: Insight into problems  End of Session Communication: Bedside nurse  Assessment Comment: Pt presents today below baseline level of function and requires continued PT during hospital stay. Pt requires 24 hour physical assist for all mobility to prevent falls. Pt is unsafe to navigate home environment at this time with available support and assist. Pt requires PT at a moderate intensity level at discharge to maximize functional mobility and safety.    End of Session Patient Position: Alarm on, Bed, 3 rail up  IP OR SWING BED PT PLAN  Inpatient or Swing Bed: Inpatient  PT Plan  Treatment/Interventions: Bed mobility, Transfer training, Gait training, Balance training, Neuromuscular re-education, Strengthening, Endurance training, Range of motion, Therapeutic exercise, Therapeutic activity, Home exercise program  PT Plan: Skilled PT  PT Frequency: 3 times per week  PT Discharge Recommendations: Moderate intensity level of continued care  Equipment Recommended upon Discharge: Wheeled walker  PT Recommended Transfer Status: Assist x2 (FWW, gait belt)  PT - OK to Discharge: Yes (To next level of care when cleared by medical team   )    Subjective       General Visit Information:  General  Reason for Referral: impaired mobility  Referred By: Bonny Martinez MD  Past Medical History Relevant to Rehab: Pt admitted with  abnormal labs and weakness. PMH: MI, back sx, pacemaker, CABG, Parkinson's disease with dementia, HLD  Family/Caregiver Present: No  Co-Treatment: OT  Co-Treatment Reason: to maximize pt safety and functional mobility  Prior to Session Communication: Bedside nurse  Patient Position Received: Bed, 3 rail up, Alarm on  Preferred Learning Style: verbal  General Comment: Pt agreeable to PT, nursing cleared for treatment.    Home Living:  Home Living  Type of Home: Assisted living  Lives With: Spouse  Home Adaptive Equipment: Walker rolling or standard, Wheelchair-manual  Home Layout: One level  Home Access: Level entry  Bathroom Shower/Tub: Tub/shower unit  Bathroom Equipment: Grab bars in shower    Prior Level of Function:  Prior Function Per Pt/Caregiver Report  Level of [place]: Needs assistance with ADLs  ADL Assistance: Needs assistance  Homemaking Assistance: Needs assistance  Ambulatory Assistance:  (per pt, ambulates short distances with walker. Pt states he mainly uses wheelchair in room and takes wheelchair to dining room)    Precautions:  Precautions  Medical Precautions: Fall precautions  Precautions Comment: figueroa    Vital Signs:     Objective     Pain:  Pain Assessment  Pain Assessment: 0-10  Pain Score: 0 - No pain    Cognition:  Cognition  Overall Cognitive Status: Impaired  Orientation Level: Disoriented to situation, Disoriented to time  Safety/Judgement:  (impaired)  Insight: Moderate  Processing Speed: Delayed    General Assessments:      Activity Tolerance  Endurance: Tolerates less than 10 min exercise, no significant change in vital signs              Postural Control  Posture Comment: flexed posturing at trunk, kness, and hips  Static Sitting Balance  Static Sitting-Comment/Number of Minutes: good  Dynamic Sitting Balance  Dynamic Sitting-Comments: fair  Static Standing Balance  Static Standing-Comment/Number of Minutes: poor  Dynamic Standing Balance  Dynamic Standing-Comments:  poor    Functional Assessments:     Bed Mobility  Bed Mobility: Yes  Bed Mobility 1  Bed Mobility 1: Supine to sitting  Level of Assistance 1: Maximum assistance  Bed Mobility Comments 1: x 2  Bed Mobility 2  Bed Mobility  2: Sitting to supine  Level of Assistance 2: Maximum assistance (x 2)  Transfers  Transfer: Yes  Transfer 1  Transfer From 1: Sit to  Transfer to 1: Stand  Transfer Device 1: Walker  Transfer Level of Assistance 1: Maximum assistance (x 2)  Trials/Comments 1: flexed posturing and retro lean  Transfers 2  Transfer From 2: Stand to  Transfer to 2: Sit  Transfer Device 2: Walker  Transfer Level of Assistance 2: Maximum assistance (x 2)  Ambulation/Gait Training  Ambulation/Gait Training Performed: Yes (unable to stand due to poor standing balance)        Treatment    Verbal and tactile cues to facilitate abduction/adduction of BLE's and increased use of bed rail to transition supine<>sitting EOB. Cues for placing feet flat on floor at EOB and squaring hips to maximize safety. Cues for safe hand placement with use of FWW for sit<>stand. Cues for anterior weight shift and upright gaze in standing.       Extremity/Trunk Assessments:        RLE   RLE : Exceptions to WFL  AROM RLE (degrees)  RLE AROM Comment: WFL  R Knee Extension 0-130:  (75% seated at EOB)  Strength RLE  RLE Overall Strength:  (grossly 4/5 throughout)  LLE   LLE : Exceptions to WFL  AROM LLE (degrees)  LLE AROM Comment: WFL  L Knee Extension 0-130:  (75% seated at EOB)  Strength LLE  LLE Overall Strength:  (grossly 4/5 throughout)    Outcome Measures:  [facility] Basic Mobility  Turning from your back to your side while in a flat bed without using bedrails: A lot  Moving from lying on your back to sitting on the side of a flat bed without using bedrails: A lot  Moving to and from bed to chair (including a wheelchair): A lot  Standing up from a chair using your arms (e.g. wheelchair or bedside chair): A lot  To walk in hospital room: A  lot  Climbing 3-5 steps with railing: Total  Basic Mobility - Total Score: 11                            Goals:  Encounter Problems       Encounter Problems (Active)       PT Problem       Pt will perform bed mobility with mod A.        Start:  03/18/24    Expected End:  04/01/24            Pt will complete sit <> stand and bed <> chair transfers with mod A.        Start:  03/18/24    Expected End:  04/01/24            Pt will ambulate 5 feet mod A with no LOB.       Start:  03/18/24    Expected End:  04/01/24            Pt will progress to completing 3 x 20 supine/seated exercises in order to increase strength and improve gait mechanics.         Start:  03/18/24    Expected End:  04/01/24                 Education Documentation  Precautions, taught by Maria D Hudson PT at 3/18/2024  1:51 PM.  Learner: Patient  Readiness: Acceptance  Method: Explanation  Response: Needs Reinforcement  Comment: Cues for proper completion of bed mobility and transfers    Body Mechanics, taught by Maria D Hudson PT at 3/18/2024  1:51 PM.  Learner: Patient  Readiness: Acceptance  Method: Explanation  Response: Needs Reinforcement  Comment: Cues for proper completion of bed mobility and transfers    Mobility Training, taught by Maria D Hudson PT at 3/18/2024  1:51 PM.  Learner: Patient  Readiness: Acceptance  Method: Explanation  Response: Needs Reinforcement  Comment: Cues for proper completion of bed mobility and transfers    Education Comments  No comments found.

## 2024-03-18 NOTE — PROGRESS NOTES
03/18/24 1448   Discharge Planning   Home or Post Acute Services Post acute facilities (Rehab/SNF/etc)   Type of Post Acute Facility Services Skilled nursing   Patient expects to be discharged to: [facility]  snf     Therapy evkaterin completed. Asked the direct precert team to start precert.

## 2024-03-18 NOTE — PROGRESS NOTES
"Rogerio Miller is a 88 y.o. male on day 7 of admission presenting with WINSTON (acute kidney injury) (CMS/[facility]).    Subjective   No overnight events. Denies pain. Tolerating intake. Pt denies chest pain, sob, abd pain, n/v.    Family contacted via telephone and updated today        Objective     Physical Exam  Constitutional: elderly, awake and alert this AM, interactive, no distress, appears comfortable    ENMT: mucous membranes moist   Head/Neck: Neck supple   Respiratory/Thorax: Patent airways, CTAB, on RA, respirations even/nonlabored   Cardiovascular: Regular, rate and rhythm, no murmurs, normal S 1and S 2   Gastrointestinal: Nondistended, soft, nontender, no guarding nor rebound   Musculoskeletal: ROM intact, no joint swelling, normal strength   Extremities: normal extremities, no edema   Neurological: alert and oriented to self, less confused today, intact senses,normal strength, follows commands, no facial droop         Last Recorded Vitals  Blood pressure 112/89, pulse 107, temperature 36.5 °C (97.7 °F), resp. rate 19, height 1.778 m (5' 10\"), weight 75.8 kg (167 lb 1.7 oz), SpO2 99 %.  Intake/Output last 3 Shifts:  I/O last 3 completed shifts:  In: 496 (6.5 mL/kg) [P.O.:120; I.V.:376 (5 mL/kg)]  Out: 1900 (25.1 mL/kg) [Urine:1900 (0.7 mL/kg/hr)]  Weight: 75.8 kg     Relevant Results  Scheduled medications  amoxicillin-pot clavulanate, 1 tablet, oral, q12h KAMI  citalopram, 10 mg, oral, Daily  heparin (porcine), 5,000 Units, subcutaneous, q8h KAMI  insulin glargine, 10 Units, subcutaneous, Nightly  insulin lispro, 0-10 Units, subcutaneous, Before meals & nightly  levothyroxine, 75 mcg, oral, Daily before breakfast  melatonin, 5 mg, oral, Nightly  memantine, 10 mg, oral, BID  polyethylene glycol, 17 g, oral, Daily  pramipexole, 0.125 mg, oral, Daily  pravastatin, 10 mg, oral, Nightly      Continuous medications       PRN medications  PRN medications: dextrose 10 % in water (D10W), dextrose, glucagon, hydrALAZINE, " scopolamine  Results from last 7 days   Lab Units 03/18/24  0536 03/17/24  0533 03/16/24  0433   WBC AUTO x10*3/uL 9.7 10.2 8.6   RBC AUTO x10*6/uL 3.55* 3.88* 3.71*   HEMOGLOBIN g/dL 10.6* 11.7* 11.2*       Results from last 7 days   Lab Units 03/18/24  0536 03/17/24  0533 03/16/24  0433   SODIUM mmol/L 145 147* 144   POTASSIUM mmol/L 3.5 3.9 4.0   CHLORIDE mmol/L 111* 113* 110*   CO2 mmol/L 25 21 23   BUN mg/dL 59* 53* 59*   CREATININE mg/dL 3.27* 3.90* 4.93*   CALCIUM mg/dL 8.9 9.2 8.9   PHOSPHORUS mg/dL  --   --  3.9   MAGNESIUM mg/dL 1.79 1.75 1.85       ECG 12 lead    Result Date: 3/12/2024  Electronic ventricular pacemaker When compared with ECG of 06-OCT-2022 23:43, Previous ECG has undetermined rhythm, needs review    ECG 12 lead    Result Date: 3/12/2024  Electronic ventricular pacemaker When compared with ECG of 11-MAR-2024 12:29, (unconfirmed) No significant change was found    US renal complete    Result Date: 3/11/2024  Interpreted By:  Troy Eng, STUDY: US RENAL COMPLETE;  3/11/2024 9:52 pm   INDICATION: Signs/Symptoms:WINSTON.   COMPARISON: None.   ACCESSION NUMBER(S): [id number]   ORDERING CLINICIAN: CHASTITY FUENTES   TECHNIQUE: Multiple images of the kidneys were obtained  .   FINDINGS: RIGHT KIDNEY: The right kidney measures 12.8 cm  in length. The renal cortical echogenicity and thickness are within normal limits. Hydronephrosis is identified.. Degree is moderate   LEFT KIDNEY: The left kidney measures 10.7 in length. The renal cortical echogenicity and thickness are within normal limits. No hydronephrosis is present; no evidence of nephrolithiasis.   Decompressed urinary bladder.       Significant right-sided hydronephrosis. This is of unknown significance. Distal obstruction should be considered. Further evaluation is advised with CT imaging.   MACRO: None   Signed by: Troy Eng 3/11/2024 10:29 PM Dictation workstation:   [id number]    XR chest 1 view    Result Date:  3/11/2024  Interpreted By:  Chava Pina, STUDY: XR CHEST 1 VIEW   INDICATION: Signs/Symptoms:Chest Pain.   COMPARISON: October 7, 2022   ACCESSION NUMBER(S): [id number]   ORDERING CLINICIAN: SRIKANTH VASQUEZ   FINDINGS: Cardiomegaly with pacemaker unchanged.   No consolidation, effusion, edema, or pneumothorax. Remote right rib fracture.       No evidence of acute intrathoracic abnormality.   Signed by: Chava Pina 3/11/2024 1:27 PM Dictation workstation:   [id number]       Assessment/Plan   Principal Problem:    WINSTON (acute kidney injury) (CMS/HCC)  Active Problems:    Other hydronephrosis  Hyperkalemia, resolved  Urinary retention  Right hydronephrosis  History of Parkinson with dementia  Hypertension  Hyperlipidemia  History of CAD  on pacemaker    A/P:  Pt is an 88 year old male with PMH of Parkinsons with dementia, HTN, DLD, CAD who was admitted on 3/11 with WINSTON and weakness. Renal US showed hydronephrosis. CT showed ureteral stones. Urology consulted and pt taken to OR on 3/14 for b/l Ureteral stent placement. Renal following. Acute agitation and urinary retention overnight on 3/15-resolved. WINSTON improving     -Blood pressure stable.  -Hyperkalemia resolved status post Lokelma  -Kidney function slightly worse on  3/15 to 6.47 > 6.0 from 6.6 on admission, started on normal saline 60 cc/h, nephrology following-->creatinine improving today to 3.27  -continue IVF per renal  -Renal ultrasound showed right hydronephrosis, CT scan was did show bilateral kidney stone, urology consulted and now s/p stent placement  bilaterally on 3/14  -Poor urine output expected from obstruction  -Urinary retention on admission s/p Figueroa catheter placed, keep Figueroa for strict I's and O's-->figueroa was removed by patient on 3/15 in the setting of agitation. Pt underwent straight cath x3 overnight on 3/16  -figueroa replaced on 3/16; figueroa to remain in place until urology follow up  -pt required geodon 3/16 early AM for agitation, lethargic  during the day on 3/16--now resolved  -UA showed protein and glucose, no casts, ?? UTI started on ceftriaxone pending cx, could not assess if he is symptomatic or not.-->urine culture growing staph epi. Change to augmentin on 3/17 to complete 7 day course  -Telemetry  -Regular diet, encourage oral hydration  -Strict I's and O's  -Avoid nephrotoxic medication  -continue with sliding scale insulin and continue lantus, takes 25 units at night, started with 10 units  -DVT prophylaxis with heparin subcu  -GI prophylaxis not indicated    Dispo: SNF    Pt's family updated via telephone today    CODE STATUS: DNR/DNI-confirmed 3/15 with wife and daughter               Bonny Martinez MD

## 2024-03-18 NOTE — PROGRESS NOTES
Occupational Therapy    Evaluation    Patient Name: Rogerio Miller  MRN: [id number]  Today's Date: 3/18/2024  Time Calculation  Start Time: 1007  Stop Time: 1025  Time Calculation (min): 18 min    Assessment  IP OT Assessment  Prognosis: Good  Evaluation/Treatment Tolerance: Patient tolerated treatment well  Medical Staff Made Aware: Yes    Plan:  Treatment Interventions: ADL retraining, UE strengthening/ROM, Functional transfer training  OT Frequency: 3 times per week  OT Discharge Recommendations: Moderate intensity level of continued care  Equipment Recommended upon Discharge: Wheeled walker  OT - OK to Discharge: Yes    Subjective     Current Problem:  1. WINSTON (acute kidney injury) (CMS/HCC)  Case Request Operating Room: Cystoscopy with Insertion Stent Ureter    Case Request Operating Room: Cystoscopy with Insertion Stent Ureter    Calculi (Stone) Analysis    Calculi (Stone) Analysis      2. Hyperkalemia        3. Uremic acidosis        4. Other hydronephrosis  Case Request Operating Room: Cystoscopy with Insertion Stent Ureter    Case Request Operating Room: Cystoscopy with Insertion Stent Ureter    Calculi (Stone) Analysis    Calculi (Stone) Analysis      5. Urinary retention            General:  General  Reason for Referral: Decreased ADLs/ Abnormal labs, weaknss, poor oral intake. H/P Dementia and Parkinsons.  Referred By: Dr. Margot Martinez  Past Medical History Relevant to Rehab: HTN, Hyperlipidemia, CAD, DM, back surgery, pacer 2017, CABG 2016, Laminectomy, Parkinsons.  Family/Caregiver Present: No  Co-Treatment: PT  Co-Treatment Reason: help with transfers  Prior to Session Communication: Bedside nurse  Patient Position Received: Bed, 3 rail up  General Comment: bed check tele,SCD's, MARSHALL. (Pt has poor oral intake)    Precautions:  UE Weight Bearing Status: Weight Bearing as Tolerated  Medical Precautions: Fall precautions    Vital Signs:       Pain:  Pain Assessment  Pain Assessment: 0-10  Pain Score: 0  - No pain    Objective     Cognition:  Overall Cognitive Status: Impaired (A&O x 1-2)  Orientation Level: Disoriented to time, Disoriented to situation             Home Living:  Type of Home: Assisted living  Lives With: Spouse  Home Layout: One level  Home Access: No concerns  Bathroom Shower/Tub: Tub/shower unit  Bathroom Equipment: Grab bars in shower, Shower chair with back     Prior Function:  Level of [place]: Needs assistance with ADLs  Hand Dominance: Right  Prior Function Comments: Pt had assist with ADLs. Pt has limited shoulder Flx both arms and has Parkinsons    IADL History:  Homemaking Responsibilities: No  Mode of Transportation: Car (family drives)    ADL:  Eating Assistance: Stand by  Grooming Assistance: Stand by  UE Dressing Assistance: Minimal  LE Dressing Assistance: Moderate  Toileting Assistance with Device: Maximal (Pt has Yanez now)  ADL Comments: Pt has limited ROM both shoulders and Parkinsons Pt is anxious and has a fear of falling.    Activity Tolerance:  Endurance: Tolerates less than 10 min exercise with changes in vital signs    Bed Mobility/Transfers:   Bed Mobility  Bed Mobility: Yes  Bed Mobility 1  Bed Mobility 1: Supine to sitting  Level of Assistance 1: Maximum assistance (x2)  Bed Mobility 2  Bed Mobility  2: Sitting to supine  Level of Assistance 2: Minimum assistance (x2)  Transfers  Transfer: Yes  Transfer 1  Transfer From 1: Sit to  Transfer to 1: Stand  Transfer Device 1: Gait belt, Walker  Transfer Level of Assistance 1: Maximum assistance (x2)  Transfers 2  Trials/Comments 2: Pt stood with Max A of 2. Pt had retro lean. Pt not able to stand long or take any steps. Pt has fear of falling.    Ambulation/Gait Training:       Sitting Balance:       Standing Balance:       Vision: Vision - Basic Assessment  Current Vision: No visual deficits   and      Sensation:  Light Touch: No apparent deficits    Strength:       Perception:  Inattention/Neglect: Appears  intact    Coordination:  Movements are Fluid and Coordinated: No (Parkinsons history)  Upper Body Coordination:  (Parkinson tremors hands)  Coordination Comment: Pt has decreased FMC and GMC secondary to Parkinsons     Hand Function:  Hand Function  Gross Grasp: Functional    Extremities: RUE   RUE : Exceptions to WFL (50% ROM shoulder  3/5 strength shoulder 4/5 elbow) and LUE   LUE: Exceptions to WFL (50% ROM shoulder 3/5 shoulder strength 4/5 elbow)    Outcome Measures: [facility] Daily Activity  Putting on and taking off regular lower body clothing: A lot  Bathing (including washing, rinsing, drying): A lot  Putting on and taking off regular upper body clothing: A lot  Toileting, which includes using toilet, bedpan or urinal: A lot  Taking care of personal grooming such as brushing teeth: A little  Eating Meals: A little  Daily Activity - Total Score: 14                    EDUCATION:  Education  Individual(s) Educated: Patient, Significant Other  Education Provided: Fall precautons, Risk and benefits of OT discussed with patient or other  Education Documentation  Precautions, taught by Orlando Moctezuma OT at 3/18/2024 12:03 PM.  Learner: Patient  Readiness: Acceptance  Method: Explanation  Response: Verbalizes Understanding  Comment: Pt needs safety cues.    ADL Training, taught by Orlando Moctezuma OT at 3/18/2024 12:03 PM.  Learner: Patient  Readiness: Acceptance  Method: Explanation  Response: Verbalizes Understanding  Comment: Pt needs safety cues.    Education Comments  No comments found.        Goals:   Encounter Problems       Encounter Problems (Active)       Dressing Upper Extremities       STG - 2 (Progressing)       Start:  03/18/24    Expected End:  04/01/24       Pt will perform UE dressing with contact guard assist as able with cues and set up.              Dressings Lower Extremities       STG -1  (Progressing)       Start:  03/18/24    Expected End:  04/01/24       Pt will perform LE dressing with Min  A set up and cues.             Functional Mobility       Goal 3 (Progressing)       Start:  03/18/24    Expected End:  04/01/24       Pt will increase ROM both shoulders as able and increase strength both arms with 1-2# in available range for assist with ADLs. Pt will increase FMC and GMC to F+., G-as able for assist with ADLs.          Goal 4 (Progressing)       Start:  03/18/24    Expected End:  04/01/24       Pt will transfer to chair/ commode with device or arm in arm Mod A of 2 safely.             Toileting       STG - 5 (Progressing)       Start:  03/18/24    Expected End:  04/01/24       Pt will perform toileting tasks as able with Min A set up and cues.

## 2024-03-18 NOTE — NURSING NOTE
1600- Assumed care of patient. Daughter at bedside. Bed alarm on and call light within reach.    EOS- No acute changes since assuming care. No complaints of pain. Safety maintained and call light within reach.

## 2024-03-19 LAB
ANION GAP SERPL CALC-SCNC: 11 MMOL/L (ref 10–20)
APPEARANCE STONE: NORMAL
BUN SERPL-MCNC: 43 MG/DL (ref 6–23)
CALCIUM SERPL-MCNC: 8.7 MG/DL (ref 8.6–10.3)
CHLORIDE SERPL-SCNC: 109 MMOL/L (ref 98–107)
CO2 SERPL-SCNC: 27 MMOL/L (ref 21–32)
COMPN STONE: NORMAL
CREAT SERPL-MCNC: 2.71 MG/DL (ref 0.5–1.3)
EGFRCR SERPLBLD CKD-EPI 2021: 22 ML/MIN/1.73M*2
ERYTHROCYTE [DISTWIDTH] IN BLOOD BY AUTOMATED COUNT: 12.9 % (ref 11.5–14.5)
GLUCOSE BLD MANUAL STRIP-MCNC: 144 MG/DL (ref 74–99)
GLUCOSE BLD MANUAL STRIP-MCNC: 182 MG/DL (ref 74–99)
GLUCOSE BLD MANUAL STRIP-MCNC: 194 MG/DL (ref 74–99)
GLUCOSE BLD MANUAL STRIP-MCNC: 239 MG/DL (ref 74–99)
GLUCOSE BLD MANUAL STRIP-MCNC: 354 MG/DL (ref 74–99)
GLUCOSE SERPL-MCNC: 178 MG/DL (ref 74–99)
HCT VFR BLD AUTO: 34.8 % (ref 41–52)
HGB BLD-MCNC: 10.8 G/DL (ref 13.5–17.5)
MAGNESIUM SERPL-MCNC: 1.64 MG/DL (ref 1.6–2.4)
MCH RBC QN AUTO: 30.3 PG (ref 26–34)
MCHC RBC AUTO-ENTMCNC: 31 G/DL (ref 32–36)
MCV RBC AUTO: 98 FL (ref 80–100)
NRBC BLD-RTO: 0 /100 WBCS (ref 0–0)
PLATELET # BLD AUTO: 184 X10*3/UL (ref 150–450)
POTASSIUM SERPL-SCNC: 3.5 MMOL/L (ref 3.5–5.3)
RBC # BLD AUTO: 3.57 X10*6/UL (ref 4.5–5.9)
SODIUM SERPL-SCNC: 143 MMOL/L (ref 136–145)
SPECIMEN WT: 46 MG
WBC # BLD AUTO: 10 X10*3/UL (ref 4.4–11.3)

## 2024-03-19 PROCEDURE — 2500000002 HC RX 250 W HCPCS SELF ADMINISTERED DRUGS (ALT 637 FOR MEDICARE OP, ALT 636 FOR OP/ED): Performed by: UROLOGY

## 2024-03-19 PROCEDURE — 85027 COMPLETE CBC AUTOMATED: CPT | Performed by: STUDENT IN AN ORGANIZED HEALTH CARE EDUCATION/TRAINING PROGRAM

## 2024-03-19 PROCEDURE — 2500000001 HC RX 250 WO HCPCS SELF ADMINISTERED DRUGS (ALT 637 FOR MEDICARE OP): Performed by: STUDENT IN AN ORGANIZED HEALTH CARE EDUCATION/TRAINING PROGRAM

## 2024-03-19 PROCEDURE — 82947 ASSAY GLUCOSE BLOOD QUANT: CPT

## 2024-03-19 PROCEDURE — 2500000001 HC RX 250 WO HCPCS SELF ADMINISTERED DRUGS (ALT 637 FOR MEDICARE OP): Performed by: UROLOGY

## 2024-03-19 PROCEDURE — 2500000004 HC RX 250 GENERAL PHARMACY W/ HCPCS (ALT 636 FOR OP/ED): Performed by: STUDENT IN AN ORGANIZED HEALTH CARE EDUCATION/TRAINING PROGRAM

## 2024-03-19 PROCEDURE — 36415 COLL VENOUS BLD VENIPUNCTURE: CPT | Performed by: STUDENT IN AN ORGANIZED HEALTH CARE EDUCATION/TRAINING PROGRAM

## 2024-03-19 PROCEDURE — 2500000002 HC RX 250 W HCPCS SELF ADMINISTERED DRUGS (ALT 637 FOR MEDICARE OP, ALT 636 FOR OP/ED): Performed by: STUDENT IN AN ORGANIZED HEALTH CARE EDUCATION/TRAINING PROGRAM

## 2024-03-19 PROCEDURE — 83735 ASSAY OF MAGNESIUM: CPT | Performed by: STUDENT IN AN ORGANIZED HEALTH CARE EDUCATION/TRAINING PROGRAM

## 2024-03-19 PROCEDURE — 2500000001 HC RX 250 WO HCPCS SELF ADMINISTERED DRUGS (ALT 637 FOR MEDICARE OP): Performed by: INTERNAL MEDICINE

## 2024-03-19 PROCEDURE — 2500000004 HC RX 250 GENERAL PHARMACY W/ HCPCS (ALT 636 FOR OP/ED): Performed by: UROLOGY

## 2024-03-19 PROCEDURE — 99233 SBSQ HOSP IP/OBS HIGH 50: CPT | Performed by: STUDENT IN AN ORGANIZED HEALTH CARE EDUCATION/TRAINING PROGRAM

## 2024-03-19 PROCEDURE — 1100000001 HC PRIVATE ROOM DAILY

## 2024-03-19 PROCEDURE — 80048 BASIC METABOLIC PNL TOTAL CA: CPT | Performed by: STUDENT IN AN ORGANIZED HEALTH CARE EDUCATION/TRAINING PROGRAM

## 2024-03-19 RX ORDER — POTASSIUM CHLORIDE 20 MEQ/1
40 TABLET, EXTENDED RELEASE ORAL ONCE
Status: COMPLETED | OUTPATIENT
Start: 2024-03-19 | End: 2024-03-19

## 2024-03-19 RX ORDER — TRAZODONE HYDROCHLORIDE 50 MG/1
25 TABLET ORAL NIGHTLY PRN
Status: DISCONTINUED | OUTPATIENT
Start: 2024-03-19 | End: 2024-03-20

## 2024-03-19 RX ORDER — LANOLIN ALCOHOL/MO/W.PET/CERES
400 CREAM (GRAM) TOPICAL ONCE
Status: COMPLETED | OUTPATIENT
Start: 2024-03-19 | End: 2024-03-19

## 2024-03-19 RX ADMIN — AMOXICILLIN AND CLAVULANATE POTASSIUM 1 TABLET: 500; 125 TABLET, FILM COATED ORAL at 21:13

## 2024-03-19 RX ADMIN — POLYETHYLENE GLYCOL 3350 17 G: 17 POWDER, FOR SOLUTION ORAL at 09:27

## 2024-03-19 RX ADMIN — PRAMIPEXOLE DIHYDROCHLORIDE 0.12 MG: 0.25 TABLET ORAL at 09:27

## 2024-03-19 RX ADMIN — HEPARIN SODIUM 5000 UNITS: 5000 INJECTION INTRAVENOUS; SUBCUTANEOUS at 16:18

## 2024-03-19 RX ADMIN — LEVOTHYROXINE SODIUM 75 MCG: 75 TABLET ORAL at 06:17

## 2024-03-19 RX ADMIN — TRAZODONE HYDROCHLORIDE 25 MG: 50 TABLET ORAL at 23:59

## 2024-03-19 RX ADMIN — MEMANTINE 10 MG: 10 TABLET ORAL at 09:27

## 2024-03-19 RX ADMIN — INSULIN LISPRO 10 UNITS: 100 INJECTION, SOLUTION INTRAVENOUS; SUBCUTANEOUS at 21:14

## 2024-03-19 RX ADMIN — INSULIN LISPRO 4 UNITS: 100 INJECTION, SOLUTION INTRAVENOUS; SUBCUTANEOUS at 12:45

## 2024-03-19 RX ADMIN — MEMANTINE 10 MG: 10 TABLET ORAL at 21:13

## 2024-03-19 RX ADMIN — INSULIN GLARGINE 10 UNITS: 100 INJECTION, SOLUTION SUBCUTANEOUS at 21:14

## 2024-03-19 RX ADMIN — POTASSIUM CHLORIDE 40 MEQ: 1500 TABLET, EXTENDED RELEASE ORAL at 09:27

## 2024-03-19 RX ADMIN — Medication 400 MG: at 09:27

## 2024-03-19 RX ADMIN — CITALOPRAM HYDROBROMIDE 10 MG: 10 TABLET ORAL at 09:27

## 2024-03-19 RX ADMIN — AMOXICILLIN AND CLAVULANATE POTASSIUM 1 TABLET: 500; 125 TABLET, FILM COATED ORAL at 09:27

## 2024-03-19 RX ADMIN — HEPARIN SODIUM 5000 UNITS: 5000 INJECTION INTRAVENOUS; SUBCUTANEOUS at 23:14

## 2024-03-19 RX ADMIN — INSULIN LISPRO 2 UNITS: 100 INJECTION, SOLUTION INTRAVENOUS; SUBCUTANEOUS at 16:18

## 2024-03-19 RX ADMIN — PRAVASTATIN SODIUM 10 MG: 20 TABLET ORAL at 21:12

## 2024-03-19 RX ADMIN — HEPARIN SODIUM 5000 UNITS: 5000 INJECTION INTRAVENOUS; SUBCUTANEOUS at 09:27

## 2024-03-19 RX ADMIN — Medication 5 MG: at 21:13

## 2024-03-19 ASSESSMENT — COGNITIVE AND FUNCTIONAL STATUS - GENERAL
EATING MEALS: A LITTLE
CLIMB 3 TO 5 STEPS WITH RAILING: TOTAL
TOILETING: A LOT
MOVING FROM LYING ON BACK TO SITTING ON SIDE OF FLAT BED WITH BEDRAILS: A LOT
MOVING FROM LYING ON BACK TO SITTING ON SIDE OF FLAT BED WITH BEDRAILS: A LITTLE
PERSONAL GROOMING: A LITTLE
DAILY ACTIVITIY SCORE: 14
TURNING FROM BACK TO SIDE WHILE IN FLAT BAD: A LOT
PERSONAL GROOMING: A LITTLE
HELP NEEDED FOR BATHING: A LOT
TOILETING: A LITTLE
MOVING TO AND FROM BED TO CHAIR: A LOT
WALKING IN HOSPITAL ROOM: A LOT
DAILY ACTIVITIY SCORE: 18
EATING MEALS: A LITTLE
STANDING UP FROM CHAIR USING ARMS: A LITTLE
MOBILITY SCORE: 16
DRESSING REGULAR UPPER BODY CLOTHING: A LITTLE
DRESSING REGULAR LOWER BODY CLOTHING: A LITTLE
HELP NEEDED FOR BATHING: A LITTLE
DRESSING REGULAR UPPER BODY CLOTHING: A LOT
STANDING UP FROM CHAIR USING ARMS: A LOT
MOVING TO AND FROM BED TO CHAIR: A LITTLE
TURNING FROM BACK TO SIDE WHILE IN FLAT BAD: A LITTLE
CLIMB 3 TO 5 STEPS WITH RAILING: A LOT
MOBILITY SCORE: 11
WALKING IN HOSPITAL ROOM: A LOT
DRESSING REGULAR LOWER BODY CLOTHING: A LOT

## 2024-03-19 ASSESSMENT — PAIN SCALES - GENERAL
PAINLEVEL_OUTOF10: 0 - NO PAIN
PAINLEVEL_OUTOF10: 0 - NO PAIN

## 2024-03-19 ASSESSMENT — PAIN - FUNCTIONAL ASSESSMENT
PAIN_FUNCTIONAL_ASSESSMENT: 0-10
PAIN_FUNCTIONAL_ASSESSMENT: 0-10

## 2024-03-19 NOTE — CARE PLAN
The patient's goals for the shift include      The clinical goals for the shift include See care plan      Problem: Safety - Adult  Goal: Free from fall injury  Outcome: Progressing     Problem: Discharge Planning  Goal: Discharge to home or other facility with appropriate resources  Outcome: Progressing     Problem: Chronic Conditions and Co-morbidities  Goal: Patient's chronic conditions and co-morbidity symptoms are monitored and maintained or improved  Outcome: Progressing     Problem: Skin  Goal: Decreased wound size/increased tissue granulation at next dressing change  Outcome: Progressing  Goal: Participates in plan/prevention/treatment measures  Outcome: Progressing  Goal: Prevent/manage excess moisture  Outcome: Progressing  Goal: Prevent/minimize sheer/friction injuries  Outcome: Progressing     Problem: Fall/Injury  Goal: Not fall by end of shift  Outcome: Progressing  Goal: Be free from injury by end of the shift  Outcome: Progressing  Goal: Verbalize understanding of personal risk factors for fall in the hospital  Outcome: Progressing  Goal: Verbalize understanding of risk factor reduction measures to prevent injury from fall in the home  Outcome: Progressing  Goal: Use assistive devices by end of the shift  Outcome: Progressing  Goal: Pace activities to prevent fatigue by end of the shift  Outcome: Progressing     Problem: Diabetes  Goal: Achieve decreasing blood glucose levels by end of shift  Outcome: Progressing  Goal: Maintain glucose levels >70mg/dl to <250mg/dl throughout shift  Outcome: Progressing     Problem: Acute Kidney Failure  Goal: Electrolytes/labs return to normal range  Outcome: Progressing  Goal: No signs of infection  Outcome: Progressing  Goal: No signs of neurosensory, musculoskeletal, cardiac changes  Outcome: Progressing  Goal: Stable weight and I&O  Outcome: Progressing  Goal: Tolerates renal replacement therapy  Outcome: Progressing  Goal: Wean vasopressor/achieve hemodynamic  stability  Outcome: Progressing     Problem: Obstructive: Kidney Stones, Hydronephrosis, BPH  Goal: Achieves normovolemia  Outcome: Progressing  Goal: Relieve obstruction  Outcome: Progressing  Goal: Return of normal voiding pattern  Outcome: Progressing  Goal: Skin/surrounding tissue free from infection/excoriation  Outcome: Progressing     Problem: Dressing Upper Extremities  Goal: STG - 2  Outcome: Progressing     Problem: Dressings Lower Extremities  Goal: STG -1   Outcome: Progressing

## 2024-03-19 NOTE — PROGRESS NOTES
"Rogerio Miller is a 88 y.o. male on day 8 of admission presenting with WINSTON (acute kidney injury) (CMS/[facility]).    Subjective   No overnight events. Denies pain. Tolerating intake. Pt denies chest pain, sob, abd pain, n/v. This AM pt asking this writer \"have you seen my daughter and mother? I haven't seen them for weeks and I want them to visit me\". Tried to discuss with patient that his daughter visited yesterday and was at his bedside, pt does not recall due to baseline confusion and continues to reiterate \"she hasn't been here for weeks\".    Family contacted via telephone and updated today. Daughter was walking into building to visit patient. Conveyed to daughter the discussion with patient this AM regarding visitors.        Objective     Physical Exam  Constitutional: elderly, awake and alert this AM, interactive, no distress, appears comfortable    ENMT: mucous membranes moist   Head/Neck: Neck supple   Respiratory/Thorax: Patent airways, CTAB, on RA, respirations even/nonlabored   Cardiovascular: Regular, rate and rhythm, no murmurs, normal S 1and S 2   Gastrointestinal: Nondistended, soft, nontender, no guarding nor rebound, figueroa with clear urine noted   Musculoskeletal: ROM intact, no joint swelling, normal strength   Extremities: normal extremities, no edema   Neurological: alert and oriented to self, remains mildly confused, intact senses,normal strength, follows commands, no facial droop         Last Recorded Vitals  Blood pressure 108/70, pulse 68, temperature 36.6 °C (97.9 °F), temperature source Temporal, resp. rate 18, height 1.778 m (5' 10\"), weight 75.8 kg (167 lb 1.7 oz), SpO2 98 %.  Intake/Output last 3 Shifts:  I/O last 3 completed shifts:  In: 240 (3.2 mL/kg) [P.O.:240]  Out: 2400 (31.7 mL/kg) [Urine:2400 (0.9 mL/kg/hr)]  Weight: 75.8 kg     Relevant Results  Scheduled medications  amoxicillin-pot clavulanate, 1 tablet, oral, q12h KAMI  citalopram, 10 mg, oral, Daily  heparin (porcine), 5,000 Units, " subcutaneous, q8h KAMI  insulin glargine, 10 Units, subcutaneous, Nightly  insulin lispro, 0-10 Units, subcutaneous, Before meals & nightly  levothyroxine, 75 mcg, oral, Daily before breakfast  melatonin, 5 mg, oral, Nightly  memantine, 10 mg, oral, BID  polyethylene glycol, 17 g, oral, Daily  pramipexole, 0.125 mg, oral, Daily  pravastatin, 10 mg, oral, Nightly      Continuous medications       PRN medications  PRN medications: dextrose 10 % in water (D10W), dextrose, glucagon, hydrALAZINE, scopolamine  Results from last 7 days   Lab Units 03/19/24  0533 03/18/24  0536 03/17/24  0533   WBC AUTO x10*3/uL 10.0 9.7 10.2   RBC AUTO x10*6/uL 3.57* 3.55* 3.88*   HEMOGLOBIN g/dL 10.8* 10.6* 11.7*       Results from last 7 days   Lab Units 03/19/24  0533 03/18/24  0536 03/17/24  0533 03/16/24  0433   SODIUM mmol/L 143 145 147* 144   POTASSIUM mmol/L 3.5 3.5 3.9 4.0   CHLORIDE mmol/L 109* 111* 113* 110*   CO2 mmol/L 27 25 21 23   BUN mg/dL 43* 59* 53* 59*   CREATININE mg/dL 2.71* 3.27* 3.90* 4.93*   CALCIUM mg/dL 8.7 8.9 9.2 8.9   PHOSPHORUS mg/dL  --   --   --  3.9   MAGNESIUM mg/dL 1.64 1.79 1.75 1.85       ECG 12 lead    Result Date: 3/12/2024  Electronic ventricular pacemaker When compared with ECG of 06-OCT-2022 23:43, Previous ECG has undetermined rhythm, needs review    ECG 12 lead    Result Date: 3/12/2024  Electronic ventricular pacemaker When compared with ECG of 11-MAR-2024 12:29, (unconfirmed) No significant change was found    US renal complete    Result Date: 3/11/2024  Interpreted By:  Troy Eng, STUDY: US RENAL COMPLETE;  3/11/2024 9:52 pm   INDICATION: Signs/Symptoms:WINSTON.   COMPARISON: None.   ACCESSION NUMBER(S): [id number]   ORDERING CLINICIAN: CHASTITY FUENTES   TECHNIQUE: Multiple images of the kidneys were obtained  .   FINDINGS: RIGHT KIDNEY: The right kidney measures 12.8 cm  in length. The renal cortical echogenicity and thickness are within normal limits. Hydronephrosis is identified.. Degree  is moderate   LEFT KIDNEY: The left kidney measures 10.7 in length. The renal cortical echogenicity and thickness are within normal limits. No hydronephrosis is present; no evidence of nephrolithiasis.   Decompressed urinary bladder.       Significant right-sided hydronephrosis. This is of unknown significance. Distal obstruction should be considered. Further evaluation is advised with CT imaging.   MACRO: None   Signed by: Troy Eng 3/11/2024 10:29 PM Dictation workstation:   [id number]    XR chest 1 view    Result Date: 3/11/2024  Interpreted By:  Chava Pina, STUDY: XR CHEST 1 VIEW   INDICATION: Signs/Symptoms:Chest Pain.   COMPARISON: October 7, 2022   ACCESSION NUMBER(S): [id number]   ORDERING CLINICIAN: SRIKANTH VASQUEZ   FINDINGS: Cardiomegaly with pacemaker unchanged.   No consolidation, effusion, edema, or pneumothorax. Remote right rib fracture.       No evidence of acute intrathoracic abnormality.   Signed by: Chava Pina 3/11/2024 1:27 PM Dictation workstation:   [id number]       Assessment/Plan   Principal Problem:    WINSTON (acute kidney injury) (CMS/[facility])  Active Problems:    Other hydronephrosis  Hyperkalemia, resolved  Urinary retention  Right hydronephrosis  History of Parkinson with dementia  Hypertension  Hyperlipidemia  History of CAD  on pacemaker    A/P:  Pt is an 88 year old male with PMH of Parkinsons with dementia, HTN, DLD, CAD who was admitted on 3/11 with WINSTON and weakness. Renal US showed hydronephrosis. CT showed ureteral stones. Urology consulted and pt taken to OR on 3/14 for b/l Ureteral stent placement. Renal following. Acute agitation and urinary retention overnight on 3/15-resolved. WINSTON improving. Ongoing dispo planning.     -Blood pressure stable.  -Hyperkalemia resolved status post Lokelma  -Kidney function slightly worse on  3/15 to 6.47 > 6.0 from 6.6 on admission, started on normal saline 60 cc/h, nephrology following-->creatinine improving today to 2.71  -continue IVF  per renal  -Renal ultrasound showed right hydronephrosis, CT scan was did show bilateral kidney stone, urology consulted and now s/p stent placement  bilaterally on 3/14  -Poor urine output expected from obstruction  -Urinary retention on admission s/p Figueroa catheter placed, keep Figueroa for strict I's and O's-->figueroa was removed by patient on 3/15 in the setting of agitation. Pt underwent straight cath x3 overnight on 3/16  -figueroa replaced on 3/16; figueroa to remain in place until urology follow up  -pt required geodon 3/16 early AM for agitation, lethargic during the day on 3/16--now resolved  -UA showed protein and glucose, no casts, ?? UTI started on ceftriaxone pending cx, could not assess if he is symptomatic or not.-->urine culture growing staph epi. Change to augmentin on 3/17 to complete 7 day course  -Telemetry  -Regular diet, encourage oral hydration  -Strict I's and O's  -Avoid nephrotoxic medication  -continue with sliding scale insulin and continue lantus, takes 25 units at night, started with 10 units  -DVT prophylaxis with heparin subcu  -GI prophylaxis not indicated    Dispo: [facility]    Pt's daughter updated via telephone today    CODE STATUS: DNR/DNI-confirmed 3/15 with wife and daughter               Bonny Martinez MD

## 2024-03-19 NOTE — NURSING NOTE
EOS note: No acute changes this shift. Remains Aox 1-2, room air, no tele, x2 stand pivot to chair. Yanez remains in place with adequate output. Continued ACHS accu-checks with coverage. No complaints of pain. Pt resting comfortably in chair with chair alarm on and call light within reach.

## 2024-03-19 NOTE — PROGRESS NOTES
INPATIENT NEPHROLOGY CONSULT PROGRESS NOTE      Patient Name: Rogerio Miller MRN: [id number]  DATE of SERVICE: March 19, 2024  TIME of SERVICE: 12:25 PM  CONSULTING SERVICE: Nephrology    REASON for CONSULT: WINSTON, right sided hydronephrosis     SUBJECTIVE:  Patient seen and evaluated at bedside.  Patient remains confused.  Renal function has been improving.    SUMMARY OF STAY:  Mr. Miller is a 88 y.o. male who presents for eval of WINSTON detected on outpatient labs.   Found to have urinary retention requiring Yanez catheter placement.   Patient with past medical history significant for chronic kidney disease stage IIIa baseline serum creatinine 1.2 to 1.3 mg/L with EGFR of 50-55 mill per minute per 1.73 m² as of last year,  Longstanding history of diabetes mellitus, hypertension, hyperlipidemia, coronary artery disease, atrial fibrillation status post pacemaker for sick sinus syndrome, hypothyroidism.  Labs on presentation noted for acute kidney injury with creatinine up to 6.6 mg deciliter BUN of 76 and drop in GFR to 70 mill per minute per 1.73 m², hyperkalemia potassium 6.4 with mild hemolysis repeat potassium on an ABG still elevated at 5.8. Metabolic acidosis with bicarb of 20 pH of 7.38 with component of respiratory alkalosis. Hemoglobin 12.4 mg/dL close to patient baseline.     ASSESSMENT:  -IWNSTON on CKD IIIa: Multifactorial concern for hemodynamically mediated with bladder outlet obstruction, urinary retention requiring Yanez catheter placement. Renal ultrasound demonstrated right-sided hydronephrosis  -Hyperkalemia, resolved.  -Metabolic acidosis, resolved  -Hypertension, optimally controlled.-    PLAN:  --Renal function has been slowly improving, creatinine is down to 2.7 mg/dL from 3.2 mg/dL and BUN is down to 43 from 59.  -Lites have been stable, serum potassium is around 3.5 and magnesium 1.6.  -Anemia: Hemoglobin is around 10.8.  -Hypertension: Blood pressure has  been optimally controlled with systolic blood pressure mostly in 100s to 120s.  -Status post bilateral double-J stents on 3/14 and plan for bladder stone removal in 3 to 4 weeks with urology.  -Remains nonoliguric with urine output of 1.5 L in the last 24 hours.  -On Augmentin 400/125 twice daily.  -Patient has been on Yanez catheter for urinary retention.  Management per urology.  -Discussed with the primary attending.  Will follow, thank you!    Medications:    Current Facility-Administered Medications:     amoxicillin-pot clavulanate (Augmentin) 500-125 mg per tablet 1 tablet, 1 tablet, oral, q12h [facility], Bonny Martinez MD, 1 tablet at 03/19/24 0927    citalopram (CeleXA) tablet 10 mg, 10 mg, oral, Daily, Stephen Trevizo MD, 10 mg at 03/19/24 0927    dextrose 10 % in water (D10W) infusion, 0.3 g/kg/hr, intravenous, Once PRN, Stephen Trevizo MD    dextrose 50 % injection 25 g, 25 g, intravenous, q15 min PRN, Stephen Trevizo MD    glucagon (Glucagen) injection 1 mg, 1 mg, intramuscular, q15 min PRN, Stephen Trevizo MD    heparin (porcine) injection 5,000 Units, 5,000 Units, subcutaneous, q8h KAMI, Stephen Trevizo MD, 5,000 Units at 03/19/24 0927    hydrALAZINE (Apresoline) injection 10 mg, 10 mg, intravenous, q6h PRN, Stephen Trevizo MD, 10 mg at 03/14/24 0017    insulin glargine (Lantus) injection 10 Units, 10 Units, subcutaneous, Nightly, Stephen Trevizo MD, 10 Units at 03/18/24 2104    insulin lispro (HumaLOG) injection 0-10 Units, 0-10 Units, subcutaneous, Before meals & nightly, Stephen Trevizo MD, 2 Units at 03/18/24 2100    levothyroxine (Synthroid, Levoxyl) tablet 75 mcg, 75 mcg, oral, Daily before breakfast, Stephen Trevizo MD, 75 mcg at 03/19/24 0617    melatonin tablet 5 mg, 5 mg, oral, Nightly, Stephen Trevizo MD, 5 mg at 03/18/24 2106    memantine (Namenda) tablet 10 mg, 10 mg, oral, BID, Stephen Trevizo MD, 10 mg at 03/19/24 0927    polyethylene glycol (Glycolax, Miralax) packet 17 g, 17 g, oral,  Daily, Stephen Trevizo MD, 17 g at 03/19/24 0927    pramipexole (Mirapex) tablet 0.125 mg, 0.125 mg, oral, Daily, Stephen Trevizo MD, 0.125 mg at 03/19/24 0927    pravastatin (Pravachol) tablet 10 mg, 10 mg, oral, Nightly, Stephen Trevizo MD, 10 mg at 03/18/24 2106    scopolamine (Transderm-Scop) patch 1 patch, 1 patch, transdermal, q72h PRN, Stephen Trevizo MD, 1 patch at 03/18/24 2104    PERTINENT ROS:  Unable to gather.  Physical Exam:  Vital signs in last 24 hours:  Temp:  [36.1 °C (97 °F)-36.6 °C (97.9 °F)] 36.6 °C (97.9 °F)  Heart Rate:  [] 68  Resp:  [16-20] 18  BP: (107-141)/(70-94) 108/70    General: Has been lethargic.  HEENT:  NCAT,  mucous membranes moist and pink  NECK: No JVD, no carotid bruit, supple, no cervical mass or thyromegaly  LUNGS;  Diminished breath sounds, no Rales  CV:  Distant, regular rate and rhythm, no murmurs  ABDOMEN:  abdomen soft, nontender, BS normal, no masses or organomegaly  EDEMA: No lower extremity edema/dependent edema  SKIN:  dry and normal turgor, no clubbing, cyanosis or petechia.  No rashes noted    Intake/Output last 3 shifts:  I/O last 3 completed shifts:  In: 240 (3.2 mL/kg) [P.O.:240]  Out: 2400 (31.7 mL/kg) [Urine:2400 (0.9 mL/kg/hr)]  Weight: 75.8 kg     DATA:  Diagnotic tests reviewed for Todays visit:  Results from last 7 days   Lab Units 03/19/24  0533   WBC AUTO x10*3/uL 10.0   RBC AUTO x10*6/uL 3.57*   HEMOGLOBIN g/dL 10.8*   HEMATOCRIT % 34.8*     Results from last 7 days   Lab Units 03/19/24  0533 03/17/24  0533 03/16/24  0433   SODIUM mmol/L 143   < > 144   POTASSIUM mmol/L 3.5   < > 4.0   CHLORIDE mmol/L 109*   < > 110*   CO2 mmol/L 27   < > 23   BUN mg/dL 43*   < > 59*   CREATININE mg/dL 2.71*   < > 4.93*   CALCIUM mg/dL 8.7   < > 8.9   PHOSPHORUS mg/dL  --   --  3.9   MAGNESIUM mg/dL 1.64   < > 1.85    < > = values in this interval not displayed.     Results from last 7 days   Lab Units 03/13/24  0245   COLOR U  Yellow   APPEARANCE U  Hazy*   PH U   8.0   SPEC GRAV UR  1.010   PROTEIN U mg/dL 30 (1+)*   BLOOD UR  MODERATE (2+)*   NITRITE U  NEGATIVE   WBC UR /HPF >50*       Us Kidney   INDINGS:  RIGHT KIDNEY:  The right kidney measures 12.8 cm  in length. The renal cortical  echogenicity and thickness are within normal limits. Hydronephrosis  is identified.. Degree is moderate      LEFT KIDNEY:  The left kidney measures 10.7 in length. The renal cortical  echogenicity and thickness are within normal limits. No  hydronephrosis is present; no evidence of nephrolithiasis.      Decompressed urinary bladder.      IMPRESSION:  Significant right-sided hydronephrosis. This is of unknown  significance. Distal obstruction should be considered. Further  evaluation is advised with CT imaging.      CXR  FINDINGS:  Cardiomegaly with pacemaker unchanged.      No consolidation, effusion, edema, or pneumothorax. Remote right rib  fracture.      IMPRESSION:  No evidence of acute intrathoracic abnormality  IMAGING: CXR reviewed in  images      SIGNATURE: Nader Dunbar MD  Nephrology and Hypertension  [address] [facility] [place]  Office phone: [phone]  FAX: [phone]    This note was partially generated using the Dragon voice recognition system, and there may be some incorrect words, spelling's and punctuation that were not noted in checking the note before saving.

## 2024-03-19 NOTE — CARE PLAN
The patient's goals for the shift include      The clinical goals for the shift include see care plan    Problem: Safety - Adult  Goal: Free from fall injury  Outcome: Progressing     Problem: Discharge Planning  Goal: Discharge to home or other facility with appropriate resources  Outcome: Progressing     Problem: Chronic Conditions and Co-morbidities  Goal: Patient's chronic conditions and co-morbidity symptoms are monitored and maintained or improved  Outcome: Progressing     Problem: Skin  Goal: Decreased wound size/increased tissue granulation at next dressing change  Outcome: Progressing  Goal: Participates in plan/prevention/treatment measures  Outcome: Progressing  Goal: Prevent/manage excess moisture  Outcome: Progressing  Goal: Prevent/minimize sheer/friction injuries  Outcome: Progressing     Problem: Fall/Injury  Goal: Not fall by end of shift  Outcome: Progressing  Goal: Be free from injury by end of the shift  Outcome: Progressing     Problem: Diabetes  Goal: Achieve decreasing blood glucose levels by end of shift  Outcome: Progressing  Goal: Maintain glucose levels >70mg/dl to <250mg/dl throughout shift  Outcome: Progressing     Problem: Acute Kidney Failure  Goal: Electrolytes/labs return to normal range  Outcome: Progressing  Goal: No signs of infection  Outcome: Progressing  Goal: No signs of neurosensory, musculoskeletal, cardiac changes  Outcome: Progressing  Goal: Stable weight and I&O  Outcome: Progressing  Goal: Tolerates renal replacement therapy  Outcome: Progressing  Goal: Wean vasopressor/achieve hemodynamic stability  Outcome: Progressing     Problem: Infective UTI/pyelonephritis  Goal: Follows fluid restrictions or becomes normovolemic  Outcome: Progressing  Goal: Manages/understands urgency, continence  Outcome: Progressing  Goal: No signs of neurosensory, musculoskeletal, cardiac changes  Outcome: Progressing  Goal: No worsening signs of infection  Outcome: Progressing  Goal: Stable  weight and I&O  Outcome: Progressing     Problem: Obstructive: Kidney Stones, Hydronephrosis, BPH  Goal: Achieves normovolemia  Outcome: Progressing  Goal: Relieve obstruction  Outcome: Progressing  Goal: Return of normal voiding pattern  Outcome: Progressing  Goal: Skin/surrounding tissue free from infection/excoriation  Outcome: Progressing     Problem: Dressings Lower Extremities  Goal: STG -1   Outcome: Progressing     Problem: Dressing Upper Extremities  Goal: STG - 2  Outcome: Progressing     Problem: Toileting  Goal: STG - 5  Outcome: Progressing   Pt is alert to self only,very confused. He was restless for most of the night,finally settled down and went to sleep at 0330. Heart rate elevated while restless and awake. He complained of nausea,placed scopolamine patch behind right ear and has had no further complaints. Attempted to stand and walk at bedside but was unable with assist of two and walker. Had difficulty lifting legs to walk. Bed alarm is on,safety maintained

## 2024-03-20 ENCOUNTER — APPOINTMENT (OUTPATIENT)
Dept: CARDIOLOGY | Facility: HOSPITAL | Age: 89
DRG: 659 | End: 2024-03-20
Payer: MEDICARE

## 2024-03-20 LAB
ANION GAP SERPL CALC-SCNC: 15 MMOL/L (ref 10–20)
BASOPHILS # BLD AUTO: 0.07 X10*3/UL (ref 0–0.1)
BASOPHILS NFR BLD AUTO: 0.7 %
BUN SERPL-MCNC: 40 MG/DL (ref 6–23)
CALCIUM SERPL-MCNC: 9 MG/DL (ref 8.6–10.3)
CHLORIDE SERPL-SCNC: 107 MMOL/L (ref 98–107)
CO2 SERPL-SCNC: 22 MMOL/L (ref 21–32)
CREAT SERPL-MCNC: 2.51 MG/DL (ref 0.5–1.3)
EGFRCR SERPLBLD CKD-EPI 2021: 24 ML/MIN/1.73M*2
EOSINOPHIL # BLD AUTO: 0.53 X10*3/UL (ref 0–0.4)
EOSINOPHIL NFR BLD AUTO: 5.1 %
ERYTHROCYTE [DISTWIDTH] IN BLOOD BY AUTOMATED COUNT: 13.1 % (ref 11.5–14.5)
ERYTHROCYTE [DISTWIDTH] IN BLOOD BY AUTOMATED COUNT: 13.1 % (ref 11.5–14.5)
GLUCOSE BLD MANUAL STRIP-MCNC: 111 MG/DL (ref 74–99)
GLUCOSE BLD MANUAL STRIP-MCNC: 131 MG/DL (ref 74–99)
GLUCOSE BLD MANUAL STRIP-MCNC: 190 MG/DL (ref 74–99)
GLUCOSE BLD MANUAL STRIP-MCNC: 211 MG/DL (ref 74–99)
GLUCOSE SERPL-MCNC: 171 MG/DL (ref 74–99)
HCT VFR BLD AUTO: 35.5 % (ref 41–52)
HCT VFR BLD AUTO: 38 % (ref 41–52)
HGB BLD-MCNC: 11 G/DL (ref 13.5–17.5)
HGB BLD-MCNC: 11.5 G/DL (ref 13.5–17.5)
IMM GRANULOCYTES # BLD AUTO: 0.06 X10*3/UL (ref 0–0.5)
IMM GRANULOCYTES NFR BLD AUTO: 0.6 % (ref 0–0.9)
LYMPHOCYTES # BLD AUTO: 1 X10*3/UL (ref 0.8–3)
LYMPHOCYTES NFR BLD AUTO: 9.6 %
MAGNESIUM SERPL-MCNC: 1.85 MG/DL (ref 1.6–2.4)
MCH RBC QN AUTO: 30 PG (ref 26–34)
MCH RBC QN AUTO: 30.1 PG (ref 26–34)
MCHC RBC AUTO-ENTMCNC: 30.3 G/DL (ref 32–36)
MCHC RBC AUTO-ENTMCNC: 31 G/DL (ref 32–36)
MCV RBC AUTO: 97 FL (ref 80–100)
MCV RBC AUTO: 99 FL (ref 80–100)
MONOCYTES # BLD AUTO: 0.61 X10*3/UL (ref 0.05–0.8)
MONOCYTES NFR BLD AUTO: 5.9 %
NEUTROPHILS # BLD AUTO: 8.14 X10*3/UL (ref 1.6–5.5)
NEUTROPHILS NFR BLD AUTO: 78.1 %
NRBC BLD-RTO: 0 /100 WBCS (ref 0–0)
NRBC BLD-RTO: 0 /100 WBCS (ref 0–0)
PLATELET # BLD AUTO: 175 X10*3/UL (ref 150–450)
PLATELET # BLD AUTO: 178 X10*3/UL (ref 150–450)
POTASSIUM SERPL-SCNC: 4.3 MMOL/L (ref 3.5–5.3)
RBC # BLD AUTO: 3.65 X10*6/UL (ref 4.5–5.9)
RBC # BLD AUTO: 3.83 X10*6/UL (ref 4.5–5.9)
SODIUM SERPL-SCNC: 140 MMOL/L (ref 136–145)
WBC # BLD AUTO: 10.4 X10*3/UL (ref 4.4–11.3)
WBC # BLD AUTO: 13.4 X10*3/UL (ref 4.4–11.3)

## 2024-03-20 PROCEDURE — 2500000001 HC RX 250 WO HCPCS SELF ADMINISTERED DRUGS (ALT 637 FOR MEDICARE OP): Performed by: UROLOGY

## 2024-03-20 PROCEDURE — 80048 BASIC METABOLIC PNL TOTAL CA: CPT | Performed by: STUDENT IN AN ORGANIZED HEALTH CARE EDUCATION/TRAINING PROGRAM

## 2024-03-20 PROCEDURE — 93005 ELECTROCARDIOGRAM TRACING: CPT

## 2024-03-20 PROCEDURE — 83735 ASSAY OF MAGNESIUM: CPT | Performed by: STUDENT IN AN ORGANIZED HEALTH CARE EDUCATION/TRAINING PROGRAM

## 2024-03-20 PROCEDURE — 2500000004 HC RX 250 GENERAL PHARMACY W/ HCPCS (ALT 636 FOR OP/ED): Performed by: INTERNAL MEDICINE

## 2024-03-20 PROCEDURE — 36415 COLL VENOUS BLD VENIPUNCTURE: CPT | Performed by: STUDENT IN AN ORGANIZED HEALTH CARE EDUCATION/TRAINING PROGRAM

## 2024-03-20 PROCEDURE — 1100000001 HC PRIVATE ROOM DAILY

## 2024-03-20 PROCEDURE — 2500000004 HC RX 250 GENERAL PHARMACY W/ HCPCS (ALT 636 FOR OP/ED): Performed by: UROLOGY

## 2024-03-20 PROCEDURE — 2500000002 HC RX 250 W HCPCS SELF ADMINISTERED DRUGS (ALT 637 FOR MEDICARE OP, ALT 636 FOR OP/ED): Performed by: UROLOGY

## 2024-03-20 PROCEDURE — 85025 COMPLETE CBC W/AUTO DIFF WBC: CPT | Performed by: STUDENT IN AN ORGANIZED HEALTH CARE EDUCATION/TRAINING PROGRAM

## 2024-03-20 PROCEDURE — 85027 COMPLETE CBC AUTOMATED: CPT | Performed by: STUDENT IN AN ORGANIZED HEALTH CARE EDUCATION/TRAINING PROGRAM

## 2024-03-20 PROCEDURE — 93010 ELECTROCARDIOGRAM REPORT: CPT | Performed by: INTERNAL MEDICINE

## 2024-03-20 PROCEDURE — 2500000001 HC RX 250 WO HCPCS SELF ADMINISTERED DRUGS (ALT 637 FOR MEDICARE OP): Performed by: STUDENT IN AN ORGANIZED HEALTH CARE EDUCATION/TRAINING PROGRAM

## 2024-03-20 PROCEDURE — 82947 ASSAY GLUCOSE BLOOD QUANT: CPT

## 2024-03-20 PROCEDURE — 99233 SBSQ HOSP IP/OBS HIGH 50: CPT | Performed by: STUDENT IN AN ORGANIZED HEALTH CARE EDUCATION/TRAINING PROGRAM

## 2024-03-20 RX ORDER — DIPHENHYDRAMINE HCL 25 MG
25 TABLET ORAL NIGHTLY PRN
Status: DISCONTINUED | OUTPATIENT
Start: 2024-03-20 | End: 2024-03-21 | Stop reason: HOSPADM

## 2024-03-20 RX ORDER — HALOPERIDOL 5 MG/ML
2 INJECTION INTRAMUSCULAR ONCE
Status: COMPLETED | OUTPATIENT
Start: 2024-03-20 | End: 2024-03-20

## 2024-03-20 RX ADMIN — HEPARIN SODIUM 5000 UNITS: 5000 INJECTION INTRAVENOUS; SUBCUTANEOUS at 23:13

## 2024-03-20 RX ADMIN — LEVOTHYROXINE SODIUM 75 MCG: 75 TABLET ORAL at 10:07

## 2024-03-20 RX ADMIN — INSULIN LISPRO 4 UNITS: 100 INJECTION, SOLUTION INTRAVENOUS; SUBCUTANEOUS at 12:06

## 2024-03-20 RX ADMIN — HEPARIN SODIUM 5000 UNITS: 5000 INJECTION INTRAVENOUS; SUBCUTANEOUS at 17:05

## 2024-03-20 RX ADMIN — HALOPERIDOL LACTATE 2 MG: 5 INJECTION, SOLUTION INTRAMUSCULAR at 03:57

## 2024-03-20 RX ADMIN — MEMANTINE 10 MG: 10 TABLET ORAL at 21:16

## 2024-03-20 RX ADMIN — AMOXICILLIN AND CLAVULANATE POTASSIUM 1 TABLET: 500; 125 TABLET, FILM COATED ORAL at 10:07

## 2024-03-20 RX ADMIN — PRAMIPEXOLE DIHYDROCHLORIDE 0.12 MG: 0.25 TABLET ORAL at 10:06

## 2024-03-20 RX ADMIN — CITALOPRAM HYDROBROMIDE 10 MG: 10 TABLET ORAL at 10:07

## 2024-03-20 RX ADMIN — PRAVASTATIN SODIUM 10 MG: 20 TABLET ORAL at 21:16

## 2024-03-20 RX ADMIN — MEMANTINE 10 MG: 10 TABLET ORAL at 10:07

## 2024-03-20 RX ADMIN — Medication 5 MG: at 21:16

## 2024-03-20 RX ADMIN — INSULIN GLARGINE 10 UNITS: 100 INJECTION, SOLUTION SUBCUTANEOUS at 21:16

## 2024-03-20 RX ADMIN — HEPARIN SODIUM 5000 UNITS: 5000 INJECTION INTRAVENOUS; SUBCUTANEOUS at 10:07

## 2024-03-20 ASSESSMENT — COGNITIVE AND FUNCTIONAL STATUS - GENERAL
TURNING FROM BACK TO SIDE WHILE IN FLAT BAD: A LITTLE
TOILETING: A LITTLE
DRESSING REGULAR UPPER BODY CLOTHING: A LITTLE
TURNING FROM BACK TO SIDE WHILE IN FLAT BAD: A LITTLE
DRESSING REGULAR UPPER BODY CLOTHING: A LITTLE
STANDING UP FROM CHAIR USING ARMS: A LITTLE
DAILY ACTIVITIY SCORE: 18
TOILETING: A LITTLE
PERSONAL GROOMING: A LITTLE
EATING MEALS: A LITTLE
MOVING TO AND FROM BED TO CHAIR: A LITTLE
DRESSING REGULAR LOWER BODY CLOTHING: A LITTLE
WALKING IN HOSPITAL ROOM: A LOT
MOBILITY SCORE: 16
WALKING IN HOSPITAL ROOM: A LOT
CLIMB 3 TO 5 STEPS WITH RAILING: A LOT
MOBILITY SCORE: 16
CLIMB 3 TO 5 STEPS WITH RAILING: A LOT
MOVING FROM LYING ON BACK TO SITTING ON SIDE OF FLAT BED WITH BEDRAILS: A LITTLE
DRESSING REGULAR LOWER BODY CLOTHING: A LITTLE
MOVING FROM LYING ON BACK TO SITTING ON SIDE OF FLAT BED WITH BEDRAILS: A LITTLE
DAILY ACTIVITIY SCORE: 18
MOVING TO AND FROM BED TO CHAIR: A LITTLE
STANDING UP FROM CHAIR USING ARMS: A LITTLE
EATING MEALS: A LITTLE
HELP NEEDED FOR BATHING: A LITTLE
HELP NEEDED FOR BATHING: A LITTLE
PERSONAL GROOMING: A LITTLE

## 2024-03-20 ASSESSMENT — PAIN SCALES - GENERAL
PAINLEVEL_OUTOF10: 0 - NO PAIN
PAINLEVEL_OUTOF10: 0 - NO PAIN

## 2024-03-20 NOTE — CARE PLAN
The patient's goals for the shift include      The clinical goals for the shift include See plan of care 3/20    Patient has done well throughout shift.  Cooperative with staff and visited by his daughter.  Authorization has been obtained for ONNO and will likely DC tomorrow.  Daughter has been updated on plan of care.

## 2024-03-20 NOTE — PROGRESS NOTES
03/20/24 1541   Discharge Planning   Home or Post Acute Services Post acute facilities (Rehab/SNF/etc)   Type of Post Acute Facility Services Skilled nursing   Patient expects to be discharged to: [facility] is still pending.

## 2024-03-20 NOTE — PROGRESS NOTES
"Rogerio Miller is a 88 y.o. male on day 9 of admission presenting with WINSTON (acute kidney injury) (CMS/HCC).    Subjective   Failed void trial 3/16 and has been pulling at figueroa a small amount       Objective     Physical Exam  Figueroa---secured and celar yellow    Last Recorded Vitals  Blood pressure (!) 133/92, pulse 84, temperature 36.5 °C (97.7 °F), temperature source Temporal, resp. rate 18, height 1.778 m (5' 10\"), weight 75.8 kg (167 lb 1.7 oz), SpO2 98 %.  Intake/Output last 3 Shifts:  I/O last 3 completed shifts:  In: 480 (6.3 mL/kg) [P.O.:480]  Out: 2200 (29 mL/kg) [Urine:2200 (0.8 mL/kg/hr)]  Weight: 75.8 kg     Relevant Results                             Assessment/Plan   Principal Problem:    WINSTON (acute kidney injury) (CMS/HCC)  Active Problems:    Other hydronephrosis    S/p b/l jj stents 3/14/24     Failed void trial 3/16; plan snf w/ figueroa but may need to repeat void trial if pulling at catehter again     Dispostion---will need fu b/l urs/stone package and residual bladder stone removal in 3-4 weeks once winston/uti resolved.---to call my office to arranage this fu surgery time/date.                Stephen Trevizo MD      "

## 2024-03-20 NOTE — PROGRESS NOTES
"Rogerio Miller is a 88 y.o. male on day 9 of admission presenting with WINSTON (acute kidney injury) (CMS/[facility]).    Subjective   Agitated overnight and received trazodone and then haldol. Pt somewhat lethargic this AM but did briefly awaken to follow commands during exam. Denied pain    Family contacted via telephone and updated today. Daughter reports when she visited this AM, pt was alert, interactive but did seem \"a little woozy when the aides were cleaning him up\". She reports pt ate a good breakfast.        Objective     Physical Exam  Constitutional: elderly, sleepy this AM but easily awakened, interactive, no distress, appears comfortable    ENMT: mucous membranes moist   Head/Neck: Neck supple   Respiratory/Thorax: Patent airways, CTAB, on RA, respirations even/nonlabored   Cardiovascular: Regular, rate and rhythm, no murmurs, normal S 1and S 2   Gastrointestinal: Nondistended, soft, nontender, no guarding nor rebound, figueroa with clear urine noted   Musculoskeletal: ROM intact, no joint swelling, normal strength   Extremities: normal extremities, no edema   Neurological: alert and oriented to self, remains mildly confused, intact senses,normal strength, follows commands, no facial droop         Last Recorded Vitals  Blood pressure 138/88, pulse (!) 112, temperature 36.1 °C (97 °F), temperature source Temporal, resp. rate 16, height 1.778 m (5' 10\"), weight 75.8 kg (167 lb 1.7 oz), SpO2 97 %.  Intake/Output last 3 Shifts:  I/O last 3 completed shifts:  In: 480 (6.3 mL/kg) [P.O.:480]  Out: 2200 (29 mL/kg) [Urine:2200 (0.8 mL/kg/hr)]  Weight: 75.8 kg     Relevant Results  Scheduled medications  citalopram, 10 mg, oral, Daily  heparin (porcine), 5,000 Units, subcutaneous, q8h KAMI  insulin glargine, 10 Units, subcutaneous, Nightly  insulin lispro, 0-10 Units, subcutaneous, Before meals & nightly  levothyroxine, 75 mcg, oral, Daily before breakfast  melatonin, 5 mg, oral, Nightly  memantine, 10 mg, oral, BID  polyethylene " glycol, 17 g, oral, Daily  pramipexole, 0.125 mg, oral, Daily  pravastatin, 10 mg, oral, Nightly      Continuous medications       PRN medications  PRN medications: dextrose 10 % in water (D10W), dextrose, glucagon, hydrALAZINE, scopolamine, traZODone  Results from last 7 days   Lab Units 03/20/24  1403 03/20/24  0550 03/19/24  0533   WBC AUTO x10*3/uL 10.4 13.4* 10.0   RBC AUTO x10*6/uL 3.65* 3.83* 3.57*   HEMOGLOBIN g/dL 11.0* 11.5* 10.8*       Results from last 7 days   Lab Units 03/20/24  0550 03/19/24  0533 03/18/24  0536 03/17/24  0533 03/16/24  0433   SODIUM mmol/L 140 143 145   < > 144   POTASSIUM mmol/L 4.3 3.5 3.5   < > 4.0   CHLORIDE mmol/L 107 109* 111*   < > 110*   CO2 mmol/L 22 27 25   < > 23   BUN mg/dL 40* 43* 59*   < > 59*   CREATININE mg/dL 2.51* 2.71* 3.27*   < > 4.93*   CALCIUM mg/dL 9.0 8.7 8.9   < > 8.9   PHOSPHORUS mg/dL  --   --   --   --  3.9   MAGNESIUM mg/dL 1.85 1.64 1.79   < > 1.85    < > = values in this interval not displayed.       ECG 12 lead    Result Date: 3/12/2024  Electronic ventricular pacemaker When compared with ECG of 06-OCT-2022 23:43, Previous ECG has undetermined rhythm, needs review    ECG 12 lead    Result Date: 3/12/2024  Electronic ventricular pacemaker When compared with ECG of 11-MAR-2024 12:29, (unconfirmed) No significant change was found    US renal complete    Result Date: 3/11/2024  Interpreted By:  Troy Eng, STUDY: US RENAL COMPLETE;  3/11/2024 9:52 pm   INDICATION: Signs/Symptoms:WINSTON.   COMPARISON: None.   ACCESSION NUMBER(S): [id number]   ORDERING CLINICIAN: CHASTITY FUENTES   TECHNIQUE: Multiple images of the kidneys were obtained  .   FINDINGS: RIGHT KIDNEY: The right kidney measures 12.8 cm  in length. The renal cortical echogenicity and thickness are within normal limits. Hydronephrosis is identified.. Degree is moderate   LEFT KIDNEY: The left kidney measures 10.7 in length. The renal cortical echogenicity and thickness are within normal limits.  No hydronephrosis is present; no evidence of nephrolithiasis.   Decompressed urinary bladder.       Significant right-sided hydronephrosis. This is of unknown significance. Distal obstruction should be considered. Further evaluation is advised with CT imaging.   MACRO: None   Signed by: Troy Eng 3/11/2024 10:29 PM Dictation workstation:   [id number]    XR chest 1 view    Result Date: 3/11/2024  Interpreted By:  Chava Pina, STUDY: XR CHEST 1 VIEW   INDICATION: Signs/Symptoms:Chest Pain.   COMPARISON: October 7, 2022   ACCESSION NUMBER(S): [id number]   ORDERING CLINICIAN: SRIKANTH VASQUEZ   FINDINGS: Cardiomegaly with pacemaker unchanged.   No consolidation, effusion, edema, or pneumothorax. Remote right rib fracture.       No evidence of acute intrathoracic abnormality.   Signed by: Chava Pina 3/11/2024 1:27 PM Dictation workstation:   [id number]       Assessment/Plan   Principal Problem:    WINSTON (acute kidney injury) (CMS/HCC)  Active Problems:    Other hydronephrosis  Hyperkalemia, resolved  Urinary retention  Right hydronephrosis  History of Parkinson with dementia  Hypertension  Hyperlipidemia  History of CAD  on pacemaker    A/P:  Pt is an 88 year old male with PMH of Parkinsons with dementia, HTN, DLD, CAD who was admitted on 3/11 with WINSTON and weakness. Renal US showed hydronephrosis. CT showed ureteral stones. Urology consulted and pt taken to OR on 3/14 for b/l Ureteral stent placement. Renal following. Acute agitation and urinary retention overnight on 3/15-resolved. WINSTON improving. Ongoing dispo planning.     -Blood pressure stable.  -Hyperkalemia resolved status post Lokelma  -Kidney function slightly worse on  3/15 to 6.47 > 6.0 from 6.6 on admission, started on normal saline 60 cc/h, nephrology following-->creatinine improving today to 2.51  -this AM leukocytosis to 13K--pt afebrile, no evidence of new infection. Daughter informed.  -cbc repeated and leukocytosis resolved  -low suspicion of  new infection  -will trend AM CBC  -continue IVF per renal  -Renal ultrasound showed right hydronephrosis, CT scan was did show bilateral kidney stone, urology consulted and now s/p stent placement bilaterally on 3/14  -Poor urine output expected from obstruction  -Urinary retention on admission s/p Figueroa catheter placed, keep Figueroa for strict I's and O's-->figueroa was removed by patient on 3/15 in the setting of agitation. Pt underwent straight cath x3 overnight on 3/16  -figueroa replaced on 3/16; figueroa to remain in place until urology follow up  -pt required geodon 3/16 early AM for agitation, lethargic during the day on 3/16--now resolved  -recurrent agitation overnight and pt received haldol; qtc now 572--avoid qt prolonging agents  -trazodone discontinued  -can use benadryl at night for sleep  -if recurrent agitation may need to consider very low dose benzos (though not ideal given age)  -UA showed protein and glucose, no casts, ?? UTI started on ceftriaxone pending cx, could not assess if he is symptomatic or not.-->urine culture growing staph epi. Change to augmentin on 3/17 to complete 7 day course  -Telemetry  -Regular diet, encourage oral hydration  -Strict I's and O's  -Avoid nephrotoxic medication  -continue with sliding scale insulin and continue lantus, takes 25 units at night, started with 10 units  -DVT prophylaxis with heparin subcu  -GI prophylaxis not indicated    Dispo: [facility]    Pt's daughter updated via telephone today    CODE STATUS: DNR/DNI-confirmed 3/15 with wife and daughter               Bonny Martinez MD

## 2024-03-20 NOTE — PROGRESS NOTES
Occupational Therapy                 Therapy Communication Note    Patient Name: Rogerio Miller  MRN: [id number]  Today's Date: 3/20/2024     Discipline: Occupational Therapy    Missed Visit Reason: Missed Visit Reason: Patient sleeping (per nurse, patient is agitated and not appropriate at this time. patient sleeping and she wished he be left alone for now and to try back later if appropriate.)    Missed Time: Attempt    Comment:

## 2024-03-20 NOTE — PROGRESS NOTES
Received auth for ONNO--good through 3/25--attending updated-states likely ready tomorrow morning. [facility] made aware.       Razia Mondragon RN

## 2024-03-20 NOTE — PROGRESS NOTES
INPATIENT NEPHROLOGY CONSULT PROGRESS NOTE      Patient Name: Rogerio Miller MRN: [id number]  DATE of SERVICE: March 20, 2024  TIME of SERVICE: 2:33 PM  CONSULTING SERVICE: Nephrology    REASON for CONSULT: WINSTON, right sided hydronephrosis     SUBJECTIVE:  Patient seen and evaluated at bedside.  Patient remains confused.  Renal function has been improving.    SUMMARY OF STAY:  Mr. Miller is a 88 y.o. male who presents for eval of WINSTON detected on outpatient labs.   Found to have urinary retention requiring Yanez catheter placement.   Patient with past medical history significant for chronic kidney disease stage IIIa baseline serum creatinine 1.2 to 1.3 mg/L with EGFR of 50-55 mill per minute per 1.73 m² as of last year,  Longstanding history of diabetes mellitus, hypertension, hyperlipidemia, coronary artery disease, atrial fibrillation status post pacemaker for sick sinus syndrome, hypothyroidism.  Labs on presentation noted for acute kidney injury with creatinine up to 6.6 mg deciliter BUN of 76 and drop in GFR to 70 mill per minute per 1.73 m², hyperkalemia potassium 6.4 with mild hemolysis repeat potassium on an ABG still elevated at 5.8. Metabolic acidosis with bicarb of 20 pH of 7.38 with component of respiratory alkalosis. Hemoglobin 12.4 mg/dL close to patient baseline.     ASSESSMENT:  -WINSTON on CKD IIIa: Multifactorial concern for hemodynamically mediated with bladder outlet obstruction, urinary retention requiring Yanez catheter placement. Renal ultrasound demonstrated right-sided hydronephrosis  -Hyperkalemia, resolved.  -Metabolic acidosis, resolved  -Hypertension, optimally controlled.-    PLAN:  ---Renal function has been improving slowly, creatinine is down to 2.5 mg/dL from 2.7 mg/dL and BUN is down to 40.  Electrolytes have been stable.  -Mild leukocytosis with WBC up to 13 K.  -Hemoglobin stable around 11.5.  -Remains nonoliguric with urine output of 650  cc in the last 24 hours.  -Has been slightly tachycardic.  Monitor white count and workup for infectious source if remains elevated.  --Status post bilateral double-J stents on 3/14 and plan for bladder stone removal in 3 to 4 weeks with urology.  -Patient has been on Yanez catheter for urinary retention.  Management per urology.      Will follow, thank you!    Medications:    Current Facility-Administered Medications:     citalopram (CeleXA) tablet 10 mg, 10 mg, oral, Daily, Stephen Trevizo MD, 10 mg at 03/20/24 1007    dextrose 10 % in water (D10W) infusion, 0.3 g/kg/hr, intravenous, Once PRN, Stephen Trevizo MD    dextrose 50 % injection 25 g, 25 g, intravenous, q15 min PRN, Stephen Trevizo MD    glucagon (Glucagen) injection 1 mg, 1 mg, intramuscular, q15 min PRN, Stephen Trevizo MD    heparin (porcine) injection 5,000 Units, 5,000 Units, subcutaneous, q8h KAMI, Stephen Trevizo MD, 5,000 Units at 03/20/24 1007    hydrALAZINE (Apresoline) injection 10 mg, 10 mg, intravenous, q6h PRN, Stephen Trevizo MD, 10 mg at 03/14/24 0017    insulin glargine (Lantus) injection 10 Units, 10 Units, subcutaneous, Nightly, Stephen Trevizo MD, 10 Units at 03/19/24 2114    insulin lispro (HumaLOG) injection 0-10 Units, 0-10 Units, subcutaneous, Before meals & nightly, Stephen Trevizo MD, 4 Units at 03/20/24 1206    levothyroxine (Synthroid, Levoxyl) tablet 75 mcg, 75 mcg, oral, Daily before breakfast, Stephen Trevizo MD, 75 mcg at 03/20/24 1007    melatonin tablet 5 mg, 5 mg, oral, Nightly, Stephen Trevizo MD, 5 mg at 03/19/24 2113    memantine (Namenda) tablet 10 mg, 10 mg, oral, BID, Stephen Trevizo MD, 10 mg at 03/20/24 1007    polyethylene glycol (Glycolax, Miralax) packet 17 g, 17 g, oral, Daily, Stephen Trevizo MD, 17 g at 03/19/24 0927    pramipexole (Mirapex) tablet 0.125 mg, 0.125 mg, oral, Daily, Stephen Trevizo MD, 0.125 mg at 03/20/24 1006    pravastatin (Pravachol) tablet 10 mg, 10 mg, oral, Nightly, Stephen Trevizo MD,  10 mg at 03/19/24 2112    scopolamine (Transderm-Scop) patch 1 patch, 1 patch, transdermal, q72h PRN, Stephen Trevizo MD, 1 patch at 03/18/24 2104    traZODone (Desyrel) tablet 25 mg, 25 mg, oral, Nightly PRN, Christopher Lerner MD, 25 mg at 03/19/24 7909    PERTINENT ROS:  Unable to gather.  Physical Exam:  Vital signs in last 24 hours:  Temp:  [36.1 °C (97 °F)-36.7 °C (98.1 °F)] 36.1 °C (97 °F)  Heart Rate:  [] 112  Resp:  [16-18] 16  BP: ()/(50-92) 138/88    General: Has been lethargic.  HEENT:  NCAT,  mucous membranes moist and pink  NECK: No JVD, no carotid bruit, supple, no cervical mass or thyromegaly  LUNGS;  Diminished breath sounds, no Rales  CV:  Distant, regular rate and rhythm, no murmurs  ABDOMEN:  abdomen soft, nontender, BS normal, no masses or organomegaly  EDEMA: No lower extremity edema/dependent edema  SKIN:  dry and normal turgor, no clubbing, cyanosis or petechia.  No rashes noted    Intake/Output last 3 shifts:  I/O last 3 completed shifts:  In: 480 (6.3 mL/kg) [P.O.:480]  Out: 2200 (29 mL/kg) [Urine:2200 (0.8 mL/kg/hr)]  Weight: 75.8 kg     DATA:  Diagnotic tests reviewed for Todays visit:  Results from last 7 days   Lab Units 03/20/24  0550   WBC AUTO x10*3/uL 13.4*   RBC AUTO x10*6/uL 3.83*   HEMOGLOBIN g/dL 11.5*   HEMATOCRIT % 38.0*     Results from last 7 days   Lab Units 03/20/24  0550 03/17/24  0533 03/16/24  0433   SODIUM mmol/L 140   < > 144   POTASSIUM mmol/L 4.3   < > 4.0   CHLORIDE mmol/L 107   < > 110*   CO2 mmol/L 22   < > 23   BUN mg/dL 40*   < > 59*   CREATININE mg/dL 2.51*   < > 4.93*   CALCIUM mg/dL 9.0   < > 8.9   PHOSPHORUS mg/dL  --   --  3.9   MAGNESIUM mg/dL 1.85   < > 1.85    < > = values in this interval not displayed.             Us Kidney   INDINGS:  RIGHT KIDNEY:  The right kidney measures 12.8 cm  in length. The renal cortical  echogenicity and thickness are within normal limits. Hydronephrosis  is identified.. Degree is moderate      LEFT KIDNEY:  The left  kidney measures 10.7 in length. The renal cortical  echogenicity and thickness are within normal limits. No  hydronephrosis is present; no evidence of nephrolithiasis.      Decompressed urinary bladder.      IMPRESSION:  Significant right-sided hydronephrosis. This is of unknown  significance. Distal obstruction should be considered. Further  evaluation is advised with CT imaging.      CXR  FINDINGS:  Cardiomegaly with pacemaker unchanged.      No consolidation, effusion, edema, or pneumothorax. Remote right rib  fracture.      IMPRESSION:  No evidence of acute intrathoracic abnormality  IMAGING: CXR reviewed in  images      SIGNATURE: Nader Dunbar MD  Nephrology and Hypertension      This note was partially generated using the Dragon voice recognition system, and there may be some incorrect words, spelling's and punctuation that were not noted in checking the note before saving.

## 2024-03-20 NOTE — NURSING NOTE
SHIFT NOTE    2300 Trazodone was given after melatonin because pt was agitated and pulling at figueroa.     0400 Haldol given for patient agitation and kicking at nurse.  Pt repeatedly called out and tried getting out of bed. Several RN's tried reorienting patient, but pt was still confused and asking the same questions. Pt was moved to the nurses' station for closer monitoring.     Haldol was effective. Pt fell asleep and was brought back to his room. EKG done for Qtc monitoring since he is not on tele. EKG showed V-paced.

## 2024-03-20 NOTE — PROGRESS NOTES
Physical Therapy                 Therapy Communication Note    Patient Name: Rogerio Miller  MRN: [id number]  Today's Date: 3/20/2024     Discipline: Physical Therapy    Missed Visit Reason: Missed Visit Reason: Patient sleeping (per nurse, patient is agitated and not appropriate at this time.  patient sleeping and she wished he be left alone for now and to try back later if appropriate.)    Missed Time: Attempt    Comment:

## 2024-03-21 VITALS
WEIGHT: 167.11 LBS | TEMPERATURE: 99.1 F | SYSTOLIC BLOOD PRESSURE: 97 MMHG | RESPIRATION RATE: 20 BRPM | OXYGEN SATURATION: 97 % | DIASTOLIC BLOOD PRESSURE: 57 MMHG | BODY MASS INDEX: 23.92 KG/M2 | HEIGHT: 70 IN | HEART RATE: 75 BPM

## 2024-03-21 LAB
ANION GAP SERPL CALC-SCNC: 13 MMOL/L (ref 10–20)
BASOPHILS # BLD AUTO: 0.1 X10*3/UL (ref 0–0.1)
BASOPHILS NFR BLD AUTO: 1 %
BUN SERPL-MCNC: 39 MG/DL (ref 6–23)
CALCIUM SERPL-MCNC: 9 MG/DL (ref 8.6–10.3)
CHLORIDE SERPL-SCNC: 110 MMOL/L (ref 98–107)
CO2 SERPL-SCNC: 24 MMOL/L (ref 21–32)
CREAT SERPL-MCNC: 2.6 MG/DL (ref 0.5–1.3)
EGFRCR SERPLBLD CKD-EPI 2021: 23 ML/MIN/1.73M*2
EOSINOPHIL # BLD AUTO: 0.63 X10*3/UL (ref 0–0.4)
EOSINOPHIL NFR BLD AUTO: 6.6 %
ERYTHROCYTE [DISTWIDTH] IN BLOOD BY AUTOMATED COUNT: 13.1 % (ref 11.5–14.5)
GLUCOSE BLD MANUAL STRIP-MCNC: 118 MG/DL (ref 74–99)
GLUCOSE BLD MANUAL STRIP-MCNC: 124 MG/DL (ref 74–99)
GLUCOSE SERPL-MCNC: 118 MG/DL (ref 74–99)
HCT VFR BLD AUTO: 35.8 % (ref 41–52)
HGB BLD-MCNC: 11 G/DL (ref 13.5–17.5)
IMM GRANULOCYTES # BLD AUTO: 0.07 X10*3/UL (ref 0–0.5)
IMM GRANULOCYTES NFR BLD AUTO: 0.7 % (ref 0–0.9)
LYMPHOCYTES # BLD AUTO: 1.23 X10*3/UL (ref 0.8–3)
LYMPHOCYTES NFR BLD AUTO: 12.8 %
MAGNESIUM SERPL-MCNC: 1.85 MG/DL (ref 1.6–2.4)
MCH RBC QN AUTO: 30.1 PG (ref 26–34)
MCHC RBC AUTO-ENTMCNC: 30.7 G/DL (ref 32–36)
MCV RBC AUTO: 98 FL (ref 80–100)
MONOCYTES # BLD AUTO: 0.58 X10*3/UL (ref 0.05–0.8)
MONOCYTES NFR BLD AUTO: 6 %
NEUTROPHILS # BLD AUTO: 7 X10*3/UL (ref 1.6–5.5)
NEUTROPHILS NFR BLD AUTO: 72.9 %
NRBC BLD-RTO: 0 /100 WBCS (ref 0–0)
PLATELET # BLD AUTO: 173 X10*3/UL (ref 150–450)
POTASSIUM SERPL-SCNC: 4.2 MMOL/L (ref 3.5–5.3)
RBC # BLD AUTO: 3.65 X10*6/UL (ref 4.5–5.9)
SODIUM SERPL-SCNC: 143 MMOL/L (ref 136–145)
WBC # BLD AUTO: 9.6 X10*3/UL (ref 4.4–11.3)

## 2024-03-21 PROCEDURE — 83735 ASSAY OF MAGNESIUM: CPT | Performed by: STUDENT IN AN ORGANIZED HEALTH CARE EDUCATION/TRAINING PROGRAM

## 2024-03-21 PROCEDURE — 36415 COLL VENOUS BLD VENIPUNCTURE: CPT | Performed by: STUDENT IN AN ORGANIZED HEALTH CARE EDUCATION/TRAINING PROGRAM

## 2024-03-21 PROCEDURE — 2500000004 HC RX 250 GENERAL PHARMACY W/ HCPCS (ALT 636 FOR OP/ED): Performed by: UROLOGY

## 2024-03-21 PROCEDURE — 99239 HOSP IP/OBS DSCHRG MGMT >30: CPT | Performed by: STUDENT IN AN ORGANIZED HEALTH CARE EDUCATION/TRAINING PROGRAM

## 2024-03-21 PROCEDURE — 82947 ASSAY GLUCOSE BLOOD QUANT: CPT

## 2024-03-21 PROCEDURE — 2500000001 HC RX 250 WO HCPCS SELF ADMINISTERED DRUGS (ALT 637 FOR MEDICARE OP): Performed by: UROLOGY

## 2024-03-21 PROCEDURE — 85025 COMPLETE CBC W/AUTO DIFF WBC: CPT | Performed by: STUDENT IN AN ORGANIZED HEALTH CARE EDUCATION/TRAINING PROGRAM

## 2024-03-21 PROCEDURE — 80048 BASIC METABOLIC PNL TOTAL CA: CPT | Performed by: STUDENT IN AN ORGANIZED HEALTH CARE EDUCATION/TRAINING PROGRAM

## 2024-03-21 RX ORDER — INSULIN GLARGINE 100 [IU]/ML
10 INJECTION, SOLUTION SUBCUTANEOUS NIGHTLY
Qty: 3 ML | Refills: 0 | Status: SHIPPED | OUTPATIENT
Start: 2024-03-21 | End: 2024-04-20

## 2024-03-21 RX ADMIN — MEMANTINE 10 MG: 10 TABLET ORAL at 08:57

## 2024-03-21 RX ADMIN — CITALOPRAM HYDROBROMIDE 10 MG: 10 TABLET ORAL at 08:57

## 2024-03-21 RX ADMIN — LEVOTHYROXINE SODIUM 75 MCG: 75 TABLET ORAL at 06:03

## 2024-03-21 RX ADMIN — PRAMIPEXOLE DIHYDROCHLORIDE 0.12 MG: 0.25 TABLET ORAL at 08:57

## 2024-03-21 RX ADMIN — HEPARIN SODIUM 5000 UNITS: 5000 INJECTION INTRAVENOUS; SUBCUTANEOUS at 08:57

## 2024-03-21 ASSESSMENT — COGNITIVE AND FUNCTIONAL STATUS - GENERAL
DRESSING REGULAR UPPER BODY CLOTHING: A LITTLE
HELP NEEDED FOR BATHING: A LITTLE
TOILETING: A LITTLE
MOVING FROM LYING ON BACK TO SITTING ON SIDE OF FLAT BED WITH BEDRAILS: A LITTLE
MOBILITY SCORE: 16
CLIMB 3 TO 5 STEPS WITH RAILING: A LOT
STANDING UP FROM CHAIR USING ARMS: A LITTLE
DRESSING REGULAR LOWER BODY CLOTHING: A LITTLE
WALKING IN HOSPITAL ROOM: A LOT
PERSONAL GROOMING: A LITTLE
DAILY ACTIVITIY SCORE: 18
MOVING TO AND FROM BED TO CHAIR: A LITTLE
EATING MEALS: A LITTLE
TURNING FROM BACK TO SIDE WHILE IN FLAT BAD: A LITTLE

## 2024-03-21 ASSESSMENT — PAIN SCALES - GENERAL: PAINLEVEL_OUTOF10: 0 - NO PAIN

## 2024-03-21 NOTE — PROGRESS NOTES
03/21/24 1216   Discharge Planning   Home or Post Acute Services Post acute facilities (Rehab/SNF/etc)   Type of Post Acute Facility Services Skilled nursing   Patient expects to be discharged to: [facility]   What day is the transport expected? 03/21/24   What time is the transport expected? 1500     Received  discharge orders. Asked the DSC to send the goldenrod, AVS and 7000. Transport was set for 3pm . Left a message for the pt's dtr.

## 2024-03-21 NOTE — DISCHARGE INSTRUCTIONS
You have been treated for UTI and completed your antibiotics during this hospitalization. In addition, you were treated for bladder stones and had ureteral stents placed. Please contact the urology clinic (Dr. Trevizo's office) to schedule follow up in 2 weeks. Contact renal clinic (Dr. Benoit or Dr. Dunbar) to schedule follow up to monitor your kidneys.    Obtain bloodwork in one week to monitor your kidney number.    Thank you for allowing the [facility] team to participate in your care!

## 2024-03-21 NOTE — CARE PLAN
The patient's goals for the shift include      The clinical goals for the shift include See plan of care 3/20      Problem: Safety - Adult  Goal: Free from fall injury  Outcome: Progressing     Problem: Discharge Planning  Goal: Discharge to home or other facility with appropriate resources  Outcome: Progressing     Problem: Chronic Conditions and Co-morbidities  Goal: Patient's chronic conditions and co-morbidity symptoms are monitored and maintained or improved  Outcome: Progressing     Problem: Skin  Goal: Decreased wound size/increased tissue granulation at next dressing change  Outcome: Progressing  Goal: Participates in plan/prevention/treatment measures  3/20/2024 2306 by Mary Coon RN  Outcome: Progressing  3/20/2024 2306 by Mary Coon RN  Flowsheets (Taken 3/20/2024 2306)  Participates in plan/prevention/treatment measures: Elevate heels  Goal: Prevent/manage excess moisture  3/20/2024 2306 by Mary Coon RN  Outcome: Progressing  3/20/2024 2306 by Mary Coon RN  Flowsheets (Taken 3/20/2024 2306)  Prevent/manage excess moisture: Cleanse incontinence/protect with barrier cream  Goal: Prevent/minimize sheer/friction injuries  3/20/2024 2306 by Mary Coon RN  Outcome: Progressing  3/20/2024 2306 by Mary Coon RN  Flowsheets (Taken 3/20/2024 2306)  Prevent/minimize sheer/friction injuries: Turn/reposition every 2 hours/use positioning/transfer devices     Problem: Fall/Injury  Goal: Not fall by end of shift  Outcome: Progressing  Goal: Be free from injury by end of the shift  Outcome: Progressing  Goal: Verbalize understanding of personal risk factors for fall in the hospital  Outcome: Progressing  Goal: Verbalize understanding of risk factor reduction measures to prevent injury from fall in the home  Outcome: Progressing  Goal: Use assistive devices by end of the shift  Outcome: Progressing  Goal: Pace activities to prevent fatigue by end of the shift  Outcome: Progressing     Problem:  Diabetes  Goal: Achieve decreasing blood glucose levels by end of shift  Outcome: Progressing  Goal: Maintain glucose levels >70mg/dl to <250mg/dl throughout shift  Outcome: Progressing     Problem: Acute Kidney Failure  Goal: Electrolytes/labs return to normal range  Outcome: Progressing  Goal: No signs of infection  Outcome: Progressing  Goal: No signs of neurosensory, musculoskeletal, cardiac changes  Outcome: Progressing  Goal: Stable weight and I&O  Outcome: Progressing  Goal: Tolerates renal replacement therapy  Outcome: Progressing  Goal: Wean vasopressor/achieve hemodynamic stability  Outcome: Progressing     Problem: Infective UTI/pyelonephritis  Goal: Follows fluid restrictions or becomes normovolemic  Outcome: Progressing  Goal: Manages/understands urgency, continence  Outcome: Progressing  Goal: No signs of neurosensory, musculoskeletal, cardiac changes  Outcome: Progressing  Goal: No worsening signs of infection  Outcome: Progressing  Goal: Stable weight and I&O  Outcome: Progressing     Problem: Obstructive: Kidney Stones, Hydronephrosis, BPH  Goal: Achieves normovolemia  Outcome: Progressing  Goal: Relieve obstruction  Outcome: Progressing  Goal: Return of normal voiding pattern  Outcome: Progressing  Goal: Skin/surrounding tissue free from infection/excoriation  Outcome: Progressing     Problem: Dressings Lower Extremities  Goal: STG -1   Outcome: Progressing     Problem: Dressing Upper Extremities  Goal: STG - 2  Outcome: Progressing     Problem: Toileting  Goal: STG - 5  Outcome: Progressing     Patient was stable over night without agitation. Patient slept well over night. Patient had slightly low BP and slightly elevated heart rate with 0400 vital signs, Dr. Lerner was made aware and no interventions were needed. Patient was asymptomatic. Patient vital signs were other wise stable. Patient safety maintained.

## 2024-03-21 NOTE — PROGRESS NOTES
INPATIENT NEPHROLOGY CONSULT PROGRESS NOTE      Patient Name: Rogerio Miller MRN: [id number]  DATE of SERVICE: March 21, 2024  TIME of SERVICE: 10:21 AM  CONSULTING SERVICE: Nephrology    REASON for CONSULT: WINSTON, right sided hydronephrosis     SUBJECTIVE:  Patient seen and evaluated at bedside.  Patient remains confused.  Renal function has been stable and nonoliguric.    SUMMARY OF STAY:  Mr. Miller is a 88 y.o. male who presents for eval of WINSTON detected on outpatient labs.   Found to have urinary retention requiring Yanez catheter placement.   Patient with past medical history significant for chronic kidney disease stage IIIa baseline serum creatinine 1.2 to 1.3 mg/L with EGFR of 50-55 mill per minute per 1.73 m² as of last year,  Longstanding history of diabetes mellitus, hypertension, hyperlipidemia, coronary artery disease, atrial fibrillation status post pacemaker for sick sinus syndrome, hypothyroidism.  Labs on presentation noted for acute kidney injury with creatinine up to 6.6 mg deciliter BUN of 76 and drop in GFR to 70 mill per minute per 1.73 m², hyperkalemia potassium 6.4 with mild hemolysis repeat potassium on an ABG elevated at 5.8. Metabolic acidosis with bicarb of 20 pH of 7.38 with component of respiratory alkalosis. Hemoglobin 12.4 mg/dL close to patient baseline.     ASSESSMENT:  -WINSTON on CKD IIIa: Multifactorial concern for hemodynamically mediated with bladder outlet obstruction, urinary retention requiring Yanez catheter and right-sided hydronephrosis  -Hyperkalemia, resolved.  -Metabolic acidosis, resolved  -Hypertension, optimally controlled.      PLAN:  -Renal function has been stabilized with creatinine around 2.6 mg/dL and BUN around 39.  Remains nonoliguric with urine output of 650 cc in the last 24 hours and still has some pinkish tinge to urine.  Yanez catheter in place.  -Patient has labs significant for creatinine of 2.0 mg/dL in 10/2023,  likely this could be new baseline.  -Will repeat renal ultrasound.  -Patient will likely have slow renal recovery.  -Hemoglobin has been stable around 11.  -Blood pressure has been fluctuating with episodes of hypotension with systolic in 80s currently blood pressure is around 114/70.  Has tachycardia.  Currently not on any antihypertensive medications.  Monitor blood pressure closely.  -Leukocytosis resolved.  --Status post bilateral double-J stents on 3/14 and plan for bladder stone removal in 3 to 4 weeks with urology.  -Okay to discharge from nephrology standpoint.  Follow-up with nephrology in 2 weeks.  Needs BMP in 1 week.  Discontinue metformin and Farxiga at the time of discharge.    Will follow, thank you!    Medications:    Current Facility-Administered Medications:     citalopram (CeleXA) tablet 10 mg, 10 mg, oral, Daily, Stephen Trevizo MD, 10 mg at 03/21/24 0857    dextrose 10 % in water (D10W) infusion, 0.3 g/kg/hr, intravenous, Once PRN, Stephen Trevizo MD    dextrose 50 % injection 25 g, 25 g, intravenous, q15 min PRN, Stephen Trevizo MD    diphenhydrAMINE (Sominex) tablet 25 mg, 25 mg, oral, Nightly PRN, Bonny Martinez MD    glucagon (Glucagen) injection 1 mg, 1 mg, intramuscular, q15 min PRN, Stephen Trevizo MD    heparin (porcine) injection 5,000 Units, 5,000 Units, subcutaneous, q8h KAMI, Stephen Trevizo MD, 5,000 Units at 03/21/24 0857    hydrALAZINE (Apresoline) injection 10 mg, 10 mg, intravenous, q6h PRN, Stephen Trevizo MD, 10 mg at 03/14/24 0017    insulin glargine (Lantus) injection 10 Units, 10 Units, subcutaneous, Nightly, Stephen Trevizo MD, 10 Units at 03/20/24 2116    insulin lispro (HumaLOG) injection 0-10 Units, 0-10 Units, subcutaneous, Before meals & nightly, Stephen Trevizo MD, 4 Units at 03/20/24 1206    levothyroxine (Synthroid, Levoxyl) tablet 75 mcg, 75 mcg, oral, Daily before breakfast, Stephen Trevizo MD, 75 mcg at 03/21/24 0603    melatonin tablet 5 mg, 5 mg, oral,  Nightly, Stephen Trevizo MD, 5 mg at 03/20/24 2116    memantine (Namenda) tablet 10 mg, 10 mg, oral, BID, Stephen Trevizo MD, 10 mg at 03/21/24 0857    polyethylene glycol (Glycolax, Miralax) packet 17 g, 17 g, oral, Daily, Stephen Trevizo MD, 17 g at 03/19/24 0927    pramipexole (Mirapex) tablet 0.125 mg, 0.125 mg, oral, Daily, Stephen Trevizo MD, 0.125 mg at 03/21/24 0857    pravastatin (Pravachol) tablet 10 mg, 10 mg, oral, Nightly, Stephen Trevizo MD, 10 mg at 03/20/24 2116    scopolamine (Transderm-Scop) patch 1 patch, 1 patch, transdermal, q72h PRN, Stephen Trevizo MD, 1 patch at 03/18/24 2104    PERTINENT ROS:  Unable to gather.  Physical Exam:  Vital signs in last 24 hours:  Temp:  [36 °C (96.8 °F)-36.7 °C (98.1 °F)] 36.2 °C (97.2 °F)  Heart Rate:  [] 110  Resp:  [16-20] 20  BP: ()/(63-88) 114/70    General: Alert, in no distress.  HEENT:  NCAT,  mucous membranes moist and pink  NECK: No JVD, no carotid bruit, supple, no cervical mass or thyromegaly  LUNGS;  Diminished breath sounds, no Rales  CV:  Distant, regular rate and rhythm, no murmurs  ABDOMEN:  abdomen soft, nontender, BS normal, no masses or organomegaly  EDEMA: No lower extremity edema/dependent edema  SKIN:  dry and normal turgor, no clubbing, cyanosis or petechia.  No rashes noted    Intake/Output last 3 shifts:  I/O last 3 completed shifts:  In: 120 (1.6 mL/kg) [P.O.:120]  Out: 1300 (17.2 mL/kg) [Urine:1300 (0.5 mL/kg/hr)]  Weight: 75.8 kg     DATA:  Diagnotic tests reviewed for Todays visit:  Results from last 7 days   Lab Units 03/21/24  0532   WBC AUTO x10*3/uL 9.6   RBC AUTO x10*6/uL 3.65*   HEMOGLOBIN g/dL 11.0*   HEMATOCRIT % 35.8*     Results from last 7 days   Lab Units 03/21/24  0532 03/17/24  0533 03/16/24  0433   SODIUM mmol/L 143   < > 144   POTASSIUM mmol/L 4.2   < > 4.0   CHLORIDE mmol/L 110*   < > 110*   CO2 mmol/L 24   < > 23   BUN mg/dL 39*   < > 59*   CREATININE mg/dL 2.60*   < > 4.93*   CALCIUM mg/dL 9.0   < > 8.9    PHOSPHORUS mg/dL  --   --  3.9   MAGNESIUM mg/dL 1.85   < > 1.85    < > = values in this interval not displayed.             Us Kidney   INDINGS:  RIGHT KIDNEY:  The right kidney measures 12.8 cm  in length. The renal cortical  echogenicity and thickness are within normal limits. Hydronephrosis  is identified.. Degree is moderate      LEFT KIDNEY:  The left kidney measures 10.7 in length. The renal cortical  echogenicity and thickness are within normal limits. No  hydronephrosis is present; no evidence of nephrolithiasis.      Decompressed urinary bladder.      IMPRESSION:  Significant right-sided hydronephrosis. This is of unknown  significance. Distal obstruction should be considered. Further  evaluation is advised with CT imaging.      CXR  FINDINGS:  Cardiomegaly with pacemaker unchanged.      No consolidation, effusion, edema, or pneumothorax. Remote right rib  fracture.      IMPRESSION:  No evidence of acute intrathoracic abnormality  IMAGING: CXR reviewed in  images      SIGNATURE: Nader Dunbar MD  Nephrology and Hypertension      This note was partially generated using the Dragon voice recognition system, and there may be some incorrect words, spelling's and punctuation that were not noted in checking the note before saving.

## 2024-03-21 NOTE — NURSING NOTE
Pt calm and cooperative through shift. Pt did not complain of pain. Pt rested through shift and repositioned every two hours. Pt ate his breakfast and will be discharged this shift.

## 2024-03-21 NOTE — DISCHARGE SUMMARY
Discharge Diagnosis  WINSTON (acute kidney injury) (CMS/[facility])    Issues Requiring Follow-Up  BMP in one week (3/28/24) to monitor renal function  PCP, Urology and Cardiology follow up    Discharge Meds     Your medication list        CHANGE how you take these medications        Instructions Last Dose Given Next Dose Due   insulin glargine 100 unit/mL injection  Commonly known as: Lantus  What changed:   how much to take  Another medication with the same name was removed. Continue taking this medication, and follow the directions you see here.      Inject 10 Units under the skin once daily at bedtime. Take as directed per insulin instructions.              CONTINUE taking these medications        Instructions Last Dose Given Next Dose Due   acetaminophen 325 mg tablet  Commonly known as: Tylenol           cholecalciferol 25 MCG (1000 UT) capsule  Commonly known as: Vitamin D-3           citalopram 10 mg tablet  Commonly known as: CeleXA           docusate sodium 100 mg capsule  Commonly known as: Colace           Fish OiL 1,000 mg (120 mg-180 mg) capsule  Generic drug: omega 3-dha-epa-fish oil           HumaLOG KwikPen Insulin 100 unit/mL injection  Generic drug: insulin lispro           levothyroxine 75 mcg tablet  Commonly known as: Synthroid, Levoxyl           melatonin 5 mg tablet           memantine 10 mg tablet  Commonly known as: Namenda           multivitamin tablet           pramipexole 0.125 mg tablet  Commonly known as: Mirapex           pravastatin 10 mg tablet  Commonly known as: Pravachol                  STOP taking these medications      dextromethorphan-guaifenesin  mg/5 mL oral liquid  Commonly known as: Robitussin DM                  Where to Get Your Medications        These medications were sent to [facility] - [place], [place] - [address] [place] [address]      Phone: [phone]   insulin glargine 100 unit/mL injection         Test Results Pending At Discharge  Pending  "Labs       No current pending labs.          Per admission HPI:  \"88 y.o. male presenting with abnormal labs, patient has been feeling weak and there is questionable report about his poor oral intake labs were obtained outpatient and his creatinine was 6.1 and potassium 6.1 came for further evaluation, patient reported generalized weakness he is alert oriented x 1 to himself only however he was alert oriented x 2 to the ED physician and the daughter earlier, daughter at the bedside, patient denies any complaints no nausea or vomiting no constipation or diarrhea, no fever no chills, no chest pain or shortness of breath, no recent change in his medications.     On arrival to the ED blood pressure was elevated 196/84, otherwise stable vital signs trending down without any intervention, labs showed potassium 6.4 but hemolyzed, creatinine 6.6, hemoglobin 12.2 at baseline, ABG was done and showed lactic acid of 0.7, potassium on the ABG was 5.8, nephrology consulted from the ED Yanez catheter was ordered not placed yet, started on sodium bicarb drip 100 cc/h, Lokelma 10 mg given in the ED and labs to be repeated later, admitted for further treatment for WINSTON and hyperkalemia. \"      Hospital Course  Patient admitted to medicine service on 3/11/2024.  Patient started on bicarb drip and received Lokelma.  Renal consulted and followed throughout admission.  Renal ultrasound obtained which showed significant right-sided hydronephrosis.  CT abdomen obtained which showed 0.4 cm radiopaque stone within the bladder in the dependent position near the left UVJ.  In addition also showed 0.9 cm cluster of radiopaque stones in distal left ureter. Urology consulted and pt taken to OR on 3/14 for b/l Ureteral stent placement.  Patient continued to have urinary retention throughout admission and required multiple straight caths.  Urology recommends to leave Yanez in place until outpatient urology follow-up.  Patient completed course of " "antibiotics (ceftriaxone/augmentin) for treatment of UTI.    Per urology: \"will need fu b/l urs/stone package and residual bladder stone removal in 3-4 weeks once edilberto/uti resolved \"    Patient's creatinine improved daily and 2.6 on day of discharge.  Renal recommends 1 week repeat BMP (ordered).     Patient had intermittent agitation overnight x 2 during admission which required Haldol and Geodon.  Avoid QT prolonging agent agents in the future as patient's repeat EKG showed QTc greater than 580.  Agitation has resolved.    Patient had transient mild leukocytosis to 13 K on 3/20 which resolved without intervention.  No evidence of new infection.  PT recommended SNF and family agreeable.  Patient discharged to SNF in stable condition.    > 30min in discharge coordination of care which includes: discharge planning, medication reconciliation, arranging appropriate follow up and discussion of discharge instructions with the patient.       Pertinent Physical Exam At Time of Discharge  Physical Exam  Constitutional: elderly, sleepy this AM but easily awakened, interactive, no distress, appears comfortable    ENMT: mucous membranes moist   Head/Neck: Neck supple   Respiratory/Thorax: Patent airways, CTAB, on RA, respirations even/nonlabored   Cardiovascular: Regular, rate and rhythm, no murmurs, normal S 1and S 2   Gastrointestinal: Nondistended, soft, nontender, no guarding nor rebound, figueroa with clear urine noted   Musculoskeletal: ROM intact, no joint swelling, normal strength   Extremities: normal extremities, no edema   Neurological: alert and oriented to self, remains mildly confused, intact senses,normal strength, follows commands, no facial droop     Outpatient Follow-Up  No future appointments.      Bonny Martinez MD  "

## 2024-03-24 ENCOUNTER — NURSING HOME VISIT (OUTPATIENT)
Dept: POST ACUTE CARE | Facility: EXTERNAL LOCATION | Age: 89
End: 2024-03-24
Payer: MEDICARE

## 2024-03-24 DIAGNOSIS — I12.9 HYPERTENSIVE KIDNEY DISEASE WITH STAGE 3A CHRONIC KIDNEY DISEASE (MULTI): ICD-10-CM

## 2024-03-24 DIAGNOSIS — N20.1 URETEROLITHIASIS: ICD-10-CM

## 2024-03-24 DIAGNOSIS — Z96.0 STATUS POST PLACEMENT OF URETERAL STENT: ICD-10-CM

## 2024-03-24 DIAGNOSIS — N17.9 AKI (ACUTE KIDNEY INJURY) (CMS-HCC): ICD-10-CM

## 2024-03-24 DIAGNOSIS — N18.4 CHRONIC RENAL DISEASE, STAGE IV (MULTI): ICD-10-CM

## 2024-03-24 DIAGNOSIS — N18.31 HYPERTENSIVE KIDNEY DISEASE WITH STAGE 3A CHRONIC KIDNEY DISEASE (MULTI): ICD-10-CM

## 2024-03-24 DIAGNOSIS — I11.0 HYPERTENSIVE HEART DISEASE WITH HEART FAILURE (MULTI): ICD-10-CM

## 2024-03-24 DIAGNOSIS — E11.51 TYPE 2 DIABETES MELLITUS WITH DIABETIC PERIPHERAL ANGIOPATHY WITHOUT GANGRENE, WITHOUT LONG-TERM CURRENT USE OF INSULIN (MULTI): ICD-10-CM

## 2024-03-24 DIAGNOSIS — I49.5 SICK SINUS SYNDROME (MULTI): ICD-10-CM

## 2024-03-24 DIAGNOSIS — N30.00 ACUTE CYSTITIS WITHOUT HEMATURIA: Primary | ICD-10-CM

## 2024-03-24 PROCEDURE — 99306 1ST NF CARE HIGH MDM 50: CPT | Performed by: FAMILY MEDICINE

## 2024-03-24 NOTE — LETTER
Patient: Rogerio Miller  : 1935    Encounter Date: 2024    Documentation at [facility]    Problem List Items Addressed This Visit       WINSTON (acute kidney injury) (CMS/HCC)    Chronic renal disease, stage IV (CMS/HCC)    Hypertensive heart disease with heart failure (CMS/HCC)    Sick sinus syndrome (CMS/HCC)    Type 2 diabetes mellitus with diabetic peripheral angiopathy without gangrene, without long-term current use of insulin (CMS/HCC)    Hypertensive kidney disease with stage 3a chronic kidney disease (CMS/HCC)     Other Visit Diagnoses       Acute cystitis without hematuria    -  Primary    Ureterolithiasis        Status post placement of ureteral stent                Electronically Signed By: Troy Valdez DO   3/28/24  8:38 PM

## 2024-03-28 PROBLEM — N18.4 CHRONIC RENAL DISEASE, STAGE IV (MULTI): Status: ACTIVE | Noted: 2024-03-28

## 2024-03-28 PROBLEM — I11.0 HYPERTENSIVE HEART DISEASE WITH HEART FAILURE (MULTI): Status: ACTIVE | Noted: 2024-03-28

## 2024-03-28 PROBLEM — I49.5 SICK SINUS SYNDROME (MULTI): Status: ACTIVE | Noted: 2024-03-28

## 2024-03-28 PROBLEM — N18.31 HYPERTENSIVE KIDNEY DISEASE WITH STAGE 3A CHRONIC KIDNEY DISEASE (MULTI): Status: ACTIVE | Noted: 2024-03-28

## 2024-03-28 PROBLEM — E11.51 TYPE 2 DIABETES MELLITUS WITH DIABETIC PERIPHERAL ANGIOPATHY WITHOUT GANGRENE, WITHOUT LONG-TERM CURRENT USE OF INSULIN (MULTI): Status: ACTIVE | Noted: 2024-03-28

## 2024-03-28 PROBLEM — I12.9 HYPERTENSIVE KIDNEY DISEASE WITH STAGE 3A CHRONIC KIDNEY DISEASE (MULTI): Status: ACTIVE | Noted: 2024-03-28

## 2024-03-29 LAB
ATRIAL RATE: 94 BPM
P AXIS: 76 DEGREES
Q ONSET: 186 MS
QRS COUNT: 12 BEATS
QRS DURATION: 180 MS
QT INTERVAL: 434 MS
QTC CALCULATION(BAZETT): 484 MS
QTC FREDERICIA: 467 MS
R AXIS: -74 DEGREES
T AXIS: 93 DEGREES
T OFFSET: 403 MS
VENTRICULAR RATE: 75 BPM

## 2024-03-29 NOTE — PROGRESS NOTES
Documentation at [facility]    Problem List Items Addressed This Visit       WINSTON (acute kidney injury) (CMS/HCC)    Chronic renal disease, stage IV (CMS/[facility])    Hypertensive heart disease with heart failure (CMS/[facility])    Sick sinus syndrome (CMS/[facility])    Type 2 diabetes mellitus with diabetic peripheral angiopathy without gangrene, without long-term current use of insulin (CMS/[facility])    Hypertensive kidney disease with stage 3a chronic kidney disease (CMS/[facility])     Other Visit Diagnoses       Acute cystitis without hematuria    -  Primary    Ureterolithiasis        Status post placement of ureteral stent

## 2024-03-31 LAB
ATRIAL RATE: 113 BPM
ATRIAL RATE: 56 BPM
Q ONSET: 182 MS
Q ONSET: 200 MS
QRS COUNT: 18 BEATS
QRS COUNT: 19 BEATS
QRS DURATION: 180 MS
QRS DURATION: 184 MS
QT INTERVAL: 414 MS
QT INTERVAL: 432 MS
QTC CALCULATION(BAZETT): 572 MS
QTC CALCULATION(BAZETT): 589 MS
QTC FREDERICIA: 514 MS
QTC FREDERICIA: 532 MS
R AXIS: -71 DEGREES
R AXIS: -73 DEGREES
T AXIS: 88 DEGREES
T AXIS: 89 DEGREES
T OFFSET: 398 MS
T OFFSET: 407 MS
VENTRICULAR RATE: 112 BPM
VENTRICULAR RATE: 115 BPM

## 2024-04-06 LAB
ATRIAL RATE: 70 BPM
ATRIAL RATE: 71 BPM
P AXIS: 89 DEGREES
P OFFSET: 104 MS
P OFFSET: 127 MS
P ONSET: 77 MS
P ONSET: 80 MS
PR INTERVAL: 256 MS
PR INTERVAL: 258 MS
Q ONSET: 206 MS
Q ONSET: 208 MS
QRS COUNT: 11 BEATS
QRS COUNT: 12 BEATS
QRS DURATION: 180 MS
QRS DURATION: 186 MS
QT INTERVAL: 462 MS
QT INTERVAL: 466 MS
QTC CALCULATION(BAZETT): 498 MS
QTC CALCULATION(BAZETT): 506 MS
QTC FREDERICIA: 486 MS
QTC FREDERICIA: 493 MS
R AXIS: -73 DEGREES
R AXIS: -74 DEGREES
T AXIS: 102 DEGREES
T AXIS: 97 DEGREES
T OFFSET: 439 MS
T OFFSET: 439 MS
VENTRICULAR RATE: 70 BPM
VENTRICULAR RATE: 71 BPM

## 2024-04-09 ENCOUNTER — TELEPHONE (OUTPATIENT)
Dept: PRIMARY CARE | Facility: CLINIC | Age: 89
End: 2024-04-09
Payer: MEDICARE

## 2024-04-09 NOTE — TELEPHONE ENCOUNTER
Pt is a current resident at [place] [address]. Pt's daughter is calling stating that after having a catheter in for a month they have removed it yesterday and he is urinating. She stated that her mother told her that he was urinating a lot yesterday and is very confused and seems out of it. She is asking if he can be tested for a UTI because this doesn't seem like him and thinks he could possibly have a UTI after having the catheter in? She is asking if you could let her know if this is possible her phone number is [phone]?

## 2024-04-12 ENCOUNTER — HOSPITAL ENCOUNTER (OUTPATIENT)
Facility: HOSPITAL | Age: 89
Setting detail: OUTPATIENT SURGERY
End: 2024-04-12
Attending: UROLOGY | Admitting: UROLOGY
Payer: MEDICARE

## 2024-04-12 ENCOUNTER — NURSING HOME VISIT (OUTPATIENT)
Dept: POST ACUTE CARE | Facility: EXTERNAL LOCATION | Age: 89
End: 2024-04-12
Payer: MEDICARE

## 2024-04-12 DIAGNOSIS — F03.918 DEMENTIA WITH BEHAVIORAL DISTURBANCE (MULTI): Primary | ICD-10-CM

## 2024-04-12 DIAGNOSIS — I11.0 HYPERTENSIVE HEART DISEASE WITH HEART FAILURE (MULTI): ICD-10-CM

## 2024-04-12 DIAGNOSIS — Z95.0 PRESENCE OF CARDIAC PACEMAKER: ICD-10-CM

## 2024-04-12 DIAGNOSIS — N13.39 OTHER HYDRONEPHROSIS: Primary | ICD-10-CM

## 2024-04-12 DIAGNOSIS — E11.51 TYPE 2 DIABETES MELLITUS WITH DIABETIC PERIPHERAL ANGIOPATHY WITHOUT GANGRENE, WITHOUT LONG-TERM CURRENT USE OF INSULIN (MULTI): ICD-10-CM

## 2024-04-12 DIAGNOSIS — G93.40 ENCEPHALOPATHY ACUTE: ICD-10-CM

## 2024-04-12 DIAGNOSIS — N18.4 CHRONIC RENAL DISEASE, STAGE IV (MULTI): ICD-10-CM

## 2024-04-12 DIAGNOSIS — Z96.0 STATUS POST PLACEMENT OF URETERAL STENT: ICD-10-CM

## 2024-04-12 DIAGNOSIS — G20.B2 PARKINSON'S DISEASE WITH DYSKINESIA AND FLUCTUATING MANIFESTATIONS (MULTI): ICD-10-CM

## 2024-04-12 DIAGNOSIS — N20.0 NEPHROLITHIASIS: ICD-10-CM

## 2024-04-12 DIAGNOSIS — N30.00 ACUTE CYSTITIS WITHOUT HEMATURIA: ICD-10-CM

## 2024-04-12 PROCEDURE — 99349 HOME/RES VST EST MOD MDM 40: CPT | Performed by: FAMILY MEDICINE

## 2024-04-12 NOTE — LETTER
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  Behaviors worsening.  Urine c/s positive.  ATB started    Dtr spoke with Urologist.  Planning to do surgery (lithotripsy?) later this month and ureteral stent removal.      Had Yanez cath for approx 30 days; successfully urinating without Yanez now      Electronically Signed By: Troy Valdez DO   24  7:34 AM

## 2024-04-14 PROBLEM — G93.40 ENCEPHALOPATHY ACUTE: Status: ACTIVE | Noted: 2024-04-14

## 2024-04-14 PROBLEM — Z96.0 STATUS POST PLACEMENT OF URETERAL STENT: Status: ACTIVE | Noted: 2024-04-14

## 2024-04-14 PROBLEM — N30.00 ACUTE CYSTITIS WITHOUT HEMATURIA: Status: ACTIVE | Noted: 2024-04-14

## 2024-04-14 PROBLEM — N20.0 NEPHROLITHIASIS: Status: ACTIVE | Noted: 2024-04-14

## 2024-04-14 PROBLEM — G20.B2 PARKINSON'S DISEASE WITH DYSKINESIA AND FLUCTUATING MANIFESTATIONS (MULTI): Status: ACTIVE | Noted: 2024-04-14

## 2024-04-14 PROBLEM — F03.918 DEMENTIA WITH BEHAVIORAL DISTURBANCE (MULTI): Status: ACTIVE | Noted: 2024-04-14

## 2024-04-14 PROBLEM — Z95.0 PRESENCE OF CARDIAC PACEMAKER: Status: ACTIVE | Noted: 2024-04-14

## 2024-04-14 NOTE — PROGRESS NOTES
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  Behaviors worsening.  Urine c/s positive.  ATB started    Dtr spoke with Urologist.  Planning to do surgery (lithotripsy?) later this month and ureteral stent removal.      Had Yanez cath for approx 30 days; successfully urinating without Yanez now

## 2024-04-22 ENCOUNTER — ANESTHESIA EVENT (OUTPATIENT)
Dept: OPERATING ROOM | Facility: HOSPITAL | Age: 89
End: 2024-04-22

## 2024-04-23 ENCOUNTER — ANESTHESIA (OUTPATIENT)
Dept: OPERATING ROOM | Facility: HOSPITAL | Age: 89
End: 2024-04-23

## (undated) DEVICE — GUIDEWIRE, ANGLE TIP,  .035 DIA, 180 CM, 3 CM TIP"

## (undated) DEVICE — GLOVE, SURGICAL, PROTEXIS PI W/NEU-THERA, 7.5, PF, LF

## (undated) DEVICE — Device

## (undated) DEVICE — TOWEL PACK, STERILE, 4/PACK, BLUE

## (undated) DEVICE — SYRINGE, LUER LOCK, 12ML

## (undated) DEVICE — NEEDLE, HYPODERMIC, SPECIALTY, REGULAR WALL, SHORT BEVEL, 18 G X 1.5 IN

## (undated) DEVICE — SOLUTION, IRRIGATION, STERILE WATER, 1000 ML, POUR BOTTLE

## (undated) DEVICE — 0.035IN X 15CM SENSOR NITINOL WIRE, W/HYDROPHILIC TIP

## (undated) DEVICE — GLOVE, SURGICAL, PROTEXIS PI ORTHO, 7.0, PF, LF

## (undated) DEVICE — DRAPE PACK, LAVH, W/ATTACHED LEGGINGS, W/POUCH, 100 X 114 IN, LF, STERILE

## (undated) DEVICE — SOLUTION, IRRIGATION, SODIUM CHLORIDE 0.9%, 1000 ML, POUR BOTTLE

## (undated) DEVICE — CATHETER, URETERAL, POLLACK, OPEN END, 5.5 FR, 70 CM